# Patient Record
Sex: FEMALE | Race: OTHER | HISPANIC OR LATINO | Employment: FULL TIME | URBAN - METROPOLITAN AREA
[De-identification: names, ages, dates, MRNs, and addresses within clinical notes are randomized per-mention and may not be internally consistent; named-entity substitution may affect disease eponyms.]

---

## 2017-02-22 ENCOUNTER — OFFICE VISIT (OUTPATIENT)
Dept: URGENT CARE | Facility: PHYSICIAN GROUP | Age: 42
End: 2017-02-22
Payer: COMMERCIAL

## 2017-02-22 VITALS
DIASTOLIC BLOOD PRESSURE: 88 MMHG | OXYGEN SATURATION: 97 % | HEART RATE: 100 BPM | BODY MASS INDEX: 28.14 KG/M2 | WEIGHT: 158.8 LBS | HEIGHT: 63 IN | TEMPERATURE: 98.1 F | SYSTOLIC BLOOD PRESSURE: 134 MMHG

## 2017-02-22 DIAGNOSIS — J06.9 VIRAL URI: ICD-10-CM

## 2017-02-22 LAB
INT CON NEG: NEGATIVE
INT CON POS: POSITIVE
S PYO AG THROAT QL: NORMAL

## 2017-02-22 PROCEDURE — 87880 STREP A ASSAY W/OPTIC: CPT | Performed by: FAMILY MEDICINE

## 2017-02-22 PROCEDURE — 99214 OFFICE O/P EST MOD 30 MIN: CPT | Performed by: FAMILY MEDICINE

## 2017-02-22 RX ORDER — BENZONATATE 100 MG/1
100 CAPSULE ORAL 3 TIMES DAILY PRN
Qty: 60 CAP | Refills: 0 | Status: SHIPPED | OUTPATIENT
Start: 2017-02-22 | End: 2019-01-25

## 2017-02-22 NOTE — MR AVS SNAPSHOT
"        Sybil Manuel   2017 6:00 PM   Office Visit   MRN: [id number]    Department:  [facility]   Dept Phone:  [phone]    Description:  Female : 1975   Provider:  Lex Magana M.D.           Reason for Visit     Sore Throat sore throat x4 days, cough      Allergies as of 2017     No Known Allergies      You were diagnosed with     Viral URI   [[id number]]         Vital Signs     Blood Pressure Pulse Temperature Height Weight Body Mass Index    134/88 mmHg 100 36.7 °C (98.1 °F) 1.6 m (5' 2.99\") 72.031 kg (158 lb 12.8 oz) 28.14 kg/m2    Oxygen Saturation Smoking Status                97% Never Smoker           Basic Information     Date Of Birth Sex Race Ethnicity Preferred Language    1975 Female  or  Unknown English      Problem List              ICD-10-CM Priority Class Noted - Resolved    Hyperglycemia R73.9   2010 - Present    Elevated SGOT R74.0   9/3/2010 - Present    Vitamin d deficiency    9/3/2010 - Present    Acne L70.9   2011 - Present    Seasonal allergies J30.2   7/15/2016 - Present      Health Maintenance        Date Due Completion Dates    IMM DTaP/Tdap/Td Vaccine (1 - Tdap) 1993 ---    IMM INFLUENZA (1) 2016 ---    MAMMOGRAM 2017, 7/10/2015, 2015    PAP SMEAR 2019            Current Immunizations     No immunizations on file.      Below and/or attached are the medications your provider expects you to take. Review all of your home medications and newly ordered medications with your provider and/or pharmacist. Follow medication instructions as directed by your provider and/or pharmacist. Please keep your medication list with you and share with your provider. Update the information when medications are discontinued, doses are changed, or new medications (including over-the-counter products) are added; and carry medication information at all times in the event of emergency situations     Allergies:  " No Known Allergies          Medications  Valid as of: February 22, 2017 -  7:13 PM    Generic Name Brand Name Tablet Size Instructions for use    Acyclovir (Tab) ZOVIRAX 800 MG Take 800 mg by mouth 2 times a day. For mouth lesions.   Indications: Herpes Simplex Infection        Benzonatate (Cap) TESSALON 100 MG Take 1 Cap by mouth 3 times a day as needed for Cough.        Lancets (Misc) Lancets  1 Units by Does not apply route 4 times a day.        Loratadine   Take 10 mg by mouth 1 time daily as needed.        Mometasone Furoate (Suspension) NASONEX 50 MCG/ACT Spray 2 Act in nose every day. Each nostril        Multiple Vitamins-Minerals   Take  by mouth.        Mupirocin (Ointment) BACTROBAN 2 % Apply  to affected area(s) 3 times a day as needed. Apply thin film        .                 Medicines prescribed today were sent to:     [facility] [address] [place] [id number]    Phone: [phone] Fax: [phone]    Open 24 Hours?: No      Medication refill instructions:       If your prescription bottle indicates you have medication refills left, it is not necessary to call your provider’s office. Please contact your pharmacy and they will refill your medication.    If your prescription bottle indicates you do not have any refills left, you may request refills at any time through one of the following ways: The online [employer] system (except Urgent Care), by calling your provider’s office, or by asking your pharmacy to contact your provider’s office with a refill request. Medication refills are processed only during regular business hours and may not be available until the next business day. Your provider may request additional information or to have a follow-up visit with you prior to refilling your medication.   *Please Note: Medication refills are assigned a new Rx number when refilled electronically. Your pharmacy may indicate that no refills were  authorized even though a new prescription for the same medication is available at the pharmacy. Please request the medicine by name with the pharmacy before contacting your provider for a refill.           BOOK A TIGER Access Code: [email or web address]  Expires: 3/24/2017  5:48 PM    BOOK A TIGER  A secure, online tool to manage your health information     [employer]’s BOOK A TIGER® is a secure, online tool that connects you to your personalized health information from the privacy of your home -- day or night - making it very easy for you to manage your healthcare. Once the activation process is completed, you can even access your medical information using the BOOK A TIGER windy, which is available for free in the Apple Windy store or Google Play store.     BOOK A TIGER provides the following levels of access (as shown below):   My Chart Features   [facility] Doctor [facility]  Urgent  Care Non-[facility]  Primary Care  Doctor   Email your healthcare team securely and privately 24/7 X X X    Manage appointments: schedule your next appointment; view details of past/upcoming appointments X      Request prescription refills. X      View recent personal medical records, including lab and immunizations X X X X   View health record, including health history, allergies, medications X X X X   Read reports about your outpatient visits, procedures, consult and ER notes X X X X   See your discharge summary, which is a recap of your hospital and/or ER visit that includes your diagnosis, lab results, and care plan. X X       How to register for BOOK A TIGER:  1. Go to  [email or web address].  2. Click on the Sign Up Now box, which takes you to the New Member Sign Up page. You will need to provide the following information:  a. Enter your BOOK A TIGER Access Code exactly as it appears at the top of this page. (You will not need to use this code after you’ve completed the sign-up process. If you do not sign up before the expiration date, you  must request a new code.)   b. Enter your date of birth.   c. Enter your home email address.   d. Click Submit, and follow the next screen’s instructions.  3. Create a [employer] ID. This will be your [employer] login ID and cannot be changed, so think of one that is secure and easy to remember.  4. Create a [employer] password. You can change your password at any time.  5. Enter your Password Reset Question and Answer. This can be used at a later time if you forget your password.   6. Enter your e-mail address. This allows you to receive e-mail notifications when new information is available in [employer].  7. Click Sign Up. You can now view your health information.    For assistance activating your [employer] account, call [phone]

## 2017-02-23 NOTE — PROGRESS NOTES
"Subjective:     CC:  presents with Pharyngitis            Pharyngitis   This is a new problem. The current episode started in the past 2 days. The problem has been unchanged. There has been no fever. The pain is moderate. Associated symptoms include a hoarse voice, cough. Pertinent negatives include no abdominal pain,  , diarrhea, headaches, shortness of breath or vomiting. no exposure to strep or mono.   has tried acetaminophen for the symptoms. The treatment provided mild relief.     Social History   Substance Use Topics   • Smoking status: Never Smoker    • Smokeless tobacco: Never Used   • Alcohol Use: 0.0 oz/week     0 Standard drinks or equivalent per week      Comment: 4 per year       Past Medical History   Diagnosis Date   • Depression 2008     no antidepressant   • Anxiety      Lorazepam rarely   • Allergy    • GERD (gastroesophageal reflux disease)      occasional   • Hyperlipidemia    • Vitamin D deficiency 2009   • Elevated liver enzymes 2009   • Urinary tract infection, site not specified    • Diabetes mellitus type II 9/3/2010   • ASTHMA 9/3/2010       Review of Systems   Constitutional: Positive for malaise/fatigue. Negative for fever and weight loss.   HENT: Positive for hoarse voice and trouble swallowing. Negative for congestion.    Respiratory: Negative for cough, sputum production and shortness of breath.    Cardiovascular: Negative for chest pain.   Gastrointestinal: Negative for nausea, vomiting, abdominal pain and diarrhea.   Genitourinary: Negative.    Neurological: Negative for dizziness and headaches.   All other systems reviewed and are negative.         Objective:   Blood pressure 134/88, pulse 100, temperature 36.7 °C (98.1 °F), height 1.6 m (5' 2.99\"), weight 72.031 kg (158 lb 12.8 oz), SpO2 97 %.        Physical Exam   Constitutional:   oriented to person, place, and time.  appears well-developed and well-nourished. No distress.   HENT:   Head: Normocephalic and atraumatic.   Right " Ear: External ear normal.   Left Ear: External ear normal.   Nose: Mucosal edema present. Right sinus exhibits no maxillary sinus tenderness and no frontal sinus tenderness. Left sinus exhibits no maxillary sinus tenderness and no frontal sinus tenderness.   Mouth/Throat: no posterior oropharyngeal exudate.   There is posterior oropharyngeal erythema present. No posterior oropharyngeal edema.   Tonsils 2+ bilaterally     Eyes: Conjunctivae and EOM are normal. Pupils are equal, round, and reactive to light. Right eye exhibits no discharge. Left eye exhibits no discharge. No scleral icterus.   Neck: Normal range of motion. Neck supple. No JVD present. No tracheal deviation present. No thyromegaly present.   Cardiovascular: Normal rate, regular rhythm, normal heart sounds and intact distal pulses.  Exam reveals no friction rub.    No murmur heard.  Pulmonary/Chest: Effort normal and breath sounds normal. No respiratory distress.   no wheezes.   no rales.    Musculoskeletal:  exhibits no edema.   Lymphadenopathy:     NO Cervical adenopathy.   Neurological:   alert and oriented to person, place, and time.   Skin: Skin is warm and dry. No erythema.   Psychiatric:   normal mood and affect.   Nursing note and vitals reviewed.             Assessment/Plan:     1. Viral URI  Rapid strep negative  - benzonatate (TESSALON) 100 MG Cap; Take 1 Cap by mouth 3 times a day as needed for Cough.  Dispense: 60 Cap; Refill: 0  - POCT Rapid Strep A

## 2017-02-24 ENCOUNTER — TELEPHONE (OUTPATIENT)
Dept: MEDICAL GROUP | Facility: MEDICAL CENTER | Age: 42
End: 2017-02-24

## 2017-02-24 ENCOUNTER — OFFICE VISIT (OUTPATIENT)
Dept: MEDICAL GROUP | Facility: CLINIC | Age: 42
End: 2017-02-24
Payer: COMMERCIAL

## 2017-02-24 VITALS
SYSTOLIC BLOOD PRESSURE: 128 MMHG | DIASTOLIC BLOOD PRESSURE: 76 MMHG | HEIGHT: 63 IN | RESPIRATION RATE: 20 BRPM | WEIGHT: 157.1 LBS | HEART RATE: 96 BPM | TEMPERATURE: 97.9 F | OXYGEN SATURATION: 96 % | BODY MASS INDEX: 27.84 KG/M2

## 2017-02-24 DIAGNOSIS — J40 BRONCHITIS: ICD-10-CM

## 2017-02-24 PROCEDURE — 99213 OFFICE O/P EST LOW 20 MIN: CPT | Performed by: NURSE PRACTITIONER

## 2017-02-24 RX ORDER — AZITHROMYCIN 250 MG/1
TABLET, FILM COATED ORAL
Qty: 6 TAB | Refills: 0 | Status: SHIPPED | OUTPATIENT
Start: 2017-02-24 | End: 2019-01-25

## 2017-02-24 ASSESSMENT — ENCOUNTER SYMPTOMS
SORE THROAT: 1
MYALGIAS: 1
CHILLS: 1
HEADACHES: 0
ABDOMINAL PAIN: 0
SHORTNESS OF BREATH: 1
SPUTUM PRODUCTION: 1
COUGH: 1
NAUSEA: 0
FEVER: 1
VOMITING: 0
WHEEZING: 0

## 2017-02-24 NOTE — TELEPHONE ENCOUNTER
PT called states she went to urgent care wed, states they didn't give her any antibiotics   Pt states she is having chest and sinus congestion, pain in her chest when coughing, productive green mucus cough, pt states she feels like she is going to vomit when she coughs would like to know if there is anything you could give her to help?

## 2017-02-24 NOTE — TELEPHONE ENCOUNTER
Please inform patient:  Urgent care note reviewed, from the documentation it sounds like this is about 4 days of illness. This means that it is most likely viral and an antibiotic will not help. She should take Mucinex, drink lots of water, rest. I can give her prescription cough medicine if needed- let me know if she would like this. Otherwise this should resolve in 7-10 days

## 2017-02-24 NOTE — TELEPHONE ENCOUNTER
Pt states she usually gets a z-pack when she gets like this and gets better would like to know if she could get that rx

## 2017-02-24 NOTE — TELEPHONE ENCOUNTER
Not necessary at this time. I am happy to give her cough syrup if she would like. If she is not improving by next week we can get her in for a visit to determine if she does need an antibiotic

## 2017-02-24 NOTE — TELEPHONE ENCOUNTER
Patient can  Prescription for cough medicine from the . She should not drive or drink alcohol when using this medication

## 2017-02-25 NOTE — PROGRESS NOTES
Chief Complaint   Patient presents with   • URI     coughing/chest congestion/ bodyaches/chills X 6 days        HISTORY OF PRESENT ILLNESS: Patient is a 41 y.o. female established patient who presents today due to 6 days hx of productive coughing, nasal and chest congestion, body aches, right ear plug, sore throat. Pt reported she went to urgent care two days ago, prescribed with tessalon but it did not help at all. She called her doctor and was prescribed with coughing syrup with codeine but she did not get the prescription. She is now taking OTC joellen-seltzer for her symptoms but has not seen the difference yet.      Patient Active Problem List    Diagnosis Date Noted   • Seasonal allergies 07/15/2016   • Acne 07/21/2011   • Elevated SGOT 09/03/2010   • Vitamin d deficiency 09/03/2010   • Hyperglycemia 07/22/2010       Allergies:Review of patient's allergies indicates no known allergies.    Current Outpatient Prescriptions   Medication Sig Dispense Refill   • benzonatate (TESSALON) 100 MG Cap Take 1 Cap by mouth 3 times a day as needed for Cough. 60 Cap 0   • Lancets MISC 1 Units by Does not apply route 4 times a day. 120 Each 6   • Guaifenesin-Codeine 200-20 MG/5ML Liquid Take 5 mL by mouth every 6 hours as needed. 100 mL 0   • acyclovir (ZOVIRAX) 800 MG TABS Take 800 mg by mouth 2 times a day. For mouth lesions.   Indications: Herpes Simplex Infection     • mometasone (NASONEX) 50 MCG/ACT nasal spray Spray 2 Act in nose every day. Each nostril 1 Act 11   • mupirocin (BACTROBAN) 2 % OINT Apply  to affected area(s) 3 times a day as needed. Apply thin film 1 Tube 0   • LORATADINE Take 10 mg by mouth 1 time daily as needed.     • Multiple Vitamins-Minerals (MULTIVITAL PO) Take  by mouth.       No current facility-administered medications for this visit.       Social History   Substance Use Topics   • Smoking status: Never Smoker    • Smokeless tobacco: Never Used   • Alcohol Use: 0.0 oz/week     0 Standard drinks or  "equivalent per week      Comment: 4 per year       Family Status   Relation Status Death Age   • Mother Alive    • Maternal Grandmother     • Father Alive    • Daughter Alive    • Daughter Alive    • Son Alive    • Sister Alive    • Brother Alive      Family History   Problem Relation Age of Onset   • Heart Disease Mother      67 yo stents, pacemaker x2   • Hypertension Mother    • Hyperlipidemia Mother    • Arrythmia Mother    • Diabetes Maternal Grandmother    • Hyperlipidemia Brother          ROS:  Review of Systems   Constitutional: Positive for fever ( she felt chills and then sweating last night. no temp checked but might have fever at that time), chills and malaise/fatigue.   HENT: Positive for congestion, ear pain ( right ear) and sore throat.    Respiratory: Positive for cough, sputum production and shortness of breath. Negative for wheezing.    Gastrointestinal: Negative for nausea, vomiting and abdominal pain.   Musculoskeletal: Positive for myalgias.   Neurological: Negative for headaches.        Objective:     Blood pressure 128/76, pulse 96, temperature 36.6 °C (97.9 °F), resp. rate 20, height 1.598 m (5' 2.9\"), weight 71.26 kg (157 lb 1.6 oz), SpO2 96 %.     Physical Exam:  Physical Exam   Constitutional: She is oriented to person, place, and time and well-developed, well-nourished, and in no distress.   HENT:   Head: Normocephalic and atraumatic.   Right Ear: There is tenderness. Tympanic membrane is injected ( very mild small area).   Left Ear: External ear normal.   Nose: Right sinus exhibits no maxillary sinus tenderness and no frontal sinus tenderness. Left sinus exhibits no maxillary sinus tenderness and no frontal sinus tenderness.   Mouth/Throat: Oropharynx is clear and moist.   Eyes: Conjunctivae are normal.   Cardiovascular: Normal rate.    Pulmonary/Chest: Effort normal. No respiratory distress. She has no wheezes. She has no rales.   Diminished at lower lobes   Abdominal: Soft. "   Musculoskeletal: Normal range of motion. She exhibits no edema.   Neurological: She is alert and oriented to person, place, and time.   Skin: Skin is warm.   Vitals reviewed.        Assessment/Plan:  1. Bronchitis  - Guaifenesin-Codeine 200-20 MG/5ML Liquid; Take 5 mL by mouth at bedtime as needed.  Dispense: 100 mL; Refill: 0  - azithromycin (ZITHROMAX) 250 MG Tab; As directed  Dispense: 6 Tab; Refill: 0 to also cover possible otitis media  - Call if worsening symptoms. Pt verbalized understanding.

## 2017-03-01 ENCOUNTER — HOSPITAL ENCOUNTER (OUTPATIENT)
Facility: MEDICAL CENTER | Age: 42
End: 2017-03-01
Attending: PHYSICIAN ASSISTANT
Payer: COMMERCIAL

## 2017-03-01 PROCEDURE — 86480 TB TEST CELL IMMUN MEASURE: CPT

## 2017-03-03 LAB
M TB TUBERC IFN-G/MITOGEN IGNF BLD: 0.21
M TB TUBERC IGNF/MITOGEN IGNF CONTROL: 83.91 [IU]/ML
MITOGEN IGNF BCKGRD COR BLD-ACNC: 0.12 [IU]/ML
QUANT TB GOLD 86480: NEGATIVE

## 2018-04-26 ENCOUNTER — HOSPITAL ENCOUNTER (OUTPATIENT)
Dept: LAB | Facility: MEDICAL CENTER | Age: 43
End: 2018-04-26
Attending: CLINICAL NURSE SPECIALIST
Payer: COMMERCIAL

## 2018-04-26 PROCEDURE — 87077 CULTURE AEROBIC IDENTIFY: CPT | Mod: 91

## 2018-04-26 PROCEDURE — 87205 SMEAR GRAM STAIN: CPT

## 2018-04-26 PROCEDURE — 87070 CULTURE OTHR SPECIMN AEROBIC: CPT

## 2018-04-26 PROCEDURE — 87086 URINE CULTURE/COLONY COUNT: CPT

## 2018-04-27 ENCOUNTER — HOSPITAL ENCOUNTER (OUTPATIENT)
Dept: LAB | Facility: MEDICAL CENTER | Age: 43
End: 2018-04-27
Attending: CLINICAL NURSE SPECIALIST
Payer: COMMERCIAL

## 2018-04-27 LAB
25(OH)D3 SERPL-MCNC: 21 NG/ML (ref 30–100)
ALBUMIN SERPL BCP-MCNC: 4.2 G/DL (ref 3.2–4.9)
ALBUMIN/GLOB SERPL: 1.1 G/DL
ALP SERPL-CCNC: 85 U/L (ref 30–99)
ALT SERPL-CCNC: 76 U/L (ref 2–50)
AMBIGUOUS DTTM AMBI4: NORMAL
ANION GAP SERPL CALC-SCNC: 9 MMOL/L (ref 0–11.9)
AST SERPL-CCNC: 52 U/L (ref 12–45)
BILIRUB SERPL-MCNC: 0.5 MG/DL (ref 0.1–1.5)
BUN SERPL-MCNC: 12 MG/DL (ref 8–22)
CALCIUM SERPL-MCNC: 9.5 MG/DL (ref 8.5–10.5)
CHLORIDE SERPL-SCNC: 100 MMOL/L (ref 96–112)
CHOLEST SERPL-MCNC: 206 MG/DL (ref 100–199)
CO2 SERPL-SCNC: 26 MMOL/L (ref 20–33)
CREAT SERPL-MCNC: 0.65 MG/DL (ref 0.5–1.4)
ERYTHROCYTE [DISTWIDTH] IN BLOOD BY AUTOMATED COUNT: 42.2 FL (ref 35.9–50)
EST. AVERAGE GLUCOSE BLD GHB EST-MCNC: 266 MG/DL
GLOBULIN SER CALC-MCNC: 3.8 G/DL (ref 1.9–3.5)
GLUCOSE SERPL-MCNC: 259 MG/DL (ref 65–99)
GRAM STN SPEC: NORMAL
HBA1C MFR BLD: 10.9 % (ref 0–5.6)
HCT VFR BLD AUTO: 44.9 % (ref 37–47)
HDLC SERPL-MCNC: 36 MG/DL
HGB BLD-MCNC: 14.3 G/DL (ref 12–16)
LDLC SERPL CALC-MCNC: 135 MG/DL
MCH RBC QN AUTO: 27.9 PG (ref 27–33)
MCHC RBC AUTO-ENTMCNC: 31.8 G/DL (ref 33.6–35)
MCV RBC AUTO: 87.7 FL (ref 81.4–97.8)
PLATELET # BLD AUTO: 329 K/UL (ref 164–446)
PMV BLD AUTO: 10.8 FL (ref 9–12.9)
POTASSIUM SERPL-SCNC: 4.5 MMOL/L (ref 3.6–5.5)
PROT SERPL-MCNC: 8 G/DL (ref 6–8.2)
RBC # BLD AUTO: 5.12 M/UL (ref 4.2–5.4)
SIGNIFICANT IND 70042: NORMAL
SIGNIFICANT IND 70042: NORMAL
SITE SITE: NORMAL
SITE SITE: NORMAL
SODIUM SERPL-SCNC: 135 MMOL/L (ref 135–145)
SOURCE SOURCE: NORMAL
SOURCE SOURCE: NORMAL
T4 FREE SERPL-MCNC: 0.88 NG/DL (ref 0.53–1.43)
TRIGL SERPL-MCNC: 174 MG/DL (ref 0–149)
TSH SERPL DL<=0.005 MIU/L-ACNC: 2.19 UIU/ML (ref 0.38–5.33)
WBC # BLD AUTO: 6.1 K/UL (ref 4.8–10.8)

## 2018-04-27 PROCEDURE — 80061 LIPID PANEL: CPT

## 2018-04-27 PROCEDURE — 83036 HEMOGLOBIN GLYCOSYLATED A1C: CPT

## 2018-04-27 PROCEDURE — 84439 ASSAY OF FREE THYROXINE: CPT

## 2018-04-27 PROCEDURE — 80053 COMPREHEN METABOLIC PANEL: CPT

## 2018-04-27 PROCEDURE — 84443 ASSAY THYROID STIM HORMONE: CPT

## 2018-04-27 PROCEDURE — 85027 COMPLETE CBC AUTOMATED: CPT

## 2018-04-27 PROCEDURE — 36415 COLL VENOUS BLD VENIPUNCTURE: CPT

## 2018-04-27 PROCEDURE — 82306 VITAMIN D 25 HYDROXY: CPT

## 2018-04-29 LAB
BACTERIA UR CULT: ABNORMAL
BACTERIA UR CULT: ABNORMAL
SIGNIFICANT IND 70042: ABNORMAL
SITE SITE: ABNORMAL
SOURCE SOURCE: ABNORMAL

## 2018-04-30 LAB
BACTERIA GENITAL AEROBE CULT: ABNORMAL
BACTERIA GENITAL AEROBE CULT: ABNORMAL
GRAM STN SPEC: ABNORMAL
SIGNIFICANT IND 70042: ABNORMAL
SITE SITE: ABNORMAL
SOURCE SOURCE: ABNORMAL

## 2018-05-11 ENCOUNTER — TELEPHONE (OUTPATIENT)
Dept: MEDICAL GROUP | Facility: MEDICAL CENTER | Age: 43
End: 2018-05-11

## 2018-05-11 NOTE — TELEPHONE ENCOUNTER
Patient called needing to reschedule today's appointment at 2:40 pm. Called patient and left voicemail to call back office

## 2018-07-13 ENCOUNTER — OFFICE VISIT (OUTPATIENT)
Dept: MEDICAL GROUP | Facility: MEDICAL CENTER | Age: 43
End: 2018-07-13
Payer: COMMERCIAL

## 2018-07-13 VITALS
RESPIRATION RATE: 16 BRPM | TEMPERATURE: 97.7 F | WEIGHT: 152 LBS | OXYGEN SATURATION: 97 % | HEART RATE: 104 BPM | DIASTOLIC BLOOD PRESSURE: 70 MMHG | HEIGHT: 63 IN | BODY MASS INDEX: 26.93 KG/M2 | SYSTOLIC BLOOD PRESSURE: 110 MMHG

## 2018-07-13 DIAGNOSIS — E11.9 NEWLY DIAGNOSED DIABETES (HCC): ICD-10-CM

## 2018-07-13 DIAGNOSIS — R74.8 ELEVATED LIVER ENZYMES: ICD-10-CM

## 2018-07-13 DIAGNOSIS — E55.9 VITAMIN D DEFICIENCY: ICD-10-CM

## 2018-07-13 DIAGNOSIS — E11.69 DYSLIPIDEMIA ASSOCIATED WITH TYPE 2 DIABETES MELLITUS (HCC): ICD-10-CM

## 2018-07-13 DIAGNOSIS — E78.5 DYSLIPIDEMIA ASSOCIATED WITH TYPE 2 DIABETES MELLITUS (HCC): ICD-10-CM

## 2018-07-13 PROCEDURE — 99214 OFFICE O/P EST MOD 30 MIN: CPT | Performed by: NURSE PRACTITIONER

## 2018-07-13 RX ORDER — METFORMIN HYDROCHLORIDE 500 MG/1
TABLET, EXTENDED RELEASE ORAL
Qty: 120 TAB | Refills: 1 | Status: SHIPPED | OUTPATIENT
Start: 2018-07-13 | End: 2018-12-21 | Stop reason: SDUPTHER

## 2018-07-13 ASSESSMENT — PATIENT HEALTH QUESTIONNAIRE - PHQ9: CLINICAL INTERPRETATION OF PHQ2 SCORE: 0

## 2018-07-13 NOTE — PROGRESS NOTES
Subjective:     Chief Complaint   Patient presents with   • Follow-Up     RESULTS FROM OB     Sybil Manuel is a 43 y.o. female here today to review labs recently you ordered by her OB/GYN. We discussed:    Newly diagnosed diabetes (HCC)  Labs recently ordered by her OB/GYN showing a1c 10.9  Brothers, grandmother uncles with diabetes  Feels tired but denies polyuria, polyphagia. No numbness or tingling in ext  Eating smaller portions and healthier options in the last few weeks since receiving lab results  Working out, hiking  No prior diabetes education  Recent kidney function normal    Vitamin D deficiency  21 in recent labs, not currently on supplement    Elevated liver enzymes  Component      Latest Ref Rng & Units 4/27/2018           7:37 AM   AST(SGOT)      12 - 45 U/L 52 (H)   ALT(SGPT)      2 - 50 U/L 76 (H)   Rare ETOH use      Dyslipidemia associated with type 2 diabetes mellitus (HCC)  Component      Latest Ref Rng & Units 4/27/2018           7:37 AM   Cholesterol,Tot      100 - 199 mg/dL 206 (H)   Triglycerides      0 - 149 mg/dL 174 (H)   HDL      >=40 mg/dL 36 (A)   LDL      <100 mg/dL 135 (H)        Current medicines (including changes today)  Current Outpatient Prescriptions   Medication Sig Dispense Refill   • metFORMIN ER (GLUCOPHAGE XR) 500 MG TABLET SR 24 HR Start 1 tab PO QAM with food. Gradually increase to goal of 2 tabs PO  Tab 1   • LORATADINE Take 10 mg by mouth 1 time daily as needed.     • Multiple Vitamins-Minerals (MULTIVITAL PO) Take  by mouth.     • Guaifenesin-Codeine 200-20 MG/5ML Liquid Take 5 mL by mouth at bedtime as needed. 100 mL 0   • azithromycin (ZITHROMAX) 250 MG Tab As directed 6 Tab 0   • benzonatate (TESSALON) 100 MG Cap Take 1 Cap by mouth 3 times a day as needed for Cough. 60 Cap 0   • mometasone (NASONEX) 50 MCG/ACT nasal spray Spray 2 Act in nose every day. Each nostril 1 Act 11   • Lancets MISC 1 Units by Does not apply route 4 times a day. 120 Each 6   •  "mupirocin (BACTROBAN) 2 % OINT Apply  to affected area(s) 3 times a day as needed. Apply thin film 1 Tube 0     No current facility-administered medications for this visit.      She  has a past medical history of Allergy; Anxiety; ASTHMA (9/3/2010); Depression (2008); Diabetes mellitus type II (9/3/2010); Elevated liver enzymes (2009); GERD (gastroesophageal reflux disease); Hyperlipidemia; Urinary tract infection, site not specified; and Vitamin D deficiency (2009). She also has no past medical history of Addisons disease (HCC); Adrenal disorder (HCC); Anemia; Arrhythmia; Arthritis; Blood transfusion; CATARACT; CHF (congestive heart failure) (HCC); Clotting disorder (HCC); COPD; Cushings syndrome (HCC); EMPHYSEMA; Glaucoma; Goiter; Headache(784.0); Heart attack (HCC); Heart murmur; Hypertension; IBD (inflammatory bowel disease); Kidney disease; Meningitis; Migraine; Muscle disorder; OSTEOPOROSIS; Parathyroid disorder (HCC); Pituitary disease (HCC); Seizure (HCC); Stroke (HCC); Substance abuse; Thyroid disease; Tuberculosis; or Ulcer.    ROS included above     Objective:     Blood pressure 110/70, pulse (!) 104, temperature 36.5 °C (97.7 °F), resp. rate 16, height 1.598 m (5' 2.91\"), weight 68.9 kg (152 lb), SpO2 97 %. Body mass index is 27 kg/m².     Physical Exam:  General: Alert, oriented in no acute distress.  Eye contact is good, speech is normal, affect calm  HEENT: Oral mucosa pink moist, no lesions. TMs gray with good landmarks bilaterally. No lymphadenopathy.  Lungs: clear to auscultation bilaterally, normal effort, no wheeze/ rhonchi/ rales.  CV: regular rate and rhythm, S1, S2, no murmur  Abdomen: soft, nontender, no hepatosplenomegaly  Ext: no edema, color normal, vascularity normal, temperature normal    Assessment and Plan:   The following treatment plan was discussed  1. Newly diagnosed diabetes (HCC)  New diagnosis of diabetes, A1c 10.9. Diet exercise recommendations reviewed, referred for diabetic " education. Start metformin with gradual increase to goal of 2000 mg daily. Refer for eye exam. Follow up in October with labs prior  REFERRAL TO DIABETIC EDUCATION Diabetes Self Management Education / Training (DSME/T) and Medical Nutrition Therapy (MNT): Initial Group DSME/MNT as authorized by payor, Follow-Up DSME/MNT as authorized by payor; DSME/T Content: Monitoring Diabete...    metFORMIN ER (GLUCOPHAGE XR) 500 MG TABLET SR 24 HR    REFERRAL TO OPHTHALMOLOGY    COMP METABOLIC PANEL    HEMOGLOBIN A1C   2. Vitamin D deficiency  Start 2000 international units vitamin D3 daily  VITAMIN D 25-HYDROXY   3. Dyslipidemia associated with type 2 diabetes mellitus (HCC)  May improve with improvement of glycemic control. Recheck in October   LIPID PROFILE   4. Elevated liver enzymes  Continue to monitor        Followup: 3 months with labs prior         Please note that this dictation was created using voice recognition software. I have worked with consultants from the vendor as well as technical experts from [facility] to optimize the interface. I have made every reasonable attempt to correct obvious errors, but I expect that there are errors of grammar and possibly content that I did not discover before finalizing the note.

## 2018-07-13 NOTE — ASSESSMENT & PLAN NOTE
Component      Latest Ref Rng & Units 4/27/2018           7:37 AM   AST(SGOT)      12 - 45 U/L 52 (H)   ALT(SGPT)      2 - 50 U/L 76 (H)   Rare ETOH use

## 2018-07-13 NOTE — ASSESSMENT & PLAN NOTE
Labs recently ordered by her OB/GYN showing a1c 10.9  Brothers, grandmother uncles with diabetes  Feels tired but denies polyuria, polyphagia. No numbness or tingling in ext  Eating smaller portions and healthier options in the last few weeks since receiving lab results  Working out, hiking  No prior diabetes education  Recent kidney function normal

## 2018-07-13 NOTE — ASSESSMENT & PLAN NOTE
Component      Latest Ref Rng & Units 4/27/2018           7:37 AM   Cholesterol,Tot      100 - 199 mg/dL 206 (H)   Triglycerides      0 - 149 mg/dL 174 (H)   HDL      >=40 mg/dL 36 (A)   LDL      <100 mg/dL 135 (H)

## 2018-12-21 DIAGNOSIS — E11.9 NEWLY DIAGNOSED DIABETES (HCC): ICD-10-CM

## 2018-12-24 RX ORDER — METFORMIN HYDROCHLORIDE 500 MG/1
1000 TABLET, EXTENDED RELEASE ORAL 2 TIMES DAILY
Qty: 360 TAB | Refills: 0 | Status: SHIPPED | OUTPATIENT
Start: 2018-12-24 | End: 2019-02-08 | Stop reason: SDUPTHER

## 2019-01-10 NOTE — TELEPHONE ENCOUNTER
Patient has appointment scheduled to see DR. NAHUN HERNÁNDEZ for patient to call back to get her rescheduled to see Minal thurston.

## 2019-01-11 ENCOUNTER — HOSPITAL ENCOUNTER (OUTPATIENT)
Dept: RADIOLOGY | Facility: MEDICAL CENTER | Age: 44
End: 2019-01-11
Attending: NURSE PRACTITIONER
Payer: COMMERCIAL

## 2019-01-11 DIAGNOSIS — Z12.31 VISIT FOR SCREENING MAMMOGRAM: ICD-10-CM

## 2019-01-11 PROCEDURE — 77063 BREAST TOMOSYNTHESIS BI: CPT

## 2019-01-25 ENCOUNTER — OFFICE VISIT (OUTPATIENT)
Dept: MEDICAL GROUP | Facility: MEDICAL CENTER | Age: 44
End: 2019-01-25
Payer: COMMERCIAL

## 2019-01-25 VITALS
WEIGHT: 153 LBS | SYSTOLIC BLOOD PRESSURE: 120 MMHG | TEMPERATURE: 97.7 F | BODY MASS INDEX: 27.11 KG/M2 | DIASTOLIC BLOOD PRESSURE: 88 MMHG | HEIGHT: 63 IN | OXYGEN SATURATION: 98 % | RESPIRATION RATE: 16 BRPM | HEART RATE: 104 BPM

## 2019-01-25 DIAGNOSIS — Z23 NEED FOR INFLUENZA VACCINATION: ICD-10-CM

## 2019-01-25 DIAGNOSIS — E11.69 DYSLIPIDEMIA ASSOCIATED WITH TYPE 2 DIABETES MELLITUS (HCC): ICD-10-CM

## 2019-01-25 DIAGNOSIS — E78.5 DYSLIPIDEMIA ASSOCIATED WITH TYPE 2 DIABETES MELLITUS (HCC): ICD-10-CM

## 2019-01-25 DIAGNOSIS — Z13.21 ENCOUNTER FOR VITAMIN DEFICIENCY SCREENING: ICD-10-CM

## 2019-01-25 DIAGNOSIS — R74.8 ELEVATED LIVER ENZYMES: ICD-10-CM

## 2019-01-25 PROCEDURE — 99214 OFFICE O/P EST MOD 30 MIN: CPT | Mod: 25 | Performed by: NURSE PRACTITIONER

## 2019-01-25 PROCEDURE — 90715 TDAP VACCINE 7 YRS/> IM: CPT | Performed by: NURSE PRACTITIONER

## 2019-01-25 PROCEDURE — 90471 IMMUNIZATION ADMIN: CPT | Performed by: NURSE PRACTITIONER

## 2019-01-25 ASSESSMENT — PATIENT HEALTH QUESTIONNAIRE - PHQ9: CLINICAL INTERPRETATION OF PHQ2 SCORE: 0

## 2019-01-25 NOTE — PROGRESS NOTES
Subjective:     Chief Complaint   Patient presents with   • Follow-Up     Sybil Manuel is a 43 y.o. female here today to follow up on:    Diabetes mellitus type 2, uncontrolled, with complications (HCC)  Diagnosed last summer with A1c of 10.9, she is failed to follow-up since that time.  She reports that she is consistently taking metformin 1000 mg twice daily, she has been working on eating healthier.  She has not had time for exercise..  She is checking her glucose occasionally at home with readings typically ranging between 120 and 200  She has had an eye exam in the last year  Checking her feet, no sores  Denies polyuria, polydipsia, numbness or tingling in extremities, side effects related to medication including diarrhea, decreased urine output    Elevated liver enzymes  Last AST 52, ALT 76.  Rarely drinks alcohol.  Denies fatigue, bruising, abdominal pain, nausea    Dyslipidemia associated with type 2 diabetes mellitus (HCC)  Last labs reviewed, due for recheck       Current medicines (including changes today)  Current Outpatient Prescriptions   Medication Sig Dispense Refill   • metFORMIN ER (GLUCOPHAGE XR) 500 MG TABLET SR 24 HR Take 2 Tabs by mouth 2 times a day. 360 Tab 0   • mometasone (NASONEX) 50 MCG/ACT nasal spray Spray 2 Act in nose every day. Each nostril 1 Act 11   • LORATADINE Take 10 mg by mouth 1 time daily as needed.     • Multiple Vitamins-Minerals (MULTIVITAL PO) Take  by mouth.     • Lancets MISC 1 Units by Does not apply route 4 times a day. (Patient not taking: Reported on 1/25/2019) 120 Each 6     No current facility-administered medications for this visit.      She  has a past medical history of Allergy; Anxiety; ASTHMA (9/3/2010); Depression (2008); Diabetes mellitus type II (9/3/2010); Elevated liver enzymes (2009); GERD (gastroesophageal reflux disease); Hyperlipidemia; Urinary tract infection, site not specified; and Vitamin D deficiency (2009). She also has no past medical  "history of Addisons disease (HCC); Adrenal disorder (HCC); Anemia; Arrhythmia; Arthritis; Blood transfusion; CATARACT; CHF (congestive heart failure) (HCC); Clotting disorder (HCC); COPD; Cushings syndrome (HCC); EMPHYSEMA; Glaucoma; Goiter; Headache(784.0); Heart attack (HCC); Heart murmur; Hypertension; IBD (inflammatory bowel disease); Kidney disease; Meningitis; Migraine; Muscle disorder; OSTEOPOROSIS; Parathyroid disorder (HCC); Pituitary disease (HCC); Seizure (HCC); Stroke (HCC); Substance abuse (HCC); Thyroid disease; Tuberculosis; or Ulcer.    ROS included above     Objective:     Blood pressure 120/88, pulse (!) 104, temperature 36.5 °C (97.7 °F), temperature source Temporal, resp. rate 16, height 1.598 m (5' 2.91\"), weight 69.4 kg (153 lb), SpO2 98 %, not currently breastfeeding. Body mass index is 27.18 kg/m².     Physical Exam:  General: Alert, oriented in no acute distress.  Eye contact is good, speech is normal, affect calm  Lungs: clear to auscultation bilaterally, normal effort, no wheeze/ rhonchi/ rales.  CV: regular rate and rhythm, S1, S2, no murmur  Abdomen: soft, nontender, no hepatosplenomegaly  Ext: no edema, color normal, vascularity normal, temperature normal    Assessment and Plan:   The following treatment plan was discussed   1. Diabetes mellitus type 2, uncontrolled, with complications ([facility])   A1c was 10.9 last summer, she has not followed up since that time and I do suspect that her diabetes remains uncontrolled based on her home glucose readings.  We will obtain labs as listed below, I will contact her with results.  For now she will continue with metformin  HEMOGLOBIN A1C    COMP METABOLIC PANEL    Lipid Profile    MICROALBUMIN CREAT RATIO URINE   2. Dyslipidemia associated with type 2 diabetes mellitus (HCC)   counseled on diet exercise and weight loss recommendations   3. Elevated liver enzymes   recheck labs as listed above   4. Encounter for vitamin deficiency screening  VITAMIN " D,25 HYDROXY   5. Need for influenza vaccination  I have placed the below orders and discussed them with an approved delegating provider. The MA is performing the below orders under the direction of Dr. Jarrett  Tdap =>6yo IM       Followup: pending labs         Please note that this dictation was created using voice recognition software. I have worked with consultants from the vendor as well as technical experts from [facility] to optimize the interface. I have made every reasonable attempt to correct obvious errors, but I expect that there are errors of grammar and possibly content that I did not discover before finalizing the note.

## 2019-01-25 NOTE — ASSESSMENT & PLAN NOTE
Diagnosed last summer with A1c of 10.9, she is failed to follow-up since that time.  She reports that she is consistently taking metformin 1000 mg twice daily, she has been working on eating healthier.  She has not had time for exercise.  She has had an eye exam in the last year  Checking her feet, no sores  Denies polyuria, polydipsia, numbness or tingling in extremities, side effects related to medication including diarrhea, decreased urine output

## 2019-04-18 ENCOUNTER — HOSPITAL ENCOUNTER (OUTPATIENT)
Dept: LAB | Facility: MEDICAL CENTER | Age: 44
End: 2019-04-18
Attending: NURSE PRACTITIONER
Payer: COMMERCIAL

## 2019-04-18 DIAGNOSIS — Z13.21 ENCOUNTER FOR VITAMIN DEFICIENCY SCREENING: ICD-10-CM

## 2019-04-18 LAB
25(OH)D3 SERPL-MCNC: 13 NG/ML (ref 30–100)
ALBUMIN SERPL BCP-MCNC: 4.4 G/DL (ref 3.2–4.9)
ALBUMIN/GLOB SERPL: 1.2 G/DL
ALP SERPL-CCNC: 72 U/L (ref 30–99)
ALT SERPL-CCNC: 89 U/L (ref 2–50)
ANION GAP SERPL CALC-SCNC: 8 MMOL/L (ref 0–11.9)
AST SERPL-CCNC: 50 U/L (ref 12–45)
BILIRUB SERPL-MCNC: 0.3 MG/DL (ref 0.1–1.5)
BUN SERPL-MCNC: 12 MG/DL (ref 8–22)
CALCIUM SERPL-MCNC: 9.4 MG/DL (ref 8.5–10.5)
CHLORIDE SERPL-SCNC: 103 MMOL/L (ref 96–112)
CHOLEST SERPL-MCNC: 205 MG/DL (ref 100–199)
CO2 SERPL-SCNC: 27 MMOL/L (ref 20–33)
CREAT SERPL-MCNC: 0.58 MG/DL (ref 0.5–1.4)
CREAT UR-MCNC: 105.6 MG/DL
EST. AVERAGE GLUCOSE BLD GHB EST-MCNC: 212 MG/DL
GLOBULIN SER CALC-MCNC: 3.7 G/DL (ref 1.9–3.5)
GLUCOSE SERPL-MCNC: 208 MG/DL (ref 65–99)
HBA1C MFR BLD: 9 % (ref 0–5.6)
HDLC SERPL-MCNC: 38 MG/DL
LDLC SERPL CALC-MCNC: 140 MG/DL
MICROALBUMIN UR-MCNC: <0.7 MG/DL
MICROALBUMIN/CREAT UR: NORMAL MG/G (ref 0–30)
POTASSIUM SERPL-SCNC: 4.9 MMOL/L (ref 3.6–5.5)
PROT SERPL-MCNC: 8.1 G/DL (ref 6–8.2)
SODIUM SERPL-SCNC: 138 MMOL/L (ref 135–145)
TRIGL SERPL-MCNC: 136 MG/DL (ref 0–149)

## 2019-04-18 PROCEDURE — 82306 VITAMIN D 25 HYDROXY: CPT

## 2019-04-18 PROCEDURE — 80053 COMPREHEN METABOLIC PANEL: CPT

## 2019-04-18 PROCEDURE — 82043 UR ALBUMIN QUANTITATIVE: CPT

## 2019-04-18 PROCEDURE — 83036 HEMOGLOBIN GLYCOSYLATED A1C: CPT

## 2019-04-18 PROCEDURE — 82570 ASSAY OF URINE CREATININE: CPT

## 2019-04-18 PROCEDURE — 80061 LIPID PANEL: CPT

## 2019-04-18 PROCEDURE — 36415 COLL VENOUS BLD VENIPUNCTURE: CPT

## 2019-04-24 ENCOUNTER — TELEPHONE (OUTPATIENT)
Dept: MEDICAL GROUP | Facility: MEDICAL CENTER | Age: 44
End: 2019-04-24

## 2019-04-24 NOTE — TELEPHONE ENCOUNTER
----- Message from DAVIDA Bro sent at 4/19/2019  2:05 PM PDT -----  Please inform patient diabetes remains uncontrolled with A1c of 9.0, I would like her to schedule a visit to go for labs in detail and discuss treatment

## 2019-07-31 ENCOUNTER — OFFICE VISIT (OUTPATIENT)
Dept: URGENT CARE | Facility: PHYSICIAN GROUP | Age: 44
End: 2019-07-31
Payer: COMMERCIAL

## 2019-07-31 VITALS
DIASTOLIC BLOOD PRESSURE: 76 MMHG | RESPIRATION RATE: 16 BRPM | HEIGHT: 63 IN | BODY MASS INDEX: 26.58 KG/M2 | OXYGEN SATURATION: 96 % | WEIGHT: 150 LBS | HEART RATE: 74 BPM | TEMPERATURE: 97.6 F | SYSTOLIC BLOOD PRESSURE: 120 MMHG

## 2019-07-31 DIAGNOSIS — R30.0 DYSURIA: ICD-10-CM

## 2019-07-31 DIAGNOSIS — N30.01 ACUTE CYSTITIS WITH HEMATURIA: ICD-10-CM

## 2019-07-31 LAB
APPEARANCE UR: NORMAL
BILIRUB UR STRIP-MCNC: NORMAL MG/DL
COLOR UR AUTO: NORMAL
GLUCOSE UR STRIP.AUTO-MCNC: NORMAL MG/DL
INT CON NEG: NEGATIVE
INT CON POS: POSITIVE
KETONES UR STRIP.AUTO-MCNC: NORMAL MG/DL
LEUKOCYTE ESTERASE UR QL STRIP.AUTO: NORMAL
NITRITE UR QL STRIP.AUTO: NORMAL
PH UR STRIP.AUTO: 6.5 [PH] (ref 5–8)
POC URINE PREGNANCY TEST: NORMAL
PROT UR QL STRIP: NORMAL MG/DL
RBC UR QL AUTO: NORMAL
SP GR UR STRIP.AUTO: 1.01
UROBILINOGEN UR STRIP-MCNC: 0.2 MG/DL

## 2019-07-31 PROCEDURE — 81025 URINE PREGNANCY TEST: CPT | Performed by: PHYSICIAN ASSISTANT

## 2019-07-31 PROCEDURE — 99214 OFFICE O/P EST MOD 30 MIN: CPT | Performed by: PHYSICIAN ASSISTANT

## 2019-07-31 PROCEDURE — 81002 URINALYSIS NONAUTO W/O SCOPE: CPT | Performed by: PHYSICIAN ASSISTANT

## 2019-07-31 RX ORDER — PHENAZOPYRIDINE HYDROCHLORIDE 200 MG/1
200 TABLET, FILM COATED ORAL 3 TIMES DAILY PRN
Qty: 6 TAB | Refills: 0 | Status: SHIPPED | OUTPATIENT
Start: 2019-07-31 | End: 2019-08-23

## 2019-07-31 RX ORDER — NITROFURANTOIN 25; 75 MG/1; MG/1
100 CAPSULE ORAL EVERY 12 HOURS
Qty: 10 CAP | Refills: 0 | Status: SHIPPED | OUTPATIENT
Start: 2019-07-31 | End: 2019-08-05

## 2019-07-31 ASSESSMENT — ENCOUNTER SYMPTOMS
VOMITING: 0
FLANK PAIN: 0
FEVER: 0
NAUSEA: 0
CHILLS: 0
ABDOMINAL PAIN: 0
SHORTNESS OF BREATH: 0

## 2019-07-31 NOTE — LETTER
July 31, 2019       Patient: Sybil Manuel   YOB: 1975   Date of Visit: 7/31/2019         To Whom It May Concern:    It is my medical opinion that Sybil Manuel should be excused from work for today due to illness.      If you have any questions or concerns, please don't hesitate to call [phone]          Sincerely,          Arvin Harkins P.A.-C.  Electronically Signed

## 2019-07-31 NOTE — PROGRESS NOTES
"Subjective:   Sybil Manuel is a 44 y.o. female who presents for Painful Urination (X today )        Dysuria    This is a new problem. The current episode started in the past 7 days. Associated symptoms include frequency and urgency. Pertinent negatives include no chills, flank pain, hematuria, nausea or vomiting.     Patient comes to clinic describing last 1 day of frequency and urgency.  She denies fevers chills.  She denies nausea vomiting.  She denies back pain or abdominal pain.  She denies past medical history of frequent urinary tract infection but notes intermittent over her life.  Denies history of pyelonephritis or complicated UTI.  Denies chance of pregnancy.  Notes menstrual period should be starting soon.  Onset of symptoms today.    Review of Systems   Constitutional: Negative for chills and fever.   Respiratory: Negative for shortness of breath.    Gastrointestinal: Negative for abdominal pain, nausea and vomiting.   Genitourinary: Positive for frequency and urgency. Negative for dysuria, flank pain and hematuria.   Skin: Negative for rash.     No Known Allergies   Objective:   /76   Pulse 74   Temp 36.4 °C (97.6 °F)   Resp 16   Ht 1.598 m (5' 2.91\")   Wt 68 kg (150 lb)   SpO2 96%   BMI 26.65 kg/m²   Physical Exam   Constitutional: She is oriented to person, place, and time. She appears well-developed and well-nourished. No distress.   HENT:   Head: Normocephalic and atraumatic.   Right Ear: External ear normal.   Left Ear: External ear normal.   Nose: Nose normal.   Eyes: Conjunctivae and lids are normal. Right eye exhibits no discharge. Left eye exhibits no discharge. No scleral icterus.   Neck: Neck supple.   Pulmonary/Chest: Effort normal. No respiratory distress.   Abdominal: Soft. Normal appearance. There is no tenderness ( ). There is no rigidity, no guarding and no CVA tenderness.   Musculoskeletal: Normal range of motion.   Neurological: She is alert and oriented to person, " place, and time. She is not disoriented.   Skin: Skin is warm and dry. She is not diaphoretic. No erythema. No pallor.   Psychiatric: Her speech is normal and behavior is normal.   Nursing note and vitals reviewed.    Results for orders placed or performed in visit on 07/31/19   POCT Urinalysis   Result Value Ref Range    POC Color light yellow Negative    POC Appearance cloudy Negative    POC Leukocyte Esterase large Negative    POC Nitrites neg Negative    POC Urobiligen 0.2 Negative (0.2) mg/dL    POC Protein neg Negative mg/dL    POC Urine PH 6.5 5.0 - 8.0    POC Blood trace Negative    POC Specific Gravity 1.010 <1.005 - >1.030    POC Ketones neg Negative mg/dL    POC Bilirubin neg Negative mg/dL    POC Glucose neg Negative mg/dL   POCT PREGNANCY   Result Value Ref Range    POC Urine Pregnancy Test neg Negative    Internal Control Positive Positive     Internal Control Negative Negative            Assessment/Plan:   1. Acute cystitis with hematuria  - POCT Urinalysis  - POCT PREGNANCY  - nitrofurantoin monohyd macro (MACROBID) 100 MG Cap; Take 1 Cap by mouth every 12 hours for 5 days.  Dispense: 10 Cap; Refill: 0  - phenazopyridine (PYRIDIUM) 200 MG Tab; Take 1 Tab by mouth 3 times a day as needed.  Dispense: 6 Tab; Refill: 0    2. Dysuria  Supportive care is reviewed with patient/caregiver - recommend to push PO fluids and electrolytes, nsaids/tylenol, rest, full course of abx, probiotics w/ abx, azo/cran, observe for resolution  Return to clinic with lack of resolution or progression of symptoms.    Patient is very concerned with needing ciprofloxacin antibiotic today-I discussed with her that this is a medicine or avoiding using tubes tendinitis as well as weakened tendons and poor local coverage -based on her persistence I agree to send her a 3-day course of Cipro if symptoms persist at the end of Macrobid.        Differential diagnosis, natural history, supportive care, and indications for immediate  follow-up discussed.

## 2019-08-23 ENCOUNTER — OFFICE VISIT (OUTPATIENT)
Dept: MEDICAL GROUP | Facility: MEDICAL CENTER | Age: 44
End: 2019-08-23
Payer: COMMERCIAL

## 2019-08-23 VITALS
TEMPERATURE: 96.8 F | SYSTOLIC BLOOD PRESSURE: 138 MMHG | WEIGHT: 148 LBS | RESPIRATION RATE: 16 BRPM | DIASTOLIC BLOOD PRESSURE: 90 MMHG | HEART RATE: 83 BPM | BODY MASS INDEX: 26.22 KG/M2 | HEIGHT: 63 IN | OXYGEN SATURATION: 98 %

## 2019-08-23 DIAGNOSIS — Z13.29 THYROID DISORDER SCREEN: ICD-10-CM

## 2019-08-23 DIAGNOSIS — E55.9 VITAMIN D DEFICIENCY: ICD-10-CM

## 2019-08-23 DIAGNOSIS — R74.8 ELEVATED LIVER ENZYMES: ICD-10-CM

## 2019-08-23 DIAGNOSIS — E78.5 DYSLIPIDEMIA ASSOCIATED WITH TYPE 2 DIABETES MELLITUS (HCC): ICD-10-CM

## 2019-08-23 DIAGNOSIS — R09.89 LYMPH NODE SYMPTOM: ICD-10-CM

## 2019-08-23 DIAGNOSIS — Z23 NEED FOR VACCINATION: ICD-10-CM

## 2019-08-23 DIAGNOSIS — E11.69 DYSLIPIDEMIA ASSOCIATED WITH TYPE 2 DIABETES MELLITUS (HCC): ICD-10-CM

## 2019-08-23 PROCEDURE — 90471 IMMUNIZATION ADMIN: CPT | Performed by: NURSE PRACTITIONER

## 2019-08-23 PROCEDURE — 90670 PCV13 VACCINE IM: CPT | Performed by: NURSE PRACTITIONER

## 2019-08-23 PROCEDURE — 99214 OFFICE O/P EST MOD 30 MIN: CPT | Mod: 25 | Performed by: NURSE PRACTITIONER

## 2019-08-23 RX ORDER — ERGOCALCIFEROL 1.25 MG/1
50000 CAPSULE ORAL
Qty: 12 CAP | Refills: 0 | Status: SHIPPED | OUTPATIENT
Start: 2019-08-23 | End: 2020-03-20

## 2019-08-23 NOTE — ASSESSMENT & PLAN NOTE
Remains uncontrolled with a1c 9.0 with labs in April, we have been waiting for her to follow-up since that time.  She has been more consistent with metformin the last few months. She has been taking 2 with breakfast and 2 at dinner  She has cut out soda. Eating more salad, fruid. She has not had time to exercise.  She is interested in referral to dietitian.  This is been completed last year but apparently she was unable to schedule.  She has recently had a UTI but otherwise denies polyuria, polydipsia, numbness or tingling in extremities, vision changes.  Reportedly up-to-date on eye exam.  Kidney function is normal

## 2019-08-23 NOTE — PROGRESS NOTES
Subjective:     Chief Complaint   Patient presents with   • Follow-Up     Sybil Manuel is a 44 y.o. female here today to follow up on:    Diabetes mellitus type 2, uncontrolled, with complications (HCC)  Remains uncontrolled with a1c 9.0 with labs in April, we have been waiting for her to follow-up since that time.  She has been more consistent with metformin the last few months. She has been taking 2 with breakfast and 2 at dinner  She has cut out soda. Eating more salad, fruid. She has not had time to exercise.  She is interested in referral to dietitian.  This is been completed last year but apparently she was unable to schedule.  She has recently had a UTI but otherwise denies polyuria, polydipsia, numbness or tingling in extremities, vision changes.  Reportedly up-to-date on eye exam.  Kidney function is normal    Dyslipidemia associated with type 2 diabetes mellitus (HCC)  Recent labs reviewed, may benefit from addition of statin    Elevated liver enzymes  Slight AST and ALT elevation in the last several labs, no prior liver imaging.  No reported abdominal pain, nausea, stool changes, bruising  Component      Latest Ref Rng & Units 4/18/2019          10:23 AM   Cholesterol,Tot      100 - 199 mg/dL 205 (H)   Triglycerides      0 - 149 mg/dL 136   HDL      >=40 mg/dL 38 (A)   LDL      <100 mg/dL 140 (H)     Vitamin D deficiency  Labs from April reviewed, vit D 13 and not on supplement       Current medicines (including changes today)  Current Outpatient Medications   Medication Sig Dispense Refill   • vitamin D, Ergocalciferol, (DRISDOL) 98825 units Cap capsule Take 1 Cap by mouth every 7 days. 12 Cap 0   • metFORMIN ER (GLUCOPHAGE XR) 500 MG TABLET SR 24 HR TAKE 2 TABLETS BY MOUTH TWICE DAILY 360 Tab 1   • LORATADINE Take 10 mg by mouth 1 time daily as needed.     • Multiple Vitamins-Minerals (MULTIVITAL PO) Take  by mouth.       No current facility-administered medications for this visit.      She  has a  "past medical history of Allergy, Anxiety, ASTHMA (9/3/2010), Depression (2008), Diabetes mellitus type II (9/3/2010), Elevated liver enzymes (2009), GERD (gastroesophageal reflux disease), Hyperlipidemia, Urinary tract infection, site not specified, and Vitamin D deficiency (2009). She also has no past medical history of Addisons disease (HCC), Adrenal disorder (HCC), Anemia, Arrhythmia, Arthritis, Blood transfusion, CATARACT, CHF (congestive heart failure) (HCC), Clotting disorder (HCC), COPD, Cushings syndrome (HCC), EMPHYSEMA, Glaucoma, Goiter, Headache(784.0), Heart attack (HCC), Heart murmur, Hypertension, IBD (inflammatory bowel disease), Kidney disease, Meningitis, Migraine, Muscle disorder, OSTEOPOROSIS, Parathyroid disorder (HCC), Pituitary disease (HCC), Seizure (HCC), Stroke (HCC), Substance abuse (HCC), Thyroid disease, Tuberculosis, or Ulcer.    ROS included above     Objective:     /90 (BP Location: Left arm, Patient Position: Sitting, BP Cuff Size: Adult)   Pulse 83   Temp 36 °C (96.8 °F) (Temporal)   Resp 16   Ht 1.598 m (5' 2.91\")   Wt 67.1 kg (148 lb)   SpO2 98%  Body mass index is 26.29 kg/m².     Physical Exam:  General: Alert, oriented in no acute distress.  Eye contact is good, speech is normal, affect calm  HEENT: Oral mucosa pink moist, no lesions. TMs gray with good landmarks bilaterally. No lymphadenopathy.  Lungs: clear to auscultation bilaterally, normal effort, no wheeze/ rhonchi/ rales.  CV: regular rate and rhythm, S1, S2, no murmur  Abdomen: soft, nontender  Ext: no edema, color normal, vascularity normal, temperature normal    Assessment and Plan:   The following treatment plan was discussed   1. Diabetes mellitus type 2, uncontrolled, with complications (HCC)   uncontrolled.  Discussed importance of consistent follow-up and medication compliance.  She is feeling motivated and ready to take charge of her health.  We will repeat labs, may need to adjust medication pending " results.  Discussed addition of statin and ACE.  I will follow-up with her with lab results  Comp Metabolic Panel    Lipid Profile    HEMOGLOBIN A1C    REFERRAL TO [facility]PROV[facility] PROGRAMS (HIP) Services Requested: Registered Dietitian for Medical Nutrition Therapy; Reason for Visit: Medical Condition Requiring Nutrition Counseling   2. Vitamin D deficiency   recent labs reviewed, start weekly prescription supplement x12 weeks then transition to 4000 international units of vitamin D3 daily  vitamin D, Ergocalciferol, (DRISDOL) 33794 units Cap capsule   3. Elevated liver enzymes   we will obtain ultrasound of the liver   4. Dyslipidemia associated with type 2 diabetes mellitus (HCC)   consider addition of statin.  She would like to recheck labs as listed above, I will follow-up with her pending results   5. Lymph node symptom   no abnormality on exam.  We have discussed that lymph node size can increase and decrease depending on what is going on with the immune system, allergies or congestion no unusual lymph node enlargement at this time.  Continue to monitor   6. Need for vaccination  I have placed the below orders and discussed them with an approved delegating provider. The MA is performing the below orders under the direction of Dr. Jarrett  Pneumococcal Conjugate Vaccine 13-Valent   7. Thyroid disorder screen  TSH WITH REFLEX TO FT4       Followup: Pending labs         Please note that this dictation was created using voice recognition software. I have worked with consultants from the vendor as well as technical experts from [facility] to optimize the interface. I have made every reasonable attempt to correct obvious errors, but I expect that there are errors of grammar and possibly content that I did not discover before finalizing the note.

## 2019-08-23 NOTE — ASSESSMENT & PLAN NOTE
Slight AST and ALT elevation in the last several labs, no prior liver imaging.  No reported abdominal pain, nausea, stool changes, bruising

## 2019-09-13 ENCOUNTER — HOSPITAL ENCOUNTER (OUTPATIENT)
Dept: RADIOLOGY | Facility: MEDICAL CENTER | Age: 44
End: 2019-09-13
Attending: NURSE PRACTITIONER
Payer: COMMERCIAL

## 2019-09-13 ENCOUNTER — HOSPITAL ENCOUNTER (OUTPATIENT)
Dept: LAB | Facility: MEDICAL CENTER | Age: 44
End: 2019-09-13
Attending: NURSE PRACTITIONER
Payer: COMMERCIAL

## 2019-09-13 DIAGNOSIS — R74.8 ELEVATED LIVER ENZYMES: ICD-10-CM

## 2019-09-13 DIAGNOSIS — Z13.29 THYROID DISORDER SCREEN: ICD-10-CM

## 2019-09-13 LAB
ALBUMIN SERPL BCP-MCNC: 4.5 G/DL (ref 3.2–4.9)
ALBUMIN/GLOB SERPL: 1.3 G/DL
ALP SERPL-CCNC: 69 U/L (ref 30–99)
ALT SERPL-CCNC: 38 U/L (ref 2–50)
ANION GAP SERPL CALC-SCNC: 9 MMOL/L (ref 0–11.9)
AST SERPL-CCNC: 29 U/L (ref 12–45)
BILIRUB SERPL-MCNC: 0.4 MG/DL (ref 0.1–1.5)
BUN SERPL-MCNC: 11 MG/DL (ref 8–22)
CALCIUM SERPL-MCNC: 9.4 MG/DL (ref 8.5–10.5)
CHLORIDE SERPL-SCNC: 104 MMOL/L (ref 96–112)
CHOLEST SERPL-MCNC: 195 MG/DL (ref 100–199)
CO2 SERPL-SCNC: 25 MMOL/L (ref 20–33)
CREAT SERPL-MCNC: 0.71 MG/DL (ref 0.5–1.4)
EST. AVERAGE GLUCOSE BLD GHB EST-MCNC: 157 MG/DL
FASTING STATUS PATIENT QL REPORTED: NORMAL
GLOBULIN SER CALC-MCNC: 3.4 G/DL (ref 1.9–3.5)
GLUCOSE SERPL-MCNC: 143 MG/DL (ref 65–99)
HBA1C MFR BLD: 7.1 % (ref 0–5.6)
HDLC SERPL-MCNC: 36 MG/DL
LDLC SERPL CALC-MCNC: 123 MG/DL
POTASSIUM SERPL-SCNC: 4.6 MMOL/L (ref 3.6–5.5)
PROT SERPL-MCNC: 7.9 G/DL (ref 6–8.2)
SODIUM SERPL-SCNC: 138 MMOL/L (ref 135–145)
TRIGL SERPL-MCNC: 179 MG/DL (ref 0–149)
TSH SERPL DL<=0.005 MIU/L-ACNC: 1.61 UIU/ML (ref 0.38–5.33)

## 2019-09-13 PROCEDURE — 36415 COLL VENOUS BLD VENIPUNCTURE: CPT

## 2019-09-13 PROCEDURE — 80061 LIPID PANEL: CPT

## 2019-09-13 PROCEDURE — 83036 HEMOGLOBIN GLYCOSYLATED A1C: CPT

## 2019-09-13 PROCEDURE — 80053 COMPREHEN METABOLIC PANEL: CPT

## 2019-09-13 PROCEDURE — 84443 ASSAY THYROID STIM HORMONE: CPT

## 2019-09-13 PROCEDURE — 76705 ECHO EXAM OF ABDOMEN: CPT

## 2020-01-29 DIAGNOSIS — E11.9 NEWLY DIAGNOSED DIABETES (HCC): ICD-10-CM

## 2020-01-30 RX ORDER — METFORMIN HYDROCHLORIDE 500 MG/1
TABLET, EXTENDED RELEASE ORAL
Qty: 360 TAB | Refills: 0 | Status: SHIPPED | OUTPATIENT
Start: 2020-01-30 | End: 2020-03-20

## 2020-02-03 ENCOUNTER — OFFICE VISIT (OUTPATIENT)
Dept: MEDICAL GROUP | Facility: MEDICAL CENTER | Age: 45
End: 2020-02-03
Payer: COMMERCIAL

## 2020-02-03 VITALS
RESPIRATION RATE: 16 BRPM | OXYGEN SATURATION: 99 % | SYSTOLIC BLOOD PRESSURE: 138 MMHG | HEART RATE: 88 BPM | DIASTOLIC BLOOD PRESSURE: 76 MMHG | TEMPERATURE: 98.2 F | WEIGHT: 149.47 LBS | BODY MASS INDEX: 26.48 KG/M2 | HEIGHT: 63 IN

## 2020-02-03 DIAGNOSIS — R05.9 COUGH: ICD-10-CM

## 2020-02-03 PROCEDURE — 99213 OFFICE O/P EST LOW 20 MIN: CPT | Performed by: INTERNAL MEDICINE

## 2020-02-03 RX ORDER — BENZONATATE 100 MG/1
100 CAPSULE ORAL 3 TIMES DAILY PRN
Qty: 60 CAP | Refills: 0 | Status: SHIPPED
Start: 2020-02-03 | End: 2020-04-17

## 2020-02-03 ASSESSMENT — PATIENT HEALTH QUESTIONNAIRE - PHQ9: CLINICAL INTERPRETATION OF PHQ2 SCORE: 0

## 2020-02-03 NOTE — LETTER
February 3, 2020       Patient: Sybil Manuel   YOB: 1975   Date of Visit: 2/3/2020         To Whom It May Concern:    It is my medical opinion that Sybil Manuel excused from work from 2/3/20 through 2/4/20 for an acute illness.    If you have any questions or concerns, please don't hesitate to call [phone]          Sincerely,          Angelia Ramirez M.D.  Electronically Signed

## 2020-02-04 NOTE — PROGRESS NOTES
CC:  The encounter diagnosis was Cough.    HISTORY OF THE PRESENT ILLNESS: Patient is a 44 y.o. female. This pleasant patient is here today for same-day visit for acute illness.    She says she started feeling sick last Wednesday, generalized fatigue, generalized body/joint aches, sweaty particularly in the evening, chilly feeling hot, cough, related sore throat, poor sleep due to cough.  Initially she had a wheezes totally resolved.  She says that she had been coughing up some green phlegm but is now clear over the past 24 hours.  Body aches are responding well to ibuprofen.  Initially had some diarrhea and this also resolved.  No dyspnea, ear/eye symptoms, sinus pain, purulent sinus drainage or other associated symptoms.  She says overall she does feel like she is getting better.  She is mainly looking for a medication that can suppress her cough at night so she can get some sleep and a work note to be excused for today and tomorrow.  For her symptoms she is taking Bre-[place] cold and flu.      Allergies: Patient has no known allergies.    Current Outpatient Medications Ordered in Epic   Medication Sig Dispense Refill   • benzonatate (TESSALON) 100 MG Cap Take 1 Cap by mouth 3 times a day as needed for Cough. 60 Cap 0   • metFORMIN ER (GLUCOPHAGE XR) 500 MG TABLET SR 24 HR TAKE 2 TABLETS BY MOUTH TWICE DAILY 360 Tab 0   • vitamin D, Ergocalciferol, (DRISDOL) 39907 units Cap capsule Take 1 Cap by mouth every 7 days. 12 Cap 0   • LORATADINE Take 10 mg by mouth 1 time daily as needed.     • Multiple Vitamins-Minerals (MULTIVITAL PO) Take  by mouth.       No current Epic-ordered facility-administered medications on file.        Past Medical History:   Diagnosis Date   • Allergy    • Anxiety     Lorazepam rarely   • ASTHMA 9/3/2010   • Depression 2008    no antidepressant   • Diabetes mellitus type II 9/3/2010   • Elevated liver enzymes 2009   • GERD (gastroesophageal reflux disease)     occasional   • Hyperlipidemia     • Urinary tract infection, site not specified    • Vitamin D deficiency 2009       Past Surgical History:   Procedure Laterality Date   • PRIMARY C SECTION     • TUBAL COAGULATION LAPAROSCOPIC BILATERAL         Social History     Tobacco Use   • Smoking status: Never Smoker   • Smokeless tobacco: Never Used   Substance Use Topics   • Alcohol use: Yes     Alcohol/week: 0.0 oz     Comment: 4 per year   • Drug use: No       Social History     Patient does not qualify to have social determinant information on file (likely too young).   Social History Narrative   • Not on file       Family History   Problem Relation Age of Onset   • Heart Disease Mother         65 yo stents, pacemaker x2   • Hypertension Mother    • Hyperlipidemia Mother    • Arrythmia Mother    • Diabetes Maternal Grandmother    • Hyperlipidemia Brother        ROS:     - Constitutional: See HPI  - Eyes:   Negative for blurry vision, eye pain, discharge    - ENT:  Negative for hearing changes, ear pain, ear discharge    - Respiratory: Negative for dyspnea, wheezing, and crackles.      - Cardiovascular: Negative for chest pain, palpitations, orthopnea, and bilateral lower extremity edema.     - Gastrointestinal: Negative for heartburn, nausea, vomiting, abdominal pain, hematochezia, melena, diarrhea, constipation, and greasy/foul-smelling stools.     - Genitourinary: Negative for dysuria    - Musculoskeletal: See HPI    - Skin: Negative for rash, itching, cyanotic skin color change.     - Neurological: Negative for migraines, numbness, ataxia, tremors, vertigo    - Endo:Negative for polyuria, heat/cold intolerance, excessive thirst    - Hem/lymphatic: Negative for easy bruising, blood clots, lymphedema, swollen glands    -Allergic/immun: Negative for allergic rhinitis    - Psychiatric/Behavioral: Negative for depression, suicidal/homicidal ideation and memory loss.      Exam: /76 (BP Location: Right arm, Patient Position: Sitting, BP Cuff Size:  "Adult)   Pulse 88   Temp 36.8 °C (98.2 °F) (Temporal)   Resp 16   Ht 1.598 m (5' 2.91\")   Wt 67.8 kg (149 lb 7.6 oz)   SpO2 99%  Body mass index is 26.55 kg/m².    General: Normal appearing. No distress.  EYES: Conjunctiva clear lids without ptosis, pupils equal  EARS: Normal shape and contour, ear canals without erythema bilaterally and no significant acute changes  NOSE, THROAT: nasal mucosa benign. oropharynx is without erythema, edema or exudates.   Neck: Supple without LAD. Thyroid is not enlarged.  Pulmonary: Clear to ausculation.  Normal effort. No rales or wheezing.  Cardiovascular: Regular rate and rhythm without significant murmur.   Abdomen: Soft, nontender, nondistended. Normal bowel sounds.  Neurologic: Cranial nerves grossly nonfocal  Lymph: No cervical, supraclavicular nodes palpable  Skin: Warm and dry.  No obvious lesions.  Musculoskeletal: Normal gait. No extremity cyanosis, clubbing, or edema.  Psych: Normal mood and affect. Alert and oriented x3. Judgment and insight is normal.      Assessment/Plan  1. Cough  Suspect this was upper respiratory viral illness. Already patient is having resolution of many of her symptoms. Still residual bothersome cough, now clear phlegm, but nonetheless cough is keeping her up at night.  She may continue Bre-[place] cold and flu and use Tessalon as needed as a cough suppressant.  Patient was advised if she does not continue to feel better every day with resolution of symptoms in 7 to 10 days, she may send me a chart email and at that time we could consider antibiotic but together we felt it was not warranted at this time as likely her symptoms are viral.  - benzonatate (TESSALON) 100 MG Cap; Take 1 Cap by mouth 3 times a day as needed for Cough.  Dispense: 60 Cap; Refill: 0      Follow-up with PCP as needed        Please note that this dictation was created using voice recognition software. I have made every reasonable attempt to correct obvious errors, but " I expect that there are errors of grammar and possibly content that I did not discover before finalizing the note.

## 2020-02-06 ENCOUNTER — PATIENT MESSAGE (OUTPATIENT)
Dept: MEDICAL GROUP | Facility: MEDICAL CENTER | Age: 45
End: 2020-02-06

## 2020-02-06 DIAGNOSIS — J98.8 RESPIRATORY INFECTION: ICD-10-CM

## 2020-02-06 RX ORDER — AZITHROMYCIN 250 MG/1
TABLET, FILM COATED ORAL
Qty: 6 TAB | Refills: 0 | Status: SHIPPED | OUTPATIENT
Start: 2020-02-06 | End: 2020-02-11

## 2020-02-11 ENCOUNTER — PATIENT MESSAGE (OUTPATIENT)
Dept: MEDICAL GROUP | Facility: MEDICAL CENTER | Age: 45
End: 2020-02-11

## 2020-02-11 ENCOUNTER — HOSPITAL ENCOUNTER (OUTPATIENT)
Dept: RADIOLOGY | Facility: MEDICAL CENTER | Age: 45
End: 2020-02-11
Attending: PHYSICIAN ASSISTANT
Payer: COMMERCIAL

## 2020-02-11 ENCOUNTER — OFFICE VISIT (OUTPATIENT)
Dept: URGENT CARE | Facility: PHYSICIAN GROUP | Age: 45
End: 2020-02-11
Payer: COMMERCIAL

## 2020-02-11 VITALS
HEART RATE: 116 BPM | SYSTOLIC BLOOD PRESSURE: 134 MMHG | DIASTOLIC BLOOD PRESSURE: 84 MMHG | HEIGHT: 62 IN | OXYGEN SATURATION: 95 % | WEIGHT: 149 LBS | TEMPERATURE: 98.9 F | BODY MASS INDEX: 27.42 KG/M2 | RESPIRATION RATE: 20 BRPM

## 2020-02-11 DIAGNOSIS — R06.2 WHEEZING: ICD-10-CM

## 2020-02-11 DIAGNOSIS — J18.9 COMMUNITY ACQUIRED PNEUMONIA OF RIGHT LOWER LOBE OF LUNG: ICD-10-CM

## 2020-02-11 DIAGNOSIS — R50.9 FEVER, UNSPECIFIED FEVER CAUSE: ICD-10-CM

## 2020-02-11 DIAGNOSIS — R05.9 COUGH: ICD-10-CM

## 2020-02-11 LAB
FLUAV+FLUBV AG SPEC QL IA: NEGATIVE
INT CON NEG: NEGATIVE
INT CON POS: POSITIVE

## 2020-02-11 PROCEDURE — 87804 INFLUENZA ASSAY W/OPTIC: CPT | Performed by: PHYSICIAN ASSISTANT

## 2020-02-11 PROCEDURE — 71046 X-RAY EXAM CHEST 2 VIEWS: CPT

## 2020-02-11 PROCEDURE — 94760 N-INVAS EAR/PLS OXIMETRY 1: CPT | Performed by: PHYSICIAN ASSISTANT

## 2020-02-11 PROCEDURE — 94640 AIRWAY INHALATION TREATMENT: CPT | Performed by: PHYSICIAN ASSISTANT

## 2020-02-11 PROCEDURE — 99214 OFFICE O/P EST MOD 30 MIN: CPT | Mod: 25 | Performed by: PHYSICIAN ASSISTANT

## 2020-02-11 RX ORDER — ALBUTEROL SULFATE 2.5 MG/3ML
2.5 SOLUTION RESPIRATORY (INHALATION) ONCE
Status: COMPLETED | OUTPATIENT
Start: 2020-02-11 | End: 2020-02-11

## 2020-02-11 RX ORDER — DOXYCYCLINE 100 MG/1
100 CAPSULE ORAL 2 TIMES DAILY
Qty: 10 CAP | Refills: 0 | Status: SHIPPED | OUTPATIENT
Start: 2020-02-11 | End: 2020-02-16

## 2020-02-11 RX ORDER — PREDNISONE 20 MG/1
20 TABLET ORAL DAILY
Qty: 5 TAB | Refills: 0 | Status: SHIPPED | OUTPATIENT
Start: 2020-02-11 | End: 2020-02-16

## 2020-02-11 RX ORDER — ALBUTEROL SULFATE 90 UG/1
2 AEROSOL, METERED RESPIRATORY (INHALATION) EVERY 6 HOURS PRN
Qty: 8.5 G | Refills: 0 | Status: SHIPPED | OUTPATIENT
Start: 2020-02-11 | End: 2021-01-08 | Stop reason: SDUPTHER

## 2020-02-11 RX ORDER — AMOXICILLIN AND CLAVULANATE POTASSIUM 875; 125 MG/1; MG/1
1 TABLET, FILM COATED ORAL 2 TIMES DAILY
Qty: 10 TAB | Refills: 0 | Status: SHIPPED | OUTPATIENT
Start: 2020-02-11 | End: 2020-02-16

## 2020-02-11 RX ADMIN — ALBUTEROL SULFATE 2.5 MG: 2.5 SOLUTION RESPIRATORY (INHALATION) at 10:25

## 2020-02-11 ASSESSMENT — ENCOUNTER SYMPTOMS
EYE DISCHARGE: 0
RHINORRHEA: 1
HEADACHES: 1
MYALGIAS: 1
VOMITING: 0
SORE THROAT: 0
NAUSEA: 0
EYE REDNESS: 0
SHORTNESS OF BREATH: 1
FEVER: 1
COUGH: 1
WHEEZING: 1

## 2020-02-11 NOTE — PROGRESS NOTES
Subjective:      Sybil Manuel is a 44 y.o. female who presents with Cough (Cough, fever, bodyaches x2.5weeks )        Cough   This is a new problem. Episode onset: x 2.5 weeks ago. The problem has been gradually worsening. The problem occurs constantly. The cough is non-productive. Associated symptoms include a fever (The patient reports an intermittent fever onset yesterday. The patient reports a max temp of 101.), headaches (intermittent), myalgias, nasal congestion, rhinorrhea, shortness of breath (intermittent - worse at night) and wheezing (intermittent). Pertinent negatives include no chest pain, ear pain, eye redness, rash or sore throat. She has tried OTC cough suppressant (Bre [place] Cold and Flu) for the symptoms.     The patient presents to clinic secondary to a cough x2.5 weeks.  The patient states she initially had a cough with associated congestion.  The patient states her symptoms gradually improved with exception of her dry cough.  The patient was seen by her PCP last Monday, who prescribed the patient Azithromycin and Tessalon Perles.  The patient reports no improvement of her symptoms.  The patient states her symptoms acutely worsened x1 day ago.  The patient states that she developed recurrent congestion and fever.  The patient reports a max temp of 101.  The patient reports associated intermittent shortness of breath and wheezing.  No chest pain.  The patient taken Bre-Lay[place] cold and flu for her current symptoms.    PMH:  has a past medical history of Allergy, Anxiety, ASTHMA (9/3/2010), Depression (2008), Diabetes mellitus type II (9/3/2010), Elevated liver enzymes (2009), GERD (gastroesophageal reflux disease), Hyperlipidemia, Urinary tract infection, site not specified, and Vitamin D deficiency (2009). She also has no past medical history of Addisons disease (HCC), Adrenal disorder (HCC), Anemia, Arrhythmia, Arthritis, Blood transfusion, CATARACT, CHF (congestive heart failure) ([facility]),  Clotting disorder (HCC), COPD, Cushings syndrome (HCC), EMPHYSEMA, Glaucoma, Goiter, Headache(784.0), Heart attack (HCC), Heart murmur, Hypertension, IBD (inflammatory bowel disease), Kidney disease, Meningitis, Migraine, Muscle disorder, OSTEOPOROSIS, Parathyroid disorder (HCC), Pituitary disease (HCC), Seizure (HCC), Stroke (HCC), Substance abuse (HCC), Thyroid disease, Tuberculosis, or Ulcer.  MEDS:   Current Outpatient Medications:   •  azithromycin (ZITHROMAX) 250 MG Tab, Take 2 tabs on day 1, then take 1 tab on days 2-5 (Patient not taking: Reported on 2/11/2020), Disp: 6 Tab, Rfl: 0  •  benzonatate (TESSALON) 100 MG Cap, Take 1 Cap by mouth 3 times a day as needed for Cough. (Patient not taking: Reported on 2/11/2020), Disp: 60 Cap, Rfl: 0  •  metFORMIN ER (GLUCOPHAGE XR) 500 MG TABLET SR 24 HR, TAKE 2 TABLETS BY MOUTH TWICE DAILY, Disp: 360 Tab, Rfl: 0  •  vitamin D, Ergocalciferol, (DRISDOL) 03510 units Cap capsule, Take 1 Cap by mouth every 7 days., Disp: 12 Cap, Rfl: 0  •  LORATADINE, Take 10 mg by mouth 1 time daily as needed., Disp: , Rfl:   •  Multiple Vitamins-Minerals (MULTIVITAL PO), Take  by mouth., Disp: , Rfl:   ALLERGIES: No Known Allergies  SURGHX:   Past Surgical History:   Procedure Laterality Date   • PRIMARY C SECTION     • TUBAL COAGULATION LAPAROSCOPIC BILATERAL       SOCHX:  reports that she has never smoked. She has never used smokeless tobacco. She reports current alcohol use. She reports that she does not use drugs.  FH: Family history was reviewed, no pertinent findings to report      Review of Systems   Constitutional: Positive for fever (The patient reports an intermittent fever onset yesterday. The patient reports a max temp of 101.).   HENT: Positive for congestion and rhinorrhea. Negative for ear pain and sore throat.    Eyes: Negative for discharge and redness.   Respiratory: Positive for cough, shortness of breath (intermittent - worse at night) and wheezing (intermittent).   "  Cardiovascular: Negative for chest pain and leg swelling.   Gastrointestinal: Negative for nausea and vomiting.   Musculoskeletal: Positive for myalgias.   Skin: Negative for rash.   Neurological: Positive for headaches (intermittent).          Objective:     /84   Pulse (!) 116   Temp 37.2 °C (98.9 °F) (Temporal)   Resp 20   Ht 1.575 m (5' 2\")   Wt 67.6 kg (149 lb)   SpO2 95%   BMI 27.25 kg/m²      Physical Exam  Constitutional:       General: She is not in acute distress.     Appearance: Normal appearance.   HENT:      Head: Normocephalic and atraumatic.      Right Ear: Tympanic membrane, ear canal and external ear normal.      Left Ear: Tympanic membrane, ear canal and external ear normal.      Nose: Nose normal.      Mouth/Throat:      Mouth: Mucous membranes are moist.      Pharynx: Oropharynx is clear. No posterior oropharyngeal erythema.   Eyes:      Extraocular Movements: Extraocular movements intact.      Conjunctiva/sclera: Conjunctivae normal.   Neck:      Musculoskeletal: Normal range of motion and neck supple.   Cardiovascular:      Rate and Rhythm: Regular rhythm. Tachycardia present.      Heart sounds: Normal heart sounds.   Pulmonary:      Effort: Pulmonary effort is normal. No respiratory distress.      Breath sounds: Wheezing present.   Musculoskeletal: Normal range of motion.   Skin:     General: Skin is warm and dry.   Neurological:      Mental Status: She is alert and oriented to person, place, and time.            Progress:  CXR:   FINDINGS:  LUNGS: Ill-defined opacities in the right lower lobe. No effusions.  PNEUMOTHORAX: None.  LINES AND TUBES: None.  MEDIASTINUM: No cardiomegaly.  MUSCULOSKELETAL STRUCTURES: No acute displaced fracture.     IMPRESSION:  Ill-defined opacities in the right lower lobe, suggestive of developing pneumonia    POCT Rapid Flu: Negative     Albuterol Nebulizer given in clinic.   The patient reports improvement of her symptoms after the albuterol " breathing treatment.  On re-auscultation, the patient's lung sounds were improved without wheezing.  POX: 99% on room air    Assessment/Plan:     1. Fever, unspecified fever cause  - POCT Influenza A/B    2. Community acquired pneumonia of right lower lobe of lung (HCC)  - DX-CHEST-2 VIEWS; Future  - amoxicillin-clavulanate (AUGMENTIN) 875-125 MG Tab; Take 1 Tab by mouth 2 times a day for 5 days.  Dispense: 10 Tab; Refill: 0  - doxycycline (MONODOX) 100 MG capsule; Take 1 Cap by mouth 2 times a day for 5 days.  Dispense: 10 Cap; Refill: 0  - predniSONE (DELTASONE) 20 MG Tab; Take 1 Tab by mouth every day for 5 days.  Dispense: 5 Tab; Refill: 0    3. Wheezing  - albuterol (PROVENTIL) 2.5mg/3ml nebulizer solution 2.5 mg  - predniSONE (DELTASONE) 20 MG Tab; Take 1 Tab by mouth every day for 5 days.  Dispense: 5 Tab; Refill: 0  - albuterol 108 (90 Base) MCG/ACT Aero Soln inhalation aerosol; Inhale 2 Puffs by mouth every 6 hours as needed for Shortness of Breath.  Dispense: 8.5 g; Refill: 0    The patient's presenting symptoms and physical exam are consistent with community-acquired pneumonia of the right lower lung with associated fever and wheezing.  On physical exam, the patient had diffuse wheezing with auscultation.  The patient's pulse ox was within normal limits.  The patient's rapid flu test today in clinic was negative, indicating the patient symptoms are unlikely due to the influenza virus.  The patient's chest x-ray today in clinic showed ill-defined opacities in the right lower lobe suggestive of developing pneumonia.  The patient was given albuterol nebulizer treatment today in clinic with improvement of her wheezing.  Will prescribe the patient Augmentin and Doxycycline for her acute community-acquired pneumonia.  Will also prescribe the patient Prednisone and Albuterol inhaler for her concurrent wheezing.  The patient appears in no acute distress.  The patient's vital signs are stable and within normal  limits with the exception of tachycardia -likely secondary to her acute infection.  Recommend OTC medications and supportive care for symptomatic management.  Recommend the patient follow-up with her PCP.  Discussed STRICT ED precautions with the patient, and she verbalized understanding.    Differential diagnoses, supportive care, and indications for immediate follow-up discussed with patient.   Instructed to return to clinic or nearest emergency department for any change in condition, further concerns, or worsening of symptoms.    OTC Tylenol or Motrin for fever/discomfort.  OTC cough/cold medication for symptomatic relief  OTC Supportive Care for Congestion - saline nasal spray or neti pot  Continue Tessalon Perles as prescribed  Drink plenty of fluids  Follow-up with PCP  Return to clinic or go to the ED if symptoms worsen or fail to improve, or if the patient should develop worsening/increasing cough, congestion, ear pain, sore throat, shortness of breath, wheezing, chest pain, fever/chills, and/or any concerning symptoms.    Discussed plan with the patient, and she agrees to the above.     Please note that this dictation was created using voice recognition software. I have made every reasonable attempt to correct obvious errors, but I expect that there may be errors of grammar and possibly content that I did not discover before finalizing the note.

## 2020-02-11 NOTE — LETTER
[facility] [address] [address]     February 11, 2020    Patient: Sybil Manuel   YOB: 1975   Date of Visit: 2/11/2020       To Whom It May Concern:    Sybil Manuel was seen and treated in our department on 2/11/2020. Please excuse her from work today and tomorrow, 2/11-2/12.    Sincerely,     Latricia Teresa P.A.-C.

## 2020-02-12 RX ORDER — ALBUTEROL SULFATE 2.5 MG/3ML
2.5 SOLUTION RESPIRATORY (INHALATION) EVERY 4 HOURS PRN
Qty: 30 BULLET | Refills: 0 | Status: SHIPPED | OUTPATIENT
Start: 2020-02-12 | End: 2022-01-31

## 2020-02-18 ENCOUNTER — TELEPHONE (OUTPATIENT)
Dept: MEDICAL GROUP | Facility: MEDICAL CENTER | Age: 45
End: 2020-02-18

## 2020-02-19 NOTE — TELEPHONE ENCOUNTER
VOICEMAIL  1. Caller Name: Sybil Manuel                        Call Back Number: [phone]      2. Message: Left Vm for pt to cb to sched appt and get update on health    3. Patient approves office to leave a detailed voicemail/MyChart message: yes

## 2020-03-19 DIAGNOSIS — E55.9 VITAMIN D DEFICIENCY: ICD-10-CM

## 2020-03-19 DIAGNOSIS — E11.9 NEWLY DIAGNOSED DIABETES (HCC): ICD-10-CM

## 2020-03-20 RX ORDER — METFORMIN HYDROCHLORIDE 500 MG/1
TABLET, EXTENDED RELEASE ORAL
Qty: 360 TAB | Refills: 0 | Status: SHIPPED | OUTPATIENT
Start: 2020-03-20 | End: 2020-06-19

## 2020-03-20 RX ORDER — ERGOCALCIFEROL 1.25 MG/1
CAPSULE ORAL
Qty: 12 CAP | Refills: 0 | Status: SHIPPED | OUTPATIENT
Start: 2020-03-20 | End: 2020-06-19

## 2020-03-21 ENCOUNTER — APPOINTMENT (OUTPATIENT)
Dept: RADIOLOGY | Facility: MEDICAL CENTER | Age: 45
End: 2020-03-21
Attending: NURSE PRACTITIONER
Payer: COMMERCIAL

## 2020-03-21 DIAGNOSIS — Z12.31 VISIT FOR SCREENING MAMMOGRAM: ICD-10-CM

## 2020-03-24 ENCOUNTER — TELEPHONE (OUTPATIENT)
Dept: HEALTH INFORMATION MANAGEMENT | Facility: OTHER | Age: 45
End: 2020-03-24

## 2020-03-24 DIAGNOSIS — E78.5 DYSLIPIDEMIA ASSOCIATED WITH TYPE 2 DIABETES MELLITUS (HCC): ICD-10-CM

## 2020-03-24 DIAGNOSIS — E11.69 DYSLIPIDEMIA ASSOCIATED WITH TYPE 2 DIABETES MELLITUS (HCC): ICD-10-CM

## 2020-03-24 NOTE — TELEPHONE ENCOUNTER
Good afternoon,     Patients ia requesting orders for A1C and fasting Lipid Profile. Please review     Thank you

## 2020-04-13 ENCOUNTER — HOSPITAL ENCOUNTER (OUTPATIENT)
Dept: LAB | Facility: MEDICAL CENTER | Age: 45
End: 2020-04-13
Attending: NURSE PRACTITIONER
Payer: COMMERCIAL

## 2020-04-13 DIAGNOSIS — E11.69 DYSLIPIDEMIA ASSOCIATED WITH TYPE 2 DIABETES MELLITUS (HCC): ICD-10-CM

## 2020-04-13 DIAGNOSIS — E78.5 DYSLIPIDEMIA ASSOCIATED WITH TYPE 2 DIABETES MELLITUS (HCC): ICD-10-CM

## 2020-04-13 LAB
ALBUMIN SERPL BCP-MCNC: 4.5 G/DL (ref 3.2–4.9)
ALBUMIN/GLOB SERPL: 1.3 G/DL
ALP SERPL-CCNC: 78 U/L (ref 30–99)
ALT SERPL-CCNC: 48 U/L (ref 2–50)
ANION GAP SERPL CALC-SCNC: 12 MMOL/L (ref 7–16)
AST SERPL-CCNC: 38 U/L (ref 12–45)
BILIRUB SERPL-MCNC: 0.2 MG/DL (ref 0.1–1.5)
BUN SERPL-MCNC: 12 MG/DL (ref 8–22)
CALCIUM SERPL-MCNC: 9.1 MG/DL (ref 8.5–10.5)
CHLORIDE SERPL-SCNC: 101 MMOL/L (ref 96–112)
CHOLEST SERPL-MCNC: 199 MG/DL (ref 100–199)
CO2 SERPL-SCNC: 24 MMOL/L (ref 20–33)
CREAT SERPL-MCNC: 0.54 MG/DL (ref 0.5–1.4)
FASTING STATUS PATIENT QL REPORTED: NORMAL
GLOBULIN SER CALC-MCNC: 3.5 G/DL (ref 1.9–3.5)
GLUCOSE SERPL-MCNC: 119 MG/DL (ref 65–99)
HDLC SERPL-MCNC: 32 MG/DL
LDLC SERPL CALC-MCNC: 124 MG/DL
POTASSIUM SERPL-SCNC: 4.7 MMOL/L (ref 3.6–5.5)
PROT SERPL-MCNC: 8 G/DL (ref 6–8.2)
SODIUM SERPL-SCNC: 137 MMOL/L (ref 135–145)
TRIGL SERPL-MCNC: 216 MG/DL (ref 0–149)

## 2020-04-13 PROCEDURE — 83036 HEMOGLOBIN GLYCOSYLATED A1C: CPT

## 2020-04-13 PROCEDURE — 36415 COLL VENOUS BLD VENIPUNCTURE: CPT

## 2020-04-13 PROCEDURE — 80061 LIPID PANEL: CPT

## 2020-04-13 PROCEDURE — 80053 COMPREHEN METABOLIC PANEL: CPT

## 2020-04-14 LAB
EST. AVERAGE GLUCOSE BLD GHB EST-MCNC: 157 MG/DL
HBA1C MFR BLD: 7.1 % (ref 0–5.6)

## 2020-04-17 ENCOUNTER — OFFICE VISIT (OUTPATIENT)
Dept: MEDICAL GROUP | Facility: MEDICAL CENTER | Age: 45
End: 2020-04-17
Payer: COMMERCIAL

## 2020-04-17 VITALS
DIASTOLIC BLOOD PRESSURE: 74 MMHG | WEIGHT: 151.9 LBS | HEART RATE: 81 BPM | BODY MASS INDEX: 27.95 KG/M2 | OXYGEN SATURATION: 100 % | TEMPERATURE: 99 F | HEIGHT: 62 IN | RESPIRATION RATE: 16 BRPM | SYSTOLIC BLOOD PRESSURE: 132 MMHG

## 2020-04-17 DIAGNOSIS — E11.69 DM TYPE 2 WITH DIABETIC DYSLIPIDEMIA (HCC): ICD-10-CM

## 2020-04-17 DIAGNOSIS — Z12.39 BREAST CANCER SCREENING: ICD-10-CM

## 2020-04-17 DIAGNOSIS — E11.69 DYSLIPIDEMIA ASSOCIATED WITH TYPE 2 DIABETES MELLITUS (HCC): ICD-10-CM

## 2020-04-17 DIAGNOSIS — E78.5 DYSLIPIDEMIA ASSOCIATED WITH TYPE 2 DIABETES MELLITUS (HCC): ICD-10-CM

## 2020-04-17 DIAGNOSIS — E78.5 DM TYPE 2 WITH DIABETIC DYSLIPIDEMIA (HCC): ICD-10-CM

## 2020-04-17 PROCEDURE — 99214 OFFICE O/P EST MOD 30 MIN: CPT | Performed by: NURSE PRACTITIONER

## 2020-04-17 RX ORDER — LISINOPRIL 5 MG/1
5 TABLET ORAL DAILY
Qty: 90 TAB | Refills: 1 | Status: SHIPPED | OUTPATIENT
Start: 2020-04-17 | End: 2021-02-19

## 2020-04-17 RX ORDER — ATORVASTATIN CALCIUM 10 MG/1
10 TABLET, FILM COATED ORAL DAILY
Qty: 90 TAB | Refills: 1 | Status: SHIPPED | OUTPATIENT
Start: 2020-04-17 | End: 2021-04-16

## 2020-04-17 ASSESSMENT — FIBROSIS 4 INDEX: FIB4 SCORE: 0.75

## 2020-04-17 NOTE — PROGRESS NOTES
Subjective:     Chief Complaint   Patient presents with   • Lab Results     lab results (A1c)      Sybil Manuel is a 44 y.o. female here today to follow up on:    Diabetes mellitus type 2, uncontrolled, with complications (HCC)  A1C remained stable at 7.1.  She admits that she has not been very good with her exercise regimen lately.  She is consistently taking metformin 1000 mg twice daily  No polyuria, polydipsia, numbness or tingling in extremities, blurred vision.  No chronic kidney disease    Dyslipidemia associated with type 2 diabetes mellitus (HCC)  Recent lipid panel reviewed, she is agreeable to trial of statin and understands this will reduce her risk for long-term complications.  She does have history of elevated liver enzymes but most recent liver function tests in normal range.     Due for mammogram    Current medicines (including changes today)  Current Outpatient Medications   Medication Sig Dispense Refill   • atorvastatin (LIPITOR) 10 MG Tab Take 1 Tab by mouth every day. 90 Tab 1   • lisinopril (PRINIVIL) 5 MG Tab Take 1 Tab by mouth every day. 90 Tab 1   • metFORMIN ER (GLUCOPHAGE XR) 500 MG TABLET SR 24 HR Take 2 tablets by mouth twice daily 360 Tab 0   • vitamin D, Ergocalciferol, (DRISDOL) 1.25 MG (02962 UT) Cap capsule Take 1 capsule by mouth once a week 12 Cap 0   • albuterol (PROVENTIL) 2.5mg/3ml Nebu Soln solution for nebulization 3 mL by Nebulization route every four hours as needed for Shortness of Breath. 30 Bullet 0   • albuterol 108 (90 Base) MCG/ACT Aero Soln inhalation aerosol Inhale 2 Puffs by mouth every 6 hours as needed for Shortness of Breath. 8.5 g 0   • LORATADINE Take 10 mg by mouth 1 time daily as needed.     • Multiple Vitamins-Minerals (MULTIVITAL PO) Take  by mouth.       No current facility-administered medications for this visit.      She  has a past medical history of Allergy, Anxiety, ASTHMA (9/3/2010), Depression (2008), Diabetes mellitus type II (9/3/2010),  "Elevated liver enzymes (2009), GERD (gastroesophageal reflux disease), Hyperlipidemia, Urinary tract infection, site not specified, and Vitamin D deficiency (2009). She also has no past medical history of Addisons disease (HCC), Adrenal disorder (HCC), Anemia, Arrhythmia, Arthritis, Blood transfusion, CATARACT, CHF (congestive heart failure) (HCC), Clotting disorder (HCC), COPD, Cushings syndrome (HCC), EMPHYSEMA, Glaucoma, Goiter, Headache(784.0), Heart attack (HCC), Heart murmur, Hypertension, IBD (inflammatory bowel disease), Kidney disease, Meningitis, Migraine, Muscle disorder, OSTEOPOROSIS, Parathyroid disorder (HCC), Pituitary disease (HCC), Seizure (HCC), Stroke (HCC), Substance abuse (HCC), Thyroid disease, Tuberculosis, or Ulcer.    ROS included above     Objective:     /74 (BP Location: Left arm, Patient Position: Sitting, BP Cuff Size: Adult)   Pulse 81   Temp 37.2 °C (99 °F)   Resp 16   Ht 1.575 m (5' 2.01\")   Wt 68.9 kg (151 lb 14.4 oz)   SpO2 100%  Body mass index is 27.78 kg/m².     Physical Exam:  General: Alert, oriented in no acute distress.  Eye contact is good, speech is normal, affect calm  Lungs: clear to auscultation bilaterally, normal effort, no wheeze/ rhonchi/ rales.  CV: regular rate and rhythm, S1, S2, no murmur  Abdomen: soft, nontender  Ext: no edema, color normal, vascularity normal, temperature normal    Assessment and Plan:   The following treatment plan was discussed   1. Diabetes mellitus type 2, uncontrolled, with complications ([facility])   A1c is still very slightly above goal at 7.1.  Diet and exercise recommendations reviewed.  She is motivated and would like to be proactive about her diabetes management.  We will have her add a low-dose of an ACE inhibitor, repeat labs in 6 months  lisinopril (PRINIVIL) 5 MG Tab    Comp Metabolic Panel    HEMOGLOBIN A1C    MICROALBUMIN CREAT RATIO URINE   2. Breast cancer screening  MA-SCREENING MAMMO BILAT W/TOMOSYNTHESIS W/CAD   3. " DM type 2 with diabetic dyslipidemia (HCC)   lipid panel reviewed, will start atorvastatin, continue to monitor liver function  atorvastatin (LIPITOR) 10 MG Tab    Lipid Profile    Comp Metabolic Panel   4. Dyslipidemia associated with type 2 diabetes mellitus (HCC)   as above       Followup: 6 months with labs prior         Please note that this dictation was created using voice recognition software. I have worked with consultants from the vendor as well as technical experts from [facility] to optimize the interface. I have made every reasonable attempt to correct obvious errors, but I expect that there are errors of grammar and possibly content that I did not discover before finalizing the note.

## 2020-04-17 NOTE — ASSESSMENT & PLAN NOTE
Recent lipid panel reviewed, she is agreeable to trial of statin and understands this will reduce her risk for long-term complications.  She does have history of elevated liver enzymes but most recent liver function tests in normal range.

## 2020-04-17 NOTE — ASSESSMENT & PLAN NOTE
A1C remained stable at 7.1.  She admits that she has not been very good with her exercise regimen lately.  She is consistently taking metformin 1000 mg twice daily  No polyuria, polydipsia, numbness or tingling in extremities, blurred vision.  No chronic kidney disease

## 2020-06-05 ENCOUNTER — HOSPITAL ENCOUNTER (OUTPATIENT)
Dept: RADIOLOGY | Facility: MEDICAL CENTER | Age: 45
End: 2020-06-05
Attending: NURSE PRACTITIONER
Payer: COMMERCIAL

## 2020-06-05 DIAGNOSIS — Z12.39 BREAST CANCER SCREENING: ICD-10-CM

## 2020-06-05 PROCEDURE — 77067 SCR MAMMO BI INCL CAD: CPT

## 2020-06-19 DIAGNOSIS — E11.9 NEWLY DIAGNOSED DIABETES (HCC): ICD-10-CM

## 2020-06-19 DIAGNOSIS — E55.9 VITAMIN D DEFICIENCY: ICD-10-CM

## 2020-06-19 RX ORDER — ERGOCALCIFEROL 1.25 MG/1
CAPSULE ORAL
Qty: 12 CAP | Refills: 1 | Status: SHIPPED | OUTPATIENT
Start: 2020-06-19 | End: 2021-06-04

## 2020-06-19 RX ORDER — METFORMIN HYDROCHLORIDE 500 MG/1
TABLET, EXTENDED RELEASE ORAL
Qty: 360 TAB | Refills: 1 | Status: SHIPPED | OUTPATIENT
Start: 2020-06-19 | End: 2021-02-19

## 2020-06-19 NOTE — TELEPHONE ENCOUNTER
Received request via: Pharmacy    Was the patient seen in the last year in this department? Yes 4/17/2020    Does the patient have an active prescription (recently filled or refills available) for medication(s) requested? No

## 2020-10-05 ENCOUNTER — TELEPHONE (OUTPATIENT)
Dept: MEDICAL GROUP | Facility: MEDICAL CENTER | Age: 45
End: 2020-10-05

## 2020-10-05 NOTE — TELEPHONE ENCOUNTER
Pt has lab orders in epic, please notify. Insurance will not cover test for blood type. She should donate blood and they can tell her. Minal SALAZAR

## 2020-10-05 NOTE — TELEPHONE ENCOUNTER
----- Message from Sybil Manuel sent at 10/3/2020 10:31 AM PDT -----  Regarding: Non-Urgent Medical Question  Contact: [phone]  Good morning Esmer,   Can you order my lab work to check my A1C, cholesterol, and my vitamin d. I will also like to know what blood type do i have, can you order that too please.   Thank you!

## 2020-10-16 ENCOUNTER — IMMUNIZATION (OUTPATIENT)
Dept: SOCIAL WORK | Facility: CLINIC | Age: 45
End: 2020-10-16
Payer: COMMERCIAL

## 2020-10-16 DIAGNOSIS — Z23 NEED FOR VACCINATION: Primary | ICD-10-CM

## 2020-10-16 PROCEDURE — 90686 IIV4 VACC NO PRSV 0.5 ML IM: CPT | Performed by: REGISTERED NURSE

## 2020-10-16 PROCEDURE — 90471 IMMUNIZATION ADMIN: CPT | Performed by: REGISTERED NURSE

## 2020-10-27 ENCOUNTER — PATIENT MESSAGE (OUTPATIENT)
Dept: MEDICAL GROUP | Facility: MEDICAL CENTER | Age: 45
End: 2020-10-27

## 2020-10-27 DIAGNOSIS — Z11.59 SCREENING FOR VIRAL DISEASE: ICD-10-CM

## 2020-10-28 ENCOUNTER — HOSPITAL ENCOUNTER (OUTPATIENT)
Dept: LAB | Facility: MEDICAL CENTER | Age: 45
End: 2020-10-28
Attending: NURSE PRACTITIONER
Payer: COMMERCIAL

## 2020-10-28 DIAGNOSIS — Z11.59 SCREENING FOR VIRAL DISEASE: ICD-10-CM

## 2020-10-28 LAB — COVID ORDER STATUS COVID19: NORMAL

## 2020-10-28 PROCEDURE — C9803 HOPD COVID-19 SPEC COLLECT: HCPCS

## 2020-10-28 PROCEDURE — U0003 INFECTIOUS AGENT DETECTION BY NUCLEIC ACID (DNA OR RNA); SEVERE ACUTE RESPIRATORY SYNDROME CORONAVIRUS 2 (SARS-COV-2) (CORONAVIRUS DISEASE [COVID-19]), AMPLIFIED PROBE TECHNIQUE, MAKING USE OF HIGH THROUGHPUT TECHNOLOGIES AS DESCRIBED BY CMS-2020-01-R: HCPCS

## 2020-10-29 LAB
SARS-COV-2 RNA RESP QL NAA+PROBE: NOTDETECTED
SPECIMEN SOURCE: NORMAL

## 2020-11-02 ENCOUNTER — HOSPITAL ENCOUNTER (OUTPATIENT)
Dept: LAB | Facility: MEDICAL CENTER | Age: 45
End: 2020-11-02
Attending: NURSE PRACTITIONER
Payer: COMMERCIAL

## 2020-11-02 DIAGNOSIS — E78.5 DM TYPE 2 WITH DIABETIC DYSLIPIDEMIA (HCC): ICD-10-CM

## 2020-11-02 DIAGNOSIS — E11.69 DM TYPE 2 WITH DIABETIC DYSLIPIDEMIA (HCC): ICD-10-CM

## 2020-11-02 LAB
ALBUMIN SERPL BCP-MCNC: 4.4 G/DL (ref 3.2–4.9)
ALBUMIN/GLOB SERPL: 1.2 G/DL
ALP SERPL-CCNC: 88 U/L (ref 30–99)
ALT SERPL-CCNC: 62 U/L (ref 2–50)
ANION GAP SERPL CALC-SCNC: 12 MMOL/L (ref 7–16)
AST SERPL-CCNC: 44 U/L (ref 12–45)
BILIRUB SERPL-MCNC: 0.2 MG/DL (ref 0.1–1.5)
BUN SERPL-MCNC: 9 MG/DL (ref 8–22)
CALCIUM SERPL-MCNC: 9.6 MG/DL (ref 8.5–10.5)
CHLORIDE SERPL-SCNC: 99 MMOL/L (ref 96–112)
CHOLEST SERPL-MCNC: 209 MG/DL (ref 100–199)
CO2 SERPL-SCNC: 25 MMOL/L (ref 20–33)
CREAT SERPL-MCNC: 0.62 MG/DL (ref 0.5–1.4)
CREAT UR-MCNC: 93.77 MG/DL
EST. AVERAGE GLUCOSE BLD GHB EST-MCNC: 171 MG/DL
FASTING STATUS PATIENT QL REPORTED: NORMAL
GLOBULIN SER CALC-MCNC: 3.6 G/DL (ref 1.9–3.5)
GLUCOSE SERPL-MCNC: 147 MG/DL (ref 65–99)
HBA1C MFR BLD: 7.6 % (ref 0–5.6)
HDLC SERPL-MCNC: 34 MG/DL
LDLC SERPL CALC-MCNC: 127 MG/DL
MICROALBUMIN UR-MCNC: <1.2 MG/DL
MICROALBUMIN/CREAT UR: NORMAL MG/G (ref 0–30)
POTASSIUM SERPL-SCNC: 5.1 MMOL/L (ref 3.6–5.5)
PROT SERPL-MCNC: 8 G/DL (ref 6–8.2)
SODIUM SERPL-SCNC: 136 MMOL/L (ref 135–145)
TRIGL SERPL-MCNC: 239 MG/DL (ref 0–149)

## 2020-11-02 PROCEDURE — 82570 ASSAY OF URINE CREATININE: CPT

## 2020-11-02 PROCEDURE — 80053 COMPREHEN METABOLIC PANEL: CPT

## 2020-11-02 PROCEDURE — 36415 COLL VENOUS BLD VENIPUNCTURE: CPT

## 2020-11-02 PROCEDURE — 83036 HEMOGLOBIN GLYCOSYLATED A1C: CPT

## 2020-11-02 PROCEDURE — 82043 UR ALBUMIN QUANTITATIVE: CPT

## 2020-11-02 PROCEDURE — 80061 LIPID PANEL: CPT

## 2020-11-15 ENCOUNTER — PATIENT MESSAGE (OUTPATIENT)
Dept: MEDICAL GROUP | Facility: MEDICAL CENTER | Age: 45
End: 2020-11-15

## 2020-11-16 RX ORDER — ATORVASTATIN CALCIUM 20 MG/1
20 TABLET, FILM COATED ORAL DAILY
Qty: 90 TAB | Refills: 1 | Status: SHIPPED | OUTPATIENT
Start: 2020-11-16 | End: 2022-03-25 | Stop reason: SDUPTHER

## 2020-11-16 NOTE — TELEPHONE ENCOUNTER
From: Sybil Manuel  To: DAVIDA Bro  Sent: 11/15/2020 1:58 PM PST  Subject: Test Result Question    Jeff Lyman, thank you for letting me know about my test results. You can please send my new prescriptions to [facility] on [address] in [place]. Can you please send a 90 day supply. Thank you!

## 2021-01-01 ENCOUNTER — PATIENT MESSAGE (OUTPATIENT)
Dept: MEDICAL GROUP | Facility: MEDICAL CENTER | Age: 46
End: 2021-01-01

## 2021-01-01 DIAGNOSIS — B34.9 VIRAL ILLNESS: ICD-10-CM

## 2021-01-02 NOTE — TELEPHONE ENCOUNTER
From: Sybil Manuel  To: DAVIDA Bro  Sent: 1/1/2021 4:12 PM PST  Subject: Non-Urgent Medical Question    Jeff Lyman,  I haven't been feeling well for the last 3 days. I feel fatigue,have body ache,sore throat, headache and cough. Do you recommend a COVID test?

## 2021-01-05 ENCOUNTER — HOSPITAL ENCOUNTER (OUTPATIENT)
Dept: LAB | Facility: MEDICAL CENTER | Age: 46
End: 2021-01-05
Attending: NURSE PRACTITIONER
Payer: COMMERCIAL

## 2021-01-05 DIAGNOSIS — B34.9 VIRAL ILLNESS: ICD-10-CM

## 2021-01-05 PROCEDURE — U0003 INFECTIOUS AGENT DETECTION BY NUCLEIC ACID (DNA OR RNA); SEVERE ACUTE RESPIRATORY SYNDROME CORONAVIRUS 2 (SARS-COV-2) (CORONAVIRUS DISEASE [COVID-19]), AMPLIFIED PROBE TECHNIQUE, MAKING USE OF HIGH THROUGHPUT TECHNOLOGIES AS DESCRIBED BY CMS-2020-01-R: HCPCS

## 2021-01-05 PROCEDURE — C9803 HOPD COVID-19 SPEC COLLECT: HCPCS

## 2021-01-05 PROCEDURE — U0005 INFEC AGEN DETEC AMPLI PROBE: HCPCS

## 2021-01-06 LAB
COVID ORDER STATUS COVID19: NORMAL
SARS-COV-2 RNA RESP QL NAA+PROBE: NOTDETECTED
SPECIMEN SOURCE: NORMAL

## 2021-01-07 ENCOUNTER — PATIENT MESSAGE (OUTPATIENT)
Dept: MEDICAL GROUP | Facility: MEDICAL CENTER | Age: 46
End: 2021-01-07

## 2021-01-08 ENCOUNTER — PATIENT MESSAGE (OUTPATIENT)
Dept: MEDICAL GROUP | Facility: MEDICAL CENTER | Age: 46
End: 2021-01-08

## 2021-01-08 DIAGNOSIS — R06.2 WHEEZING: ICD-10-CM

## 2021-01-08 RX ORDER — ALBUTEROL SULFATE 90 UG/1
2 AEROSOL, METERED RESPIRATORY (INHALATION) EVERY 6 HOURS PRN
Qty: 8.5 G | Refills: 0 | Status: SHIPPED | OUTPATIENT
Start: 2021-01-08 | End: 2021-07-30

## 2021-01-08 NOTE — TELEPHONE ENCOUNTER
From: Sybil Manuel  To: DAVIDA Bro  Sent: 1/8/2021 1:32 PM PST  Subject: Non-Urgent Medical Question    Jeff Lyman, my symptoms started last week friday night and I don't have any fever      ----- Message -----   From:DAVIDA Bro   Sent:1/8/2021 8:50 AM PST   To:Sybil Manuel   Subject:RE: Non-Urgent Medical Question    How many days have you had symptoms, Sybil?  Have you had any fevers recently?      ----- Message -----   From:Sybil Manuel   Sent:1/7/2021 5:29 PM PST   To:DAVIDA Bro   Subject:Non-Urgent Medical Question    Jeff Lyman, I'm glad I don't have COVID. I have been taking over the counter cold and flu Theraflu and used up my old albuterol inhaler. I steel feel fatigue,joint pain,headache, cough & wheezing. What would you recommend?

## 2021-02-19 DIAGNOSIS — E11.9 NEWLY DIAGNOSED DIABETES (HCC): ICD-10-CM

## 2021-02-20 RX ORDER — METFORMIN HYDROCHLORIDE 500 MG/1
TABLET, EXTENDED RELEASE ORAL
Qty: 360 TABLET | Refills: 0 | Status: SHIPPED | OUTPATIENT
Start: 2021-02-20 | End: 2021-03-16

## 2021-02-20 RX ORDER — LISINOPRIL 5 MG/1
TABLET ORAL
Qty: 90 TABLET | Refills: 0 | Status: SHIPPED | OUTPATIENT
Start: 2021-02-20 | End: 2021-04-16

## 2021-03-16 ENCOUNTER — PATIENT MESSAGE (OUTPATIENT)
Dept: MEDICAL GROUP | Facility: MEDICAL CENTER | Age: 46
End: 2021-03-16

## 2021-03-16 DIAGNOSIS — E78.5 DYSLIPIDEMIA ASSOCIATED WITH TYPE 2 DIABETES MELLITUS (HCC): ICD-10-CM

## 2021-03-16 DIAGNOSIS — E11.69 DYSLIPIDEMIA ASSOCIATED WITH TYPE 2 DIABETES MELLITUS (HCC): ICD-10-CM

## 2021-03-16 DIAGNOSIS — R74.8 ELEVATED LIVER ENZYMES: ICD-10-CM

## 2021-03-19 NOTE — TELEPHONE ENCOUNTER
From: Sybil Manuel  To: Nurse Practicioner Esmer Merritt  Sent: 3/18/2021 3:59 PM PDT  Subject: RE:appointment and labs    Afternoon please      ----- Message -----   From:Nurse Practicioner Esmer Merritt   Sent:3/18/2021 3:38 PM PDT   To:Sybil Manuel   Subject:RE:appointment and labs    Sure. Do you prefer morning or afternoon?      ----- Message -----   From:Sybil Manuel   Sent:3/18/2021 3:22 PM PDT   To:Nurse Practicioner Esmer Merritt   Subject:RE:appointment and labs    Jeff Lyman, I'm sorry I didn't heard the phone. Thank you for reaching out, do you have any appointments available on a Friday?  Thank you      ----- Message -----   From:Nurse Practicioner Esmer Merritt   Sent:3/16/2021 1:48 PM PDT   To:Sybil Manuel   Subject:appointment and labs    Jeff Devine,  I tried to call you this afternoon, sorry I was not able to connect with you. I would like you to schedule an appointment to follow-up on your diabetes. I have also ordered lab work that you may complete a few days prior.  Talk to you soon,  Minal SALAZAR

## 2021-04-08 ENCOUNTER — HOSPITAL ENCOUNTER (OUTPATIENT)
Dept: LAB | Facility: MEDICAL CENTER | Age: 46
End: 2021-04-08
Attending: NURSE PRACTITIONER
Payer: COMMERCIAL

## 2021-04-08 DIAGNOSIS — R74.8 ELEVATED LIVER ENZYMES: ICD-10-CM

## 2021-04-08 DIAGNOSIS — E11.69 DYSLIPIDEMIA ASSOCIATED WITH TYPE 2 DIABETES MELLITUS (HCC): ICD-10-CM

## 2021-04-08 DIAGNOSIS — E78.5 DYSLIPIDEMIA ASSOCIATED WITH TYPE 2 DIABETES MELLITUS (HCC): ICD-10-CM

## 2021-04-08 LAB
ALBUMIN SERPL BCP-MCNC: 4.2 G/DL (ref 3.2–4.9)
ALBUMIN/GLOB SERPL: 1.3 G/DL
ALP SERPL-CCNC: 69 U/L (ref 30–99)
ALT SERPL-CCNC: 52 U/L (ref 2–50)
ANION GAP SERPL CALC-SCNC: 9 MMOL/L (ref 7–16)
AST SERPL-CCNC: 36 U/L (ref 12–45)
BILIRUB SERPL-MCNC: 0.3 MG/DL (ref 0.1–1.5)
BUN SERPL-MCNC: 15 MG/DL (ref 8–22)
CALCIUM SERPL-MCNC: 9.3 MG/DL (ref 8.5–10.5)
CHLORIDE SERPL-SCNC: 100 MMOL/L (ref 96–112)
CHOLEST SERPL-MCNC: 210 MG/DL (ref 100–199)
CO2 SERPL-SCNC: 25 MMOL/L (ref 20–33)
CREAT SERPL-MCNC: 0.56 MG/DL (ref 0.5–1.4)
CREAT UR-MCNC: 99.7 MG/DL
FASTING STATUS PATIENT QL REPORTED: NORMAL
GLOBULIN SER CALC-MCNC: 3.2 G/DL (ref 1.9–3.5)
GLUCOSE SERPL-MCNC: 118 MG/DL (ref 65–99)
HDLC SERPL-MCNC: 45 MG/DL
LDLC SERPL CALC-MCNC: 138 MG/DL
MICROALBUMIN UR-MCNC: <1.2 MG/DL
MICROALBUMIN/CREAT UR: NORMAL MG/G (ref 0–30)
POTASSIUM SERPL-SCNC: 4.2 MMOL/L (ref 3.6–5.5)
PROT SERPL-MCNC: 7.4 G/DL (ref 6–8.2)
SODIUM SERPL-SCNC: 134 MMOL/L (ref 135–145)
TRIGL SERPL-MCNC: 135 MG/DL (ref 0–149)

## 2021-04-08 PROCEDURE — 80061 LIPID PANEL: CPT

## 2021-04-08 PROCEDURE — 80053 COMPREHEN METABOLIC PANEL: CPT

## 2021-04-08 PROCEDURE — 83036 HEMOGLOBIN GLYCOSYLATED A1C: CPT

## 2021-04-08 PROCEDURE — 82570 ASSAY OF URINE CREATININE: CPT

## 2021-04-08 PROCEDURE — 36415 COLL VENOUS BLD VENIPUNCTURE: CPT

## 2021-04-08 PROCEDURE — 82043 UR ALBUMIN QUANTITATIVE: CPT

## 2021-04-09 ENCOUNTER — APPOINTMENT (OUTPATIENT)
Dept: MEDICAL GROUP | Facility: MEDICAL CENTER | Age: 46
End: 2021-04-09
Payer: COMMERCIAL

## 2021-04-09 LAB
EST. AVERAGE GLUCOSE BLD GHB EST-MCNC: 189 MG/DL
HBA1C MFR BLD: 8.2 % (ref 4–5.6)

## 2021-04-16 ENCOUNTER — OFFICE VISIT (OUTPATIENT)
Dept: MEDICAL GROUP | Facility: MEDICAL CENTER | Age: 46
End: 2021-04-16
Payer: COMMERCIAL

## 2021-04-16 VITALS
RESPIRATION RATE: 20 BRPM | OXYGEN SATURATION: 99 % | DIASTOLIC BLOOD PRESSURE: 80 MMHG | SYSTOLIC BLOOD PRESSURE: 124 MMHG | BODY MASS INDEX: 27.59 KG/M2 | HEIGHT: 62 IN | HEART RATE: 78 BPM | WEIGHT: 149.91 LBS | TEMPERATURE: 97.6 F

## 2021-04-16 DIAGNOSIS — Z12.31 ENCOUNTER FOR SCREENING MAMMOGRAM FOR MALIGNANT NEOPLASM OF BREAST: ICD-10-CM

## 2021-04-16 DIAGNOSIS — E78.5 DYSLIPIDEMIA ASSOCIATED WITH TYPE 2 DIABETES MELLITUS (HCC): ICD-10-CM

## 2021-04-16 DIAGNOSIS — R00.2 PALPITATIONS: ICD-10-CM

## 2021-04-16 DIAGNOSIS — E11.69 DYSLIPIDEMIA ASSOCIATED WITH TYPE 2 DIABETES MELLITUS (HCC): ICD-10-CM

## 2021-04-16 PROCEDURE — 99213 OFFICE O/P EST LOW 20 MIN: CPT | Performed by: NURSE PRACTITIONER

## 2021-04-16 ASSESSMENT — PATIENT HEALTH QUESTIONNAIRE - PHQ9: CLINICAL INTERPRETATION OF PHQ2 SCORE: 0

## 2021-04-16 NOTE — ASSESSMENT & PLAN NOTE
This is a new problem over the last few months.  She reports intermittent episodes of palpitation with no identifiable trigger, sometimes prolonged and associated with shortness of breath or dizziness.  She had an episode recently where she had to lay down for a while before feeling better.  She is not noted any association with her blood sugar, she did check her blood sugar last time this occurred and it was at 110.  She reports minimal caffeine intake, has been good about her hydration.  She has not had any associated pain or presyncopal sensation, no activity intolerance.  No history of arrhythmia or electrolyte derangement

## 2021-04-16 NOTE — ASSESSMENT & PLAN NOTE
Chronic issue uncontrolled with a1c 8.2.  She is uncertain why her A1c would have gone up and states that she has been doing much better with her diet as well as exercise, she is kept her weight and check.  She has been consistent with Janumet twice daily and is tolerating this without difficulty  No polyuria, polydipsia, numbness or tingling in extremities.  No recent vision changes

## 2021-04-16 NOTE — ASSESSMENT & PLAN NOTE
Chronic issue, uncontrolled.  Prescribed atorvastatin 20 mg daily but admits she has not been taking this consistently

## 2021-04-16 NOTE — PROGRESS NOTES
Subjective:     Chief Complaint   Patient presents with   • Follow-Up     labs, DM     Sybil Manuel is a 45 y.o. female here today to follow up on:    Diabetes mellitus type 2, uncontrolled, with complications (HCC)  Chronic issue uncontrolled with a1c 8.2.  She is uncertain why her A1c would have gone up and states that she has been doing much better with her diet as well as exercise, she is kept her weight and check.  She has been consistent with Janumet twice daily and is tolerating this without difficulty  No polyuria, polydipsia, numbness or tingling in extremities.  No recent vision changes    Dyslipidemia associated with type 2 diabetes mellitus (HCC)  Chronic issue, uncontrolled.  Prescribed atorvastatin 20 mg daily but admits she has not been taking this consistently    Palpitations  This is a new problem over the last few months.  She reports intermittent episodes of palpitation with no identifiable trigger, sometimes prolonged and associated with shortness of breath or dizziness.  She had an episode recently where she had to lay down for a while before feeling better.  She is not noted any association with her blood sugar, she did check her blood sugar last time this occurred and it was at 110.  She reports minimal caffeine intake, has been good about her hydration.  She has not had any associated pain or presyncopal sensation, no activity intolerance.  No history of arrhythmia or electrolyte derangement       Current medicines (including changes today)  Current Outpatient Medications   Medication Sig Dispense Refill   • albuterol 108 (90 Base) MCG/ACT Aero Soln inhalation aerosol Inhale 2 Puffs every 6 hours as needed for Shortness of Breath. 8.5 g 0   • SitaGLIPtin-MetFORMIN HCl  MG TABLET SR 24 HR Take 1 Tab by mouth 2 Times a Day. 180 Tab 1   • atorvastatin (LIPITOR) 20 MG Tab Take 1 Tab by mouth every day. 90 Tab 1   • vitamin D, Ergocalciferol, (DRISDOL) 1.25 MG (09719 UT) Cap capsule Take  "1 capsule by mouth once a week 12 Cap 1   • albuterol (PROVENTIL) 2.5mg/3ml Nebu Soln solution for nebulization 3 mL by Nebulization route every four hours as needed for Shortness of Breath. 30 Bullet 0   • LORATADINE Take 10 mg by mouth 1 time daily as needed.     • Multiple Vitamins-Minerals (MULTIVITAL PO) Take  by mouth.       No current facility-administered medications for this visit.     She  has a past medical history of Allergy, Anxiety, ASTHMA (9/3/2010), Depression (2008), Diabetes mellitus type II (9/3/2010), Elevated liver enzymes (2009), GERD (gastroesophageal reflux disease), Hyperlipidemia, Urinary tract infection, site not specified, and Vitamin D deficiency (2009). She also has no past medical history of Addisons disease ([facility]), Adrenal disorder ([facility]), Anemia, Arrhythmia, Arthritis, Blood transfusion, CATARACT, CHF (congestive heart failure) ([facility]), Clotting disorder ([facility]), COPD, Cushings syndrome ([facility]), EMPHYSEMA, Glaucoma, Goiter, Headache(784.0), Heart attack ([facility]), Heart murmur, Hypertension, IBD (inflammatory bowel disease), Kidney disease, Meningitis, Migraine, Muscle disorder, OSTEOPOROSIS, Parathyroid disorder (HCC), Pituitary disease (HCC), Seizure ([facility]), Stroke ([facility]), Substance abuse (HCC), Thyroid disease, Tuberculosis, or Ulcer.    ROS included above     Objective:     /80 (BP Location: Left arm, Patient Position: Sitting, BP Cuff Size: Adult)   Pulse 78   Temp 36.4 °C (97.6 °F) (Temporal)   Resp 20   Ht 1.575 m (5' 2\")   Wt 68 kg (149 lb 14.6 oz)   SpO2 99%  Body mass index is 27.42 kg/m².     Physical Exam:  General: Alert, oriented in no acute distress.  Eye contact is good, speech is normal, affect calm  Lungs: clear to auscultation bilaterally, normal effort, no wheeze/ rhonchi/ rales.  CV: regular rate and rhythm, S1, S2, no murmur  Abdomen: soft, nontender  Ext: no edema, color normal, vascularity normal, temperature normal    Assessment and Plan:   The following " treatment plan was discussed   1. Diabetes mellitus type 2, uncontrolled, with complications (HCC)   A1c is increased to 8.2 despite medication compliance and diet and exercise efforts.  We will evaluate further labs as listed obtain consult with endocrinology.  May need further medication adjustment pending lab results.  REFERRAL TO [facility] IMPROVEMENT PROGRAMS (HIP)    REFERRAL TO ENDOCRINOLOGY    GAD65 AND INSULIN AB W/RFLX TO IA-2 AB    C Peptide, Ultrasensitive, ES    21-HYDROXYLASE ANTIBODIES   2. Dyslipidemia associated with type 2 diabetes mellitus (HCC)   uncontrolled.  Advised to take atorvastatin consistently    3. Palpitations   intermittent episodes of palpitation over the last few months with the most recent episode prolonged and associated with lightheadedness and fatigue.  She has not had any chest pain or activity intolerance, has not noted any particular triggers.  No symptoms at this time.  We will evaluate Holter study.  Discussed hydration, caffeine avoidance.  ER precautions reviewed  [facility] Patch Monitor    TSH WITH REFLEX TO FT4    CBC WITH DIFFERENTIAL   4. Encounter for screening mammogram for malignant neoplasm of breast  MA-SCREENING MAMMO BILAT W/TOMOSYNTHESIS W/CAD       Followup: Pending tests         Please note that this dictation was created using voice recognition software. I have worked with consultants from the vendor as well as technical experts from [employer] [facility] to optimize the interface. I have made every reasonable attempt to correct obvious errors, but I expect that there are errors of grammar and possibly content that I did not discover before finalizing the note.

## 2021-05-26 ENCOUNTER — OFFICE VISIT (OUTPATIENT)
Dept: HEALTH INFORMATION MANAGEMENT | Facility: MEDICAL CENTER | Age: 46
End: 2021-05-26
Payer: COMMERCIAL

## 2021-05-26 VITALS — HEIGHT: 62 IN | BODY MASS INDEX: 26.87 KG/M2 | WEIGHT: 146 LBS

## 2021-05-26 DIAGNOSIS — E11.69 DYSLIPIDEMIA ASSOCIATED WITH TYPE 2 DIABETES MELLITUS (HCC): ICD-10-CM

## 2021-05-26 DIAGNOSIS — E78.5 DYSLIPIDEMIA ASSOCIATED WITH TYPE 2 DIABETES MELLITUS (HCC): ICD-10-CM

## 2021-05-26 PROCEDURE — 97802 MEDICAL NUTRITION INDIV IN: CPT | Performed by: DIETITIAN, REGISTERED

## 2021-05-27 NOTE — PROGRESS NOTES
"5/26/2021    DAVIDA Bro  46 y.o.   Time in/out: 10:40-11:58 am     Anthropometrics/Objective  Vitals:    05/26/21 1702   Weight: 66.2 kg (146 lb)   Height: 1.575 m (5' 2\")       Body mass index is 26.7 kg/m².    Stated Goal Weight: 140 lb  Estimated Caloric needs 1353 kcals/day ([place] equation, no activity factor)    See comprehensive patient history form for further information     Subjective:  -Pt referred for Type 2 Diabetes with desire to learn what to eat to lower her HgbA1c and cholesterol as well as what type of exercise to do. She is currently taking Janumet 50-1000mg twice daily and admits to infrequent blood glucose monitoring with time during work as a barrier to checking. Her recent A1c if up from 7.6 to 8.2% despite reported dietary changes including a reduction/cessation in her sugar intake. She reports currently walking the dogs 2x per week for 45 minutes.     Diet Recall:  B- Premier protein shake or oatmeal with flax and berries  S-veggie chips or 1 fruit (apple, banana, strawberries)  L- [place] (meatball soup), rice, veggies + fruit, jello, or chips  S- 0   D- salmon, rice, stir posey veggie (mostly chicken and fish for protein), quesadilla when busy  Beverages: sparkling water, diet coke, unswt iced tea     Nutrition Diagnosis (PES Statement)  · Altered nutrition related lab values related to endocrine dysfunction as evidenced by HgbA1c of 8.2%    Client history:  Condition(s) associated with a diagnosis or treatment (specify) Type 2 Diabetes, dyslipidemia, elevated liver enzymes     Biochemical data, medical test and procedures  Lab Results   Component Value Date/Time    HBA1C 8.2 (H) 04/08/2021 06:31 AM   @  No results found for: POCGLUCOSE  Lab Results   Component Value Date/Time    CHOLSTRLTOT 210 (H) 04/08/2021 06:31 AM     (H) 04/08/2021 06:31 AM    HDL 45 04/08/2021 06:31 AM    TRIGLYCERIDE 135 04/08/2021 06:31 AM         Nutrition Intervention  Nutrition " Prescription  Carb choices/grams: 30-45g per meal; 15g per snack     Meal and Snack  Recommend a general/healthful diet, carb controlled     Comprehensive Nutrition education Instruction or training leading to in-depth nutrition related knowledge about:  Combine carb, protein and fat at each meal, Eating out, Meal timing and spacing, Metabolism of carb, protein, fat, Physical activity/exercise, Portion control, Sweets and alcohol in moderation, Heart-healthy guidelines, Label Reading.  Handouts provided regarding topics discussed:   -Plate Planner   -Nutrition Basics   -Carb Serving List     Monitoring & Evaluation Plan    Behavioral-Environmental:  Food/Nutrition knowledge: Read food labels to identify total carb content of foods   Physical activity: Increase as tolerated, increase amount and intensity gradually over time. Aim for least 150 minutes of moderate aerobic activity and muscle-strength activities (resistance or weights) on at least 2 days per week.     Food / Nutrient Intake:  Food intake: Use the plate method recommendations for portions/balance at meals  Fluid/Beverage intake: Avoid all sweetened beverages unless utilizing to treat for low blood sugar   Macronutrient intake: Up to 30-45 grams CHO with meals for women and up to 15 grams CHO with snacks (other options are non=starchy veggies or protein between meals for foods that will not raise blood glucose)     Physical Signs / Symptoms:  HbA1c to goal per MD   Weight change 1-2 lb per week to goal     Assessment Notes:  Provided pt with diet education regarding glycemic management. Discussed nutrition basics, how different foods affect blood glucose, benefits of checking blood sugar regularly and consideration for a CGM if available to pt, portion control, counting carbohydrates, label reading, and exercise for managing blood sugar.    The pt is doing well with her diet and has made improvements in reducing her sugar intake. Suggested addition of  protein with all meals and snacks and further monitoring total carb intake now that she is aware of carb counting. Emphasis was placed on regular and consistent physical activity for optimal blood sugar control. The pt plans to walk at the park before work to meet this goal. Discussed working on lifestyle habits within her control and if A1C remains above target, discussing additional medications or medication changes with her physician.     Follow up: carlos Narayan, MS, RD, LD  [facility]  [phone]

## 2021-06-01 RX ORDER — SITAGLIPTIN AND METFORMIN HYDROCHLORIDE 1000; 50 MG/1; MG/1
TABLET, FILM COATED, EXTENDED RELEASE ORAL
Qty: 180 TABLET | Refills: 1 | Status: SHIPPED | OUTPATIENT
Start: 2021-06-01 | End: 2021-09-24

## 2021-06-01 NOTE — TELEPHONE ENCOUNTER
Received request via: Pharmacy    Was the patient seen in the last year in this department? Yes    Does the patient have an active prescription (recently filled or refills available) for medication(s) requested? No     Requested Prescriptions     Pending Prescriptions Disp Refills   • JANUMET XR  MG TABLET SR 24 HR [Pharmacy Med Name: Janumet XR  MG Oral Tablet Extended Release 24 Hour] 180 tablet 0     Sig: Take 1 tablet by mouth twice daily

## 2021-06-04 ENCOUNTER — NON-PROVIDER VISIT (OUTPATIENT)
Dept: CARDIOLOGY | Facility: MEDICAL CENTER | Age: 46
End: 2021-06-04
Attending: NURSE PRACTITIONER
Payer: COMMERCIAL

## 2021-06-04 DIAGNOSIS — I49.3 PVC (PREMATURE VENTRICULAR CONTRACTION): ICD-10-CM

## 2021-06-04 DIAGNOSIS — R00.2 PALPITATIONS: ICD-10-CM

## 2021-06-04 DIAGNOSIS — E55.9 VITAMIN D DEFICIENCY: ICD-10-CM

## 2021-06-04 RX ORDER — ERGOCALCIFEROL 1.25 MG/1
CAPSULE ORAL
Qty: 12 CAPSULE | Refills: 0 | Status: SHIPPED | OUTPATIENT
Start: 2021-06-04 | End: 2022-03-25 | Stop reason: SDUPTHER

## 2021-06-08 DIAGNOSIS — R00.2 PALPITATIONS: ICD-10-CM

## 2021-06-08 LAB — EKG IMPRESSION: NORMAL

## 2021-06-08 PROCEDURE — 93224 XTRNL ECG REC UP TO 48 HRS: CPT | Performed by: INTERNAL MEDICINE

## 2021-06-08 NOTE — PROGRESS NOTES
VENKATESH Frost R.N. Hello!   When you have a free moment can you throw in some holter orders?   48 hours for palpitations, going to [facility] as ADD, ordered by Esmer SALAZAR.

## 2021-06-23 ENCOUNTER — PATIENT MESSAGE (OUTPATIENT)
Dept: MEDICAL GROUP | Facility: MEDICAL CENTER | Age: 46
End: 2021-06-23

## 2021-07-09 ENCOUNTER — HOSPITAL ENCOUNTER (OUTPATIENT)
Dept: RADIOLOGY | Facility: MEDICAL CENTER | Age: 46
End: 2021-07-09
Attending: NURSE PRACTITIONER
Payer: COMMERCIAL

## 2021-07-09 DIAGNOSIS — Z12.31 ENCOUNTER FOR SCREENING MAMMOGRAM FOR MALIGNANT NEOPLASM OF BREAST: ICD-10-CM

## 2021-07-09 PROCEDURE — 77063 BREAST TOMOSYNTHESIS BI: CPT

## 2021-07-30 DIAGNOSIS — R06.2 WHEEZING: ICD-10-CM

## 2021-07-30 RX ORDER — ALBUTEROL SULFATE 90 UG/1
AEROSOL, METERED RESPIRATORY (INHALATION)
Qty: 9 G | Refills: 0 | Status: SHIPPED | OUTPATIENT
Start: 2021-07-30 | End: 2021-10-25

## 2021-08-27 ENCOUNTER — PATIENT MESSAGE (OUTPATIENT)
Dept: MEDICAL GROUP | Facility: MEDICAL CENTER | Age: 46
End: 2021-08-27

## 2021-09-24 ENCOUNTER — OFFICE VISIT (OUTPATIENT)
Dept: ENDOCRINOLOGY | Facility: MEDICAL CENTER | Age: 46
End: 2021-09-24
Attending: INTERNAL MEDICINE
Payer: COMMERCIAL

## 2021-09-24 VITALS
HEART RATE: 92 BPM | DIASTOLIC BLOOD PRESSURE: 70 MMHG | WEIGHT: 150 LBS | HEIGHT: 62 IN | OXYGEN SATURATION: 100 % | SYSTOLIC BLOOD PRESSURE: 116 MMHG | BODY MASS INDEX: 27.6 KG/M2

## 2021-09-24 DIAGNOSIS — E78.5 DYSLIPIDEMIA: ICD-10-CM

## 2021-09-24 DIAGNOSIS — Z79.84 LONG TERM (CURRENT) USE OF ORAL HYPOGLYCEMIC DRUGS: ICD-10-CM

## 2021-09-24 DIAGNOSIS — E11.65 TYPE 2 DIABETES MELLITUS WITH HYPERGLYCEMIA, WITHOUT LONG-TERM CURRENT USE OF INSULIN (HCC): ICD-10-CM

## 2021-09-24 DIAGNOSIS — R74.01 ELEVATED SGOT: ICD-10-CM

## 2021-09-24 DIAGNOSIS — E55.9 VITAMIN D DEFICIENCY: ICD-10-CM

## 2021-09-24 LAB
HBA1C MFR BLD: 6.4 % (ref 0–5.6)
INT CON NEG: ABNORMAL
INT CON POS: ABNORMAL

## 2021-09-24 PROCEDURE — 83036 HEMOGLOBIN GLYCOSYLATED A1C: CPT | Performed by: INTERNAL MEDICINE

## 2021-09-24 PROCEDURE — 99204 OFFICE O/P NEW MOD 45 MIN: CPT | Performed by: INTERNAL MEDICINE

## 2021-09-24 PROCEDURE — 99213 OFFICE O/P EST LOW 20 MIN: CPT | Performed by: INTERNAL MEDICINE

## 2021-09-24 RX ORDER — METFORMIN HYDROCHLORIDE 500 MG/1
1000 TABLET, EXTENDED RELEASE ORAL 2 TIMES DAILY
Qty: 360 TABLET | Refills: 3 | Status: SHIPPED | OUTPATIENT
Start: 2021-09-24 | End: 2022-03-25 | Stop reason: SDUPTHER

## 2021-09-24 RX ORDER — SEMAGLUTIDE 1.34 MG/ML
0.5 INJECTION, SOLUTION SUBCUTANEOUS
Qty: 1.5 ML | Refills: 11 | Status: SHIPPED | OUTPATIENT
Start: 2021-09-24 | End: 2022-03-25 | Stop reason: SDUPTHER

## 2021-09-24 NOTE — PROGRESS NOTES
RN-CDE Note    Subjective:   Endocrinology Clinic Progress Note  PCP: DAVIDA Bro    HPI:  Sybil Manuel is a 46 y.o. old patient who is seen today for review of type 2 diabetes.  Recent changes in health: she would like to be checked for anemia.  She feel tired and has very heavy menstruation.    DM:   Last A1c:   Lab Results   Component Value Date/Time    HBA1C 6.4 (A) 09/24/2021 03:05 PM      Previous A1c was 8.2 on 4/8/21  A1C GOAL: < 7    Diabetes Medications:   Janumet XR  mg BID      Exercise: Walking  Diet: Eggs and toast or protein shake for breakfast.  Lunch is her biggest meal and dinner is light.    Patient's body mass index is 27.44 kg/m². Exercise and nutrition counseling were performed at this visit.    Glucose monitoring frequency: Not testing lately    Hypoglycemic episodes: no  Last Retinal Exam: on file and up-to-date  Daily Foot Exam: Yes   Foot Exam:  Monofilament: done  Monofilament testing with a 10 gram force: sensation intact: intact bilaterally  Visual Inspection: Feet without maceration, ulcers, fissures.  Pedal pulses: intact bilaterally   Lab Results   Component Value Date/Time    MALBCRT see below 04/08/2021 06:31 AM    MICROALBUR <1.2 04/08/2021 06:31 AM        She  reports that she has never smoked. She has never used smokeless tobacco.      Plan:     Discussed and educated on:   - All medications, side effects and compliance (discussed carefully)  - Annual eye examinations at Ophthalmology  - Home glucose monitoring emphasized  - Weight control and daily exercise    Recommended medication changes: She would do well with a GLP1 and SGLT 2 Inhibitor.

## 2021-09-24 NOTE — PROGRESS NOTES
"Chief Complaint:  Consult requested by DAVIDA Bro for initial evaluation of Type 2 Diabetes Mellitus    HPI:   Sybil Manuel is a 46 y.o. female with Type 2 Diabetes Mellitus diagnosed in 2019.  She denies hospitalizations for DKA in the past.    Comorbid issues include hyperlipidemia and probable fatty liver disease her liver enzymes have been elevated.  She also has vitamin D deficiency.  She reports a history of urinary tract infections      Her a1c improved from 8.2% to 6.4% on 9/24/2021  She is on Janumet 50/1000mg bid    She doesn't check her blood sugars regularly  She uses a relion meter but she did not bring her glucose meter for appointment today    She denies hypoglycemic episodes.   She  denies hypoglycemic unawareness. She denies episodes of severe hypoglycemia requiring third party assistance.  She  is not wearing a medical alert bracelet or necklace.  She does not a glucagon emergency kit.    She denies attending diabetes education classes.  Diet: \"healthy\" diet  in general.    Diabetes Complications   She  denies history of retinopathy.  She denies laser eye surgery. Last eye exam: She saw Dr. Osorio on 9/2020  She denies history of peripheral sensory neuropathy.  She denies numbness, tingling in both feet.  She denies history of foot sores.   She denies history of kidney damage.  She is not on ACE inhibitor or ARB.   She denies history of coronary artery disease.  She  denies history of stroke and denies TIA.  She denies history of PAD.  She reports history of hyperlipidemia.      ROS:     CONS:     No fever, no chills, no weight loss, no fatigue   EYES:      No diplopia, no blurry vision, no redness of eyes, no swelling of eyelids   ENT:    No hearing loss, No ear pain, No sore throat, no dysphagia, no neck swelling   CV:     No chest pain, no palpitations, no claudication, no orthopnea, no PND   PULM:    No SOB, no cough, no hemoptysis, no wheezing    GI:   No nausea, no vomiting, " no diarrhea, no constipation, no bloody stools   :  Passing urine well, no dysuria, no hematuria   ENDO:   No polyuria, no polydipsia, no heat intolerance, no cold intolerance   NEURO: No headaches, no dizziness, no convulsions, no tremors   MUSC:  No joint swellings, no arthralgias, no myalgias, no weakness   SKIN:   No rash, no ulcers, no dry skin   PSYCH:   No depression, no anxiety, no difficulty sleeping       Past Medical History:  Patient Active Problem List    Diagnosis Date Noted   • Palpitations 04/16/2021   • Lymph node symptom 08/23/2019   • Diabetes mellitus type 2, uncontrolled, with complications (HCC) 07/13/2018   • Elevated liver enzymes 07/13/2018   • Dyslipidemia associated with type 2 diabetes mellitus (HCC) 07/13/2018   • Seasonal allergies 07/15/2016   • Acne 07/21/2011   • Elevated SGOT 09/03/2010   • Vitamin d deficiency 09/03/2010       Past Surgical History:  Past Surgical History:   Procedure Laterality Date   • PRIMARY C SECTION     • TUBAL COAGULATION LAPAROSCOPIC BILATERAL          Allergies:  Patient has no known allergies.     Current Medications:    Current Outpatient Medications:   •  albuterol 108 (90 Base) MCG/ACT Aero Soln inhalation aerosol, INHALE 2 PUFFS BY MOUTH EVERY 6 HOURS AS NEEDED FOR SHORTNESS OF BREATH, Disp: 9 g, Rfl: 0  •  JANUMET XR  MG TABLET SR 24 HR, Take 1 tablet by mouth twice daily, Disp: 180 tablet, Rfl: 1  •  atorvastatin (LIPITOR) 20 MG Tab, Take 1 Tab by mouth every day., Disp: 90 Tab, Rfl: 1  •  albuterol (PROVENTIL) 2.5mg/3ml Nebu Soln solution for nebulization, 3 mL by Nebulization route every four hours as needed for Shortness of Breath., Disp: 30 Bullet, Rfl: 0  •  LORATADINE, Take 10 mg by mouth 1 time daily as needed., Disp: , Rfl:   •  Multiple Vitamins-Minerals (MULTIVITAL PO), Take  by mouth., Disp: , Rfl:   •  vitamin D, Ergocalciferol, (DRISDOL) 1.25 MG (00857 UT) Cap capsule, Take 1 capsule by mouth once a week, Disp: 12 capsule, Rfl:  "0    Social History:  Social History     Socioeconomic History   • Marital status:      Spouse name: Not on file   • Number of children: Not on file   • Years of education: Not on file   • Highest education level: Not on file   Occupational History   • Not on file   Tobacco Use   • Smoking status: Never Smoker   • Smokeless tobacco: Never Used   Vaping Use   • Vaping Use: Never used   Substance and Sexual Activity   • Alcohol use: Yes     Alcohol/week: 0.0 oz     Comment: 4 per year   • Drug use: No   • Sexual activity: Yes     Partners: Male     Birth control/protection: Surgical   Other Topics Concern   • Not on file   Social History Narrative   • Not on file     Social Determinants of Health     Financial Resource Strain:    • Difficulty of Paying Living Expenses:    Food Insecurity:    • Worried About Running Out of Food in the Last Year:    • Ran Out of Food in the Last Year:    Transportation Needs:    • Lack of Transportation (Medical):    • Lack of Transportation (Non-Medical):    Physical Activity:    • Days of Exercise per Week:    • Minutes of Exercise per Session:    Stress:    • Feeling of Stress :    Social Connections:    • Frequency of Communication with Friends and Family:    • Frequency of Social Gatherings with Friends and Family:    • Attends Confucianism Services:    • Active Member of Clubs or Organizations:    • Attends Club or Organization Meetings:    • Marital Status:    Intimate Partner Violence:    • Fear of Current or Ex-Partner:    • Emotionally Abused:    • Physically Abused:    • Sexually Abused:         Family History:   Family History   Problem Relation Age of Onset   • Heart Disease Mother         67 yo stents, pacemaker x2   • Hypertension Mother    • Hyperlipidemia Mother    • Arrythmia Mother    • Diabetes Maternal Grandmother    • Hyperlipidemia Brother        PHYSICAL EXAM:   Vital signs: /70   Pulse 92   Ht 1.575 m (5' 2\")   Wt 68 kg (150 lb)   SpO2 100%   BMI " 27.44 kg/m²   GENERAL: Well-developed, well-nourished  in no apparent distress.   EYE: No ocular and eyelid asymmetry, Anicteric sclerae,  PERRL, No exophthalmos or lidlag  HENT: Hearing grossly intact, Normocephalic, atraumatic. Pink, moist mucous membranes, No exudate  NECK: Supple. Trachea midline. thyroid is normal in size without nodules or tenderness  CARDIOVASCULAR: Regular rate and rhythm. No murmurs, rubs, or gallops.   LUNGS: Clear to auscultation bilaterally   ABDOMEN: Soft, nontender with positive bowel sounds.   EXTREMITIES: No clubbing, cyanosis, or edema.   NEUROLOGICAL: Cranial nerves II-XII are grossly intact   Symmetric reflexes at the patella no proximal muscle weakness, No visible tremor with both outstretched hands  LYMPH: No cervical, supraclavicular,  adenopathy palpated.   SKIN: No rashes, lesions. Turgor is normal.  FOOT: Normal sensation to monofilament testing, normal pulses, no ulcers.  Normal Vibration quantitative sensation test.    Labs:  Lab Results   Component Value Date/Time    HBA1C 6.4 (A) 09/24/2021 1505    AVGLUC 189 04/08/2021 0631       Lab Results   Component Value Date/Time    WBC 6.1 04/27/2018 07:37 AM    WBC 6.1 07/20/2010 09:00 AM    RBC 5.12 04/27/2018 07:37 AM    RBC 4.99 07/20/2010 09:00 AM    HEMOGLOBIN 14.3 04/27/2018 07:37 AM    MCV 87.7 04/27/2018 07:37 AM    MCV 90 07/20/2010 09:00 AM    MCH 27.9 04/27/2018 07:37 AM    MCH 28.5 07/20/2010 09:00 AM    MCHC 31.8 (L) 04/27/2018 07:37 AM    RDW 42.2 04/27/2018 07:37 AM    RDW 15.1 (H) 07/20/2010 09:00 AM    MPV 10.8 04/27/2018 07:37 AM       Lab Results   Component Value Date/Time    SODIUM 134 (L) 04/08/2021 06:31 AM    POTASSIUM 4.2 04/08/2021 06:31 AM    CHLORIDE 100 04/08/2021 06:31 AM    CO2 25 04/08/2021 06:31 AM    ANION 9.0 04/08/2021 06:31 AM    GLUCOSE 118 (H) 04/08/2021 06:31 AM    BUN 15 04/08/2021 06:31 AM    CREATININE 0.56 04/08/2021 06:31 AM    CREATININE 0.67 07/20/2010 09:00 AM    CALCIUM 9.3  04/08/2021 06:31 AM    ASTSGOT 36 04/08/2021 06:31 AM    ALTSGPT 52 (H) 04/08/2021 06:31 AM    TBILIRUBIN 0.3 04/08/2021 06:31 AM    ALBUMIN 4.2 04/08/2021 06:31 AM    TOTPROTEIN 7.4 04/08/2021 06:31 AM    GLOBULIN 3.2 04/08/2021 06:31 AM    AGRATIO 1.3 04/08/2021 06:31 AM       Lab Results   Component Value Date/Time    CHOLSTRLTOT 210 (H) 04/08/2021 0631    TRIGLYCERIDE 135 04/08/2021 0631    HDL 45 04/08/2021 0631     (H) 04/08/2021 0631       Lab Results   Component Value Date/Time    MALBCRT see below 04/08/2021 06:31 AM    MICROALBUR <1.2 04/08/2021 06:31 AM        Lab Results   Component Value Date/Time    TSHULTRASEN 1.610 09/13/2019 0843     Lab Results   Component Value Date/Time    FREEDIR 1.02 07/20/2010 0900     No results found for: FREET3  No results found for: THYSTIMIG      ASSESSMENT/PLAN:     1. Type 2 diabetes mellitus with hyperglycemia, without long-term current use of insulin (HCC)  Previously uncontrolled now with improved control  Reviewed pathogenesis of type 2 diabetes and the importance of good glucose control and preventing long-term vascular complications  Although she is a candidate for SGLT2 inhibitor therapy I decided against this because of her history of urinary tract infections  I am replacing Januvia with Ozempic because of better data with respect to weight loss and glucose control  I want her to continue Metformin extended release 500 mg 2 pills twice a day  And start Ozempic 0.25 mg weekly to be increased after 1 month to 0.5 mg weekly  Recommend diet and exercise and lifestyle changes  Recommend follow-up in 3 months with repeat of A1c, lipids, TSH and other labs    2. Dyslipidemia  Unstable  Continue statin for primary prevention  Repeat fasting lipids in 3 months    3. Elevated SGOT  Unstable I expect her liver enzymes to improve with weight loss  This is a very mild elevation and it does not require discontinuation of her statin therapy    4. Vitamin D  deficiency  Unstable vitamin D is low at baseline continue ergocalciferol weekly  Repeat calcium vitamin D levels in 3 months    5. Long term (current) use of oral hypoglycemic drugs  Patient is on multiple oral agents for type 2 diabetes management      Return in about 3 months (around 12/24/2021).      This patient during there office visit was started on new medication.  Side effects of new medications were discussed with the patient today in the office. The patient was supplied paperwork on this new medication.    Thank you kindly for allowing me to participate in the diabetes care plan for this patient.    José Luis Ragsdale MD, [facility], [facility]  09/24/21    CC:   DAVIDA Bro

## 2021-10-26 ENCOUNTER — HOSPITAL ENCOUNTER (OUTPATIENT)
Facility: MEDICAL CENTER | Age: 46
End: 2021-10-26
Attending: INTERNAL MEDICINE
Payer: COMMERCIAL

## 2021-10-26 DIAGNOSIS — E11.65 TYPE 2 DIABETES MELLITUS WITH HYPERGLYCEMIA, WITHOUT LONG-TERM CURRENT USE OF INSULIN (HCC): ICD-10-CM

## 2021-10-26 DIAGNOSIS — E55.9 VITAMIN D DEFICIENCY: ICD-10-CM

## 2021-10-26 DIAGNOSIS — Z79.84 LONG TERM (CURRENT) USE OF ORAL HYPOGLYCEMIC DRUGS: ICD-10-CM

## 2021-10-26 DIAGNOSIS — E78.5 DYSLIPIDEMIA: ICD-10-CM

## 2021-10-26 DIAGNOSIS — R74.01 ELEVATED SGOT: ICD-10-CM

## 2021-10-26 PROCEDURE — 84439 ASSAY OF FREE THYROXINE: CPT

## 2021-10-26 PROCEDURE — 80053 COMPREHEN METABOLIC PANEL: CPT

## 2021-10-26 PROCEDURE — 80061 LIPID PANEL: CPT

## 2021-10-26 PROCEDURE — 82306 VITAMIN D 25 HYDROXY: CPT

## 2021-10-26 PROCEDURE — 84443 ASSAY THYROID STIM HORMONE: CPT

## 2021-10-27 LAB
25(OH)D3 SERPL-MCNC: 22 NG/ML (ref 30–100)
ALBUMIN SERPL BCP-MCNC: 5 G/DL (ref 3.2–4.9)
ALBUMIN/GLOB SERPL: 1.6 G/DL
ALP SERPL-CCNC: 78 U/L (ref 30–99)
ALT SERPL-CCNC: 26 U/L (ref 2–50)
ANION GAP SERPL CALC-SCNC: 15 MMOL/L (ref 7–16)
AST SERPL-CCNC: 23 U/L (ref 12–45)
BILIRUB SERPL-MCNC: 0.2 MG/DL (ref 0.1–1.5)
BUN SERPL-MCNC: 13 MG/DL (ref 8–22)
CALCIUM SERPL-MCNC: 10.2 MG/DL (ref 8.5–10.5)
CHLORIDE SERPL-SCNC: 100 MMOL/L (ref 96–112)
CHOLEST SERPL-MCNC: 228 MG/DL (ref 100–199)
CO2 SERPL-SCNC: 21 MMOL/L (ref 20–33)
CREAT SERPL-MCNC: 0.52 MG/DL (ref 0.5–1.4)
GLOBULIN SER CALC-MCNC: 3.2 G/DL (ref 1.9–3.5)
GLUCOSE SERPL-MCNC: 133 MG/DL (ref 65–99)
HDLC SERPL-MCNC: 36 MG/DL
LDLC SERPL CALC-MCNC: 152 MG/DL
POTASSIUM SERPL-SCNC: 5.2 MMOL/L (ref 3.6–5.5)
PROT SERPL-MCNC: 8.2 G/DL (ref 6–8.2)
SODIUM SERPL-SCNC: 136 MMOL/L (ref 135–145)
T4 FREE SERPL-MCNC: 1.06 NG/DL (ref 0.93–1.7)
TRIGL SERPL-MCNC: 202 MG/DL (ref 0–149)
TSH SERPL DL<=0.005 MIU/L-ACNC: 1.23 UIU/ML (ref 0.38–5.33)

## 2021-11-11 ENCOUNTER — PATIENT MESSAGE (OUTPATIENT)
Dept: MEDICAL GROUP | Facility: MEDICAL CENTER | Age: 46
End: 2021-11-11

## 2021-11-16 ENCOUNTER — PATIENT MESSAGE (OUTPATIENT)
Dept: MEDICAL GROUP | Facility: MEDICAL CENTER | Age: 46
End: 2021-11-16

## 2021-11-19 ENCOUNTER — PATIENT MESSAGE (OUTPATIENT)
Dept: MEDICAL GROUP | Facility: MEDICAL CENTER | Age: 46
End: 2021-11-19

## 2021-11-29 ENCOUNTER — OFFICE VISIT (OUTPATIENT)
Dept: MEDICAL GROUP | Facility: MEDICAL CENTER | Age: 46
End: 2021-11-29
Payer: COMMERCIAL

## 2021-11-29 VITALS
RESPIRATION RATE: 18 BRPM | DIASTOLIC BLOOD PRESSURE: 76 MMHG | WEIGHT: 140.21 LBS | HEIGHT: 62 IN | HEART RATE: 80 BPM | SYSTOLIC BLOOD PRESSURE: 118 MMHG | TEMPERATURE: 99 F | BODY MASS INDEX: 25.8 KG/M2 | OXYGEN SATURATION: 98 %

## 2021-11-29 DIAGNOSIS — R51.9 MORNING HEADACHE: ICD-10-CM

## 2021-11-29 DIAGNOSIS — M79.662 BILATERAL CALF PAIN: ICD-10-CM

## 2021-11-29 DIAGNOSIS — M79.661 BILATERAL CALF PAIN: ICD-10-CM

## 2021-11-29 PROCEDURE — 99214 OFFICE O/P EST MOD 30 MIN: CPT | Performed by: NURSE PRACTITIONER

## 2021-11-29 NOTE — PATIENT INSTRUCTIONS
Natural treatment options for frequent headache or migraine:    1. Start magnesium oxide 400mg by mouth every night, may take extra dose if needed for headache (over the counter), hold for diarrhea  2. Start Riboflavin (Vitamin B2) 400mg by mouth every night (over the counter),may turn urine bright yellow  3. Start COQ 10, take 300mg every night. (over the counter)  4. Attempt to go to bed and get up at the same time every night  5. Eat meals on regular basis  6. Stay hydrated  7. Exercise 30 minutes daily  8. Avoid aged or smoked foods, avoid processed foods, red wine, aged cheese  9. Keep headache diary, include foods that you may have eaten.  10. Avoid overusing over the counter medications:  Do not take more than 500mg acetaminophen (tylenol), more than 4 times weekly, more frequent or larger doses are associated with medication overuse headache.                               To Do:  1. Complete labs  2. Schedule ultrasounds [phone]

## 2021-11-29 NOTE — LETTER
November 29, 2021        RE: Sybil Manuel    To Whom It May Concern;    Sybil Manuel was seen today in my office for a medical appointment. Please excuse her work absence today due to illness.    Sincerely       Minal SALAZAR

## 2021-12-01 PROBLEM — M79.662 BILATERAL CALF PAIN: Status: ACTIVE | Noted: 2021-12-01

## 2021-12-01 PROBLEM — M79.661 BILATERAL CALF PAIN: Status: ACTIVE | Noted: 2021-12-01

## 2021-12-01 NOTE — ASSESSMENT & PLAN NOTE
This is been a new problem with progressive worsening over the last several months.  She notes discomfort and aching in bilateral calfs which tends to be worse at night, causes her difficulty getting to sleep at times.   states that she kicks around somewhat restlessly after falling asleep.  Massage and topical analgesics do seem to help.  She is concerned about vascular disease and states that this runs in her family.  She has not had any lower extremity swelling, discoloration, ulcerations.  No cramping, numbness, tingling, or burning type pain.  No pain in the feet.  She does feel that her legs get very cold  She has been on atorvastatin for dyslipidemia related to diabetes

## 2021-12-01 NOTE — PROGRESS NOTES
Subjective:     Chief Complaint   Patient presents with   • Loss of Appetite   • Leg Pain   • Headache     Sybil Manuel is a 46 y.o. female here today to follow up on:    Morning headache  Notes that she is waking up with a headache several days weekly, tends to improve through the day as she gets moving or takes an over-the-counter pain reliever.  She has no history of sleep apnea, no snoring or frequent waking during the night.  No history of migraine or hypertension.  She has not noted any other specific triggers to this, no changes in her schedule her daily routine.  No blurred vision, dizziness    Diabetes mellitus type 2, uncontrolled, with complications (HCC)  Last A1c had increased to 8.2 earlier this year but did improve over subsequent months with last reading of 6.4 in September.  She was seen by Dr. Allen with endocrinology,  Ozempic was added at that time and she has titrated up to 0.5 mg weekly, is generally tolerating this well.  She does report that she had over eaten on occasion and had nausea but this seems to have been an isolated event.  She does note that her appetite is decreased, we discussed that this is an expected side effect  No polyuria, polydipsia, acute abdominal pain, numbness or tingling in extremities    Bilateral calf pain  This is been a new problem with progressive worsening over the last several months.  She notes discomfort and aching in bilateral calfs which tends to be worse at night, causes her difficulty getting to sleep at times.   states that she kicks around somewhat restlessly after falling asleep.  Massage and topical analgesics do seem to help.  She is concerned about vascular disease and states that this runs in her family.  She has not had any lower extremity swelling, discoloration, ulcerations.  No cramping, numbness, tingling, or burning type pain.  No pain in the feet.  She does feel that her legs get very cold  She has been on atorvastatin for  dyslipidemia related to diabetes       Current medicines (including changes today)  Current Outpatient Medications   Medication Sig Dispense Refill   • albuterol 108 (90 Base) MCG/ACT Aero Soln inhalation aerosol INHALE 2 PUFFS BY MOUTH EVERY 6 HOURS AS NEEDED FOR SHORTNESS OF BREATH 9 g 5   • Semaglutide,0.25 or 0.5MG/DOS, (OZEMPIC, 0.25 OR 0.5 MG/DOSE,) 2 MG/1.5ML Solution Pen-injector Inject 0.5 mg under the skin every 7 days. 1 pen per month 1.5 mL 11   • metFORMIN ER (GLUCOPHAGE XR) 500 MG TABLET SR 24 HR Take 2 Tablets by mouth 2 times a day. 360 Tablet 3   • vitamin D, Ergocalciferol, (DRISDOL) 1.25 MG (15905 UT) Cap capsule Take 1 capsule by mouth once a week 12 capsule 0   • atorvastatin (LIPITOR) 20 MG Tab Take 1 Tab by mouth every day. 90 Tab 1   • albuterol (PROVENTIL) 2.5mg/3ml Nebu Soln solution for nebulization 3 mL by Nebulization route every four hours as needed for Shortness of Breath. 30 Bullet 0   • LORATADINE Take 10 mg by mouth 1 time daily as needed.     • Multiple Vitamins-Minerals (MULTIVITAL PO) Take  by mouth.       No current facility-administered medications for this visit.     She  has a past medical history of Allergy, Anxiety, ASTHMA (9/3/2010), Depression (2008), Diabetes mellitus type II (9/3/2010), Elevated liver enzymes (2009), GERD (gastroesophageal reflux disease), Hyperlipidemia, Urinary tract infection, site not specified, and Vitamin D deficiency (2009). She also has no past medical history of Addisons disease ([facility]), Adrenal disorder ([facility]), Anemia, Arrhythmia, Arthritis, Blood transfusion, CATARACT, CHF (congestive heart failure) ([facility]), Clotting disorder ([facility]), COPD, Cushings syndrome ([facility]), EMPHYSEMA, Glaucoma, Goiter, Headache(784.0), Heart attack ([facility]), Heart murmur, Hypertension, IBD (inflammatory bowel disease), Kidney disease, Meningitis, Migraine, Muscle disorder, OSTEOPOROSIS, Parathyroid disorder ([facility]), Pituitary disease ([facility]), Seizure ([facility]), Stroke ([facility]), Substance  "abuse (HCC), Thyroid disease, Tuberculosis, or Ulcer.    ROS included above     Objective:     /76 (BP Location: Left arm, Patient Position: Sitting, BP Cuff Size: Adult)   Pulse 80   Temp 37.2 °C (99 °F) (Temporal)   Resp 18   Ht 1.575 m (5' 2\")   Wt 63.6 kg (140 lb 3.4 oz)   SpO2 98%  Body mass index is 25.65 kg/m².     Physical Exam:  General: Alert, oriented in no acute distress.  Eye contact is good, speech is normal, affect calm.  Lungs: clear to auscultation bilaterally, normal effort, no wheeze/ rhonchi/ rales.  CV: regular rate and rhythm, S1, S2, no murmur  Abdomen: soft, nontender  MS: Bilateral lower extremities without swelling or erythema, negative Homans' sign.  Temperature color normal.  Pedal pulses 2+ bilaterally  Ext: no edema, color normal, vascularity normal, temperature normal    Assessment and Plan:   The following treatment plan was discussed   1. Bilateral calf pain   concern with pain and achiness in bilateral calfs primarily in the evening, having trouble getting comfortable for sleep.  Exam is normal without swelling, discoloration, or concern for DVT.  I do not suspect this to be a circulatory issue but she is particularly concerned given her family history.  We will evaluate ultrasound as listed.  This also does not seem consistent with neuropathy, she has no foot involvement.  Additional labs as well.  She is interested in trying acupuncture which I think is reasonable, referral placed.  Possible this could be related to her statin.  I will follow up with her with lab results.  Encouraged regular exercise and stretching.  US-EXTREMITY ARTERY LOWER BILAT W/SONIA (COMBO)    US-EXTREMITY VENOUS LOWER BILAT    CBC WITH DIFFERENTIAL    IRON/TOTAL IRON BIND    Basic Metabolic Panel    CREATINE KINASE    Referral for Acupuncture   2. Morning headache   frequent headaches first thing in the morning over the last several weeks.  No history of sleep apnea or symptoms that would raise " concern for sleep disordered breathing.  Information provided for natural treatment options as detailed below, she will let me know if this seems to help.  May need further evaluation if not improving  Natural treatment options for frequent headache or migraine:    1. Start magnesium oxide 400mg by mouth every night, may take extra dose if needed for headache (over the counter), hold for diarrhea  2. Start Riboflavin (Vitamin B2) 400mg by mouth every night (over the counter),may turn urine bright yellow  3. Start COQ 10, take 300mg every night. (over the counter)  4. Attempt to go to bed and get up at the same time every night  5. Eat meals on regular basis  6. Stay hydrated  7. Exercise 30 minutes daily  8. Avoid aged or smoked foods, avoid processed foods, red wine, aged cheese  9. Keep headache diary, include foods that you may have eaten.  10. Avoid overusing over the counter medications:  Do not take more than 500mg acetaminophen (tylenol), more than 4 times weekly, more frequent or larger doses are associated with medication overuse headache.     3. Diabetes mellitus type 2, uncontrolled, with complications (HCC)   improved with last A1c at 6.4.  She raises concerns today with decreased appetite, we have discussed that this is expected with Ozempic.  Reassurance provided.  She will continue follow-up with endocrinology       Followup: pending labs         Please note that this dictation was created using voice recognition software. I have worked with consultants from the vendor as well as technical experts from [facility] to optimize the interface. I have made every reasonable attempt to correct obvious errors, but I expect that there are errors of grammar and possibly content that I did not discover before finalizing the note.

## 2021-12-01 NOTE — ASSESSMENT & PLAN NOTE
Notes that she is waking up with a headache several days weekly, tends to improve through the day as she gets moving or takes an over-the-counter pain reliever.  She has no history of sleep apnea, no snoring or frequent waking during the night.  No history of migraine or hypertension.  She has not noted any other specific triggers to this, no changes in her schedule her daily routine.  No blurred vision, dizziness

## 2021-12-01 NOTE — ASSESSMENT & PLAN NOTE
Last A1c had increased to 8.2 earlier this year but did improve over subsequent months with last reading of 6.4 in September.  She was seen by Dr. Allen with endocrinology,  Ozempic was added at that time and she has titrated up to 0.5 mg weekly, is generally tolerating this well.  She does report that she had over eaten on occasion and had nausea but this seems to have been an isolated event.  She does note that her appetite is decreased, we discussed that this is an expected side effect  No polyuria, polydipsia, acute abdominal pain, numbness or tingling in extremities

## 2021-12-10 ENCOUNTER — HOSPITAL ENCOUNTER (OUTPATIENT)
Dept: LAB | Facility: MEDICAL CENTER | Age: 46
End: 2021-12-10
Attending: NURSE PRACTITIONER
Payer: COMMERCIAL

## 2021-12-10 DIAGNOSIS — M79.662 BILATERAL CALF PAIN: ICD-10-CM

## 2021-12-10 DIAGNOSIS — M79.661 BILATERAL CALF PAIN: ICD-10-CM

## 2021-12-10 LAB
ANION GAP SERPL CALC-SCNC: 12 MMOL/L (ref 7–16)
ANISOCYTOSIS BLD QL SMEAR: ABNORMAL
BASOPHILS # BLD AUTO: 0 % (ref 0–1.8)
BASOPHILS # BLD: 0 K/UL (ref 0–0.12)
BUN SERPL-MCNC: 13 MG/DL (ref 8–22)
CALCIUM SERPL-MCNC: 9.6 MG/DL (ref 8.5–10.5)
CHLORIDE SERPL-SCNC: 102 MMOL/L (ref 96–112)
CK SERPL-CCNC: 48 U/L (ref 0–154)
CO2 SERPL-SCNC: 22 MMOL/L (ref 20–33)
CREAT SERPL-MCNC: 0.57 MG/DL (ref 0.5–1.4)
EOSINOPHIL # BLD AUTO: 0 K/UL (ref 0–0.51)
EOSINOPHIL NFR BLD: 0 % (ref 0–6.9)
ERYTHROCYTE [DISTWIDTH] IN BLOOD BY AUTOMATED COUNT: 42.6 FL (ref 35.9–50)
GLUCOSE SERPL-MCNC: 104 MG/DL (ref 65–99)
HCT VFR BLD AUTO: 29.7 % (ref 37–47)
HGB BLD-MCNC: 8.4 G/DL (ref 12–16)
HYPOCHROMIA BLD QL SMEAR: ABNORMAL
IRON SATN MFR SERPL: 3 % (ref 15–55)
IRON SERPL-MCNC: 14 UG/DL (ref 40–170)
LYMPHOCYTES # BLD AUTO: 1.84 K/UL (ref 1–4.8)
LYMPHOCYTES NFR BLD: 32.2 % (ref 22–41)
MANUAL DIFF BLD: NORMAL
MCH RBC QN AUTO: 19.3 PG (ref 27–33)
MCHC RBC AUTO-ENTMCNC: 28.3 G/DL (ref 33.6–35)
MCV RBC AUTO: 68.3 FL (ref 81.4–97.8)
MICROCYTES BLD QL SMEAR: ABNORMAL
MONOCYTES # BLD AUTO: 0.74 K/UL (ref 0–0.85)
MONOCYTES NFR BLD AUTO: 13 % (ref 0–13.4)
MORPHOLOGY BLD-IMP: NORMAL
NEUTROPHILS # BLD AUTO: 3.12 K/UL (ref 2–7.15)
NEUTROPHILS NFR BLD: 54.8 % (ref 44–72)
NRBC # BLD AUTO: 0 K/UL
NRBC BLD-RTO: 0 /100 WBC
OVALOCYTES BLD QL SMEAR: NORMAL
PLATELET # BLD AUTO: 523 K/UL (ref 164–446)
PLATELET BLD QL SMEAR: NORMAL
PMV BLD AUTO: 9.9 FL (ref 9–12.9)
POIKILOCYTOSIS BLD QL SMEAR: NORMAL
POLYCHROMASIA BLD QL SMEAR: NORMAL
POTASSIUM SERPL-SCNC: 5.2 MMOL/L (ref 3.6–5.5)
RBC # BLD AUTO: 4.35 M/UL (ref 4.2–5.4)
RBC BLD AUTO: PRESENT
SODIUM SERPL-SCNC: 136 MMOL/L (ref 135–145)
TIBC SERPL-MCNC: 427 UG/DL (ref 250–450)
UIBC SERPL-MCNC: 413 UG/DL (ref 110–370)
WBC # BLD AUTO: 5.7 K/UL (ref 4.8–10.8)

## 2021-12-10 PROCEDURE — 82550 ASSAY OF CK (CPK): CPT

## 2021-12-10 PROCEDURE — 85027 COMPLETE CBC AUTOMATED: CPT

## 2021-12-10 PROCEDURE — 36415 COLL VENOUS BLD VENIPUNCTURE: CPT

## 2021-12-10 PROCEDURE — 80048 BASIC METABOLIC PNL TOTAL CA: CPT

## 2021-12-10 PROCEDURE — 85007 BL SMEAR W/DIFF WBC COUNT: CPT

## 2021-12-10 PROCEDURE — 83540 ASSAY OF IRON: CPT

## 2021-12-10 PROCEDURE — 83550 IRON BINDING TEST: CPT

## 2021-12-16 ENCOUNTER — TELEPHONE (OUTPATIENT)
Dept: MEDICAL GROUP | Facility: MEDICAL CENTER | Age: 46
End: 2021-12-16

## 2021-12-16 NOTE — TELEPHONE ENCOUNTER
Labs reviewed by phone- indicating anemia. She is having very heavy menses, up to 10 days at a time, passing clots. No h/o GI bleed, no NSAID or ETOH use. No melena.    She has been reluctant to take oral contraceptive. Discussed alternatives including possible IUD placement, ablation. Pt is followed by Dr. Coffman, will sent labs.  She will call to schedule with OB/GYN.    Advised to start oral iron supplement, stool softener if needed    ER precautions reviewed    Minal SALAZAR

## 2021-12-28 ENCOUNTER — APPOINTMENT (OUTPATIENT)
Dept: ENDOCRINOLOGY | Facility: MEDICAL CENTER | Age: 46
End: 2021-12-28
Attending: INTERNAL MEDICINE
Payer: COMMERCIAL

## 2021-12-31 ENCOUNTER — HOSPITAL ENCOUNTER (OUTPATIENT)
Dept: RADIOLOGY | Facility: MEDICAL CENTER | Age: 46
End: 2021-12-31
Attending: NURSE PRACTITIONER
Payer: COMMERCIAL

## 2021-12-31 DIAGNOSIS — M79.662 BILATERAL CALF PAIN: ICD-10-CM

## 2021-12-31 DIAGNOSIS — M79.661 BILATERAL CALF PAIN: ICD-10-CM

## 2021-12-31 PROCEDURE — 93922 UPR/L XTREMITY ART 2 LEVELS: CPT | Mod: 26 | Performed by: INTERNAL MEDICINE

## 2021-12-31 PROCEDURE — 93970 EXTREMITY STUDY: CPT

## 2021-12-31 PROCEDURE — 93922 UPR/L XTREMITY ART 2 LEVELS: CPT

## 2021-12-31 PROCEDURE — 93970 EXTREMITY STUDY: CPT | Mod: 26 | Performed by: INTERNAL MEDICINE

## 2022-01-28 ENCOUNTER — HOSPITAL ENCOUNTER (OUTPATIENT)
Facility: MEDICAL CENTER | Age: 47
End: 2022-01-28
Attending: STUDENT IN AN ORGANIZED HEALTH CARE EDUCATION/TRAINING PROGRAM
Payer: COMMERCIAL

## 2022-01-28 ENCOUNTER — HOSPITAL ENCOUNTER (OUTPATIENT)
Dept: RADIOLOGY | Facility: MEDICAL CENTER | Age: 47
End: 2022-01-28
Attending: STUDENT IN AN ORGANIZED HEALTH CARE EDUCATION/TRAINING PROGRAM
Payer: COMMERCIAL

## 2022-01-28 ENCOUNTER — OFFICE VISIT (OUTPATIENT)
Dept: URGENT CARE | Facility: PHYSICIAN GROUP | Age: 47
End: 2022-01-28
Payer: COMMERCIAL

## 2022-01-28 VITALS
SYSTOLIC BLOOD PRESSURE: 112 MMHG | HEART RATE: 83 BPM | TEMPERATURE: 97.7 F | RESPIRATION RATE: 16 BRPM | OXYGEN SATURATION: 99 % | BODY MASS INDEX: 24.1 KG/M2 | HEIGHT: 63 IN | DIASTOLIC BLOOD PRESSURE: 70 MMHG | WEIGHT: 136 LBS

## 2022-01-28 DIAGNOSIS — B34.9 ACUTE VIRAL SYNDROME: ICD-10-CM

## 2022-01-28 DIAGNOSIS — R22.1 NECK SWELLING: ICD-10-CM

## 2022-01-28 DIAGNOSIS — Z20.822 COVID-19 RULED OUT: ICD-10-CM

## 2022-01-28 PROCEDURE — 76536 US EXAM OF HEAD AND NECK: CPT

## 2022-01-28 PROCEDURE — U0005 INFEC AGEN DETEC AMPLI PROBE: HCPCS

## 2022-01-28 PROCEDURE — U0003 INFECTIOUS AGENT DETECTION BY NUCLEIC ACID (DNA OR RNA); SEVERE ACUTE RESPIRATORY SYNDROME CORONAVIRUS 2 (SARS-COV-2) (CORONAVIRUS DISEASE [COVID-19]), AMPLIFIED PROBE TECHNIQUE, MAKING USE OF HIGH THROUGHPUT TECHNOLOGIES AS DESCRIBED BY CMS-2020-01-R: HCPCS

## 2022-01-28 PROCEDURE — 99215 OFFICE O/P EST HI 40 MIN: CPT | Mod: CS | Performed by: STUDENT IN AN ORGANIZED HEALTH CARE EDUCATION/TRAINING PROGRAM

## 2022-01-28 ASSESSMENT — FIBROSIS 4 INDEX: FIB4 SCORE: 0.41

## 2022-01-28 NOTE — PROGRESS NOTES
Subjective:   CHIEF COMPLAINT  Chief Complaint   Patient presents with   • Pharyngitis     Body aches, fatigue, headaches. Onset 3 days.        HPI  Sybil Manuel is a 46 y.o. female who presents with a chief complaint of sore throat, body aches, headache and fatigue for 2 days.  She tried taking ibuprofen which provided nominal relief of symptoms.  Positive ROS for shortness of breath.  No wheezing.  Patient is vaccine against COVID x3.  A few colleagues at work have tested positive for Covid.    Patient was reports some swelling over the last couple days along the right side of her neck, below her lymph nodes.     REVIEW OF SYSTEMS  General: no fever or chills  GI: no nausea or vomiting  See HPI for further details.     PAST MEDICAL HISTORY  Patient Active Problem List    Diagnosis Date Noted   • Bilateral calf pain 12/01/2021   • Morning headache 11/29/2021   • Dyslipidemia 09/24/2021   • Long term (current) use of oral hypoglycemic drugs 09/24/2021   • Palpitations 04/16/2021   • Lymph node symptom 08/23/2019   • Diabetes mellitus type 2, uncontrolled, with complications (HCC) 07/13/2018   • Elevated liver enzymes 07/13/2018   • Dyslipidemia associated with type 2 diabetes mellitus (HCC) 07/13/2018   • Seasonal allergies 07/15/2016   • Acne 07/21/2011   • Elevated SGOT 09/03/2010   • Vitamin D deficiency 09/03/2010       SURGICAL HISTORY   has a past surgical history that includes primary c section and tubal coagulation laparoscopic bilateral.    ALLERGIES  No Known Allergies    CURRENT MEDICATIONS  Home Medications     Reviewed by Maxim Briggs D.O. (Physician) on 01/28/22 at 1138  Med List Status: <None>   Medication Last Dose Status   albuterol (PROVENTIL) 2.5mg/3ml Nebu Soln solution for nebulization Not Taking Flagged for Removal   albuterol 108 (90 Base) MCG/ACT Aero Soln inhalation aerosol PRN Active   atorvastatin (LIPITOR) 20 MG Tab Taking Active   LORATADINE PRN Active   metFORMIN ER (GLUCOPHAGE  "XR) 500 MG TABLET SR 24 HR Taking Active   Multiple Vitamins-Minerals (MULTIVITAL PO) Taking Active   Semaglutide,0.25 or 0.5MG/DOS, (OZEMPIC, 0.25 OR 0.5 MG/DOSE,) 2 MG/1.5ML Solution Pen-injector Taking Active   vitamin D, Ergocalciferol, (DRISDOL) 1.25 MG (51355 UT) Cap capsule Taking Active                SOCIAL HISTORY  Social History     Tobacco Use   • Smoking status: Never Smoker   • Smokeless tobacco: Never Used   Vaping Use   • Vaping Use: Never used   Substance and Sexual Activity   • Alcohol use: Yes     Alcohol/week: 0.0 oz     Comment: 4 per year   • Drug use: No   • Sexual activity: Yes     Partners: Male     Birth control/protection: Surgical       FAMILY HISTORY  Family History   Problem Relation Age of Onset   • Heart Disease Mother         65 yo stents, pacemaker x2   • Hypertension Mother    • Hyperlipidemia Mother    • Arrythmia Mother    • Diabetes Maternal Grandmother    • Hyperlipidemia Brother           Objective:   PHYSICAL EXAM  VITAL SIGNS: /70 (BP Location: Left arm, Patient Position: Sitting, BP Cuff Size: Adult)   Pulse 83   Temp 36.5 °C (97.7 °F) (Temporal)   Resp 16   Ht 1.588 m (5' 2.5\")   Wt 61.7 kg (136 lb)   SpO2 99%   BMI 24.48 kg/m²     Gen: no acute distress, normal voice  Skin: dry, intact, moist mucosal membranes  ENT: No pharyngeal erythema or exudates.  Uvula midline.  Swelling along anterior right neck; difficult to discern if this is coming from thyroid.  No lymphadenopathy.  Lungs: CTAB w/ symmetric expansion  CV: RRR w/o murmurs or clicks  Psych: normal affect, normal judgement, alert, awake      RADIOLOGY RESULTS   US-THYROID    Result Date: 1/31/2022 1/28/2022 5:17 PM HISTORY/REASON FOR EXAM:  Neck swelling TECHNIQUE/EXAM DESCRIPTION: Ultrasound of the soft tissues of the head and neck. COMPARISON:  None FINDINGS: The thyroid gland is heterogeneous. Vascularity is normal. The right lobe of the thyroid gland measures 1.54 cm x 5.10 cm x 1.17 cm. The left " lobe of the thyroid gland measures 1.23 cm x 4.28 cm x 1.88 cm. The isthmus measures 0.29 cm. Nodules >= 1cm: 1 Nodule #1 Location:  Left  mid Size:  1.12 x 0.91 x 0.72 cm Composition:  Solid-2 Echogenicity:  Hypoechoic-2 Shape:  Wider than tall-0 Margins:  Ill defined-0 Echogenic Foci:  None-0 ACR TIRADS points/category:  4 - TR4 - Moderately Suspicious There is a 1.8 x 0.9 x 0.5 cm lymph node within the lower left soft tissues of the neck. No definite echogenic hilus identified.     1.  1.1 cm nodule left lobe of the thyroid ACR TI-RADS Recommendations TR4 (1-1.4cm) - follow up ultrasound in 1,2,3 and 5 years Recommendations based on the American College of Radiology Thyroid imaging, reporting and Data System (TI-RADS) 2017. 2. Prominent lymph node lower left soft tissues of the neck. INTERPRETING LOCATION: [address] [place] [address], [place], [id number]             Assessment/Plan:     1. Acute viral syndrome  COVID/SARS CoV-2 PCR   2. COVID-19 ruled out     3. Neck swelling  US-THYROID    1-2) Signs and symptoms are consistent with a viral upper respiratory tract infection.  She is vaccinated against Covid x3.  -Ordered Covid.  Results will be sent through [facility].  -Instructed to quarantine until Covid results have been returned  -Encouraged hydration and symptomatic treatment with Tylenol/ibuprofen prn  -Discussed red flags and return precautions.  Patient verbalized their understanding.  All questions were answered.    3) I contacted the patient and left her voicemail on 1/31/2022 informing her of the findings of the ultrasound.  There was evidence of a 1.1 cm nodule in left lobe.  She was instructed to have follow-up ultrasounds in 1, 2, 3 and 5 years.  She was instructed to follow-up with her PCP for continued management.  I informed the patient I am working at the [place] urgent care all day today and if she has any further questions or concerns she can feel free to reach out to me directly.    Differential diagnosis,  natural history, supportive care, and indications for immediate follow-up discussed. Patient agrees with the plan of care.    Follow-up as needed if symptoms worsen or fail to improve to PCP, Urgent care or Emergency Room.       40 minutes was spent on this encounter including face-to-face time, discussing the diagnosis, medical management, follow-up, emergency room precautions and charting. This does not include time spent on separately billable procedures/tests.      Please note that this dictation was created using voice recognition software. I have made a reasonable attempt to correct obvious errors, but I expect that there are errors of grammar and possibly content that I did not discover before finalizing the note.

## 2022-01-29 DIAGNOSIS — B34.9 ACUTE VIRAL SYNDROME: ICD-10-CM

## 2022-03-18 DIAGNOSIS — E55.9 VITAMIN D DEFICIENCY: ICD-10-CM

## 2022-03-18 DIAGNOSIS — E78.5 DYSLIPIDEMIA: ICD-10-CM

## 2022-03-18 DIAGNOSIS — R74.01 ELEVATED SGOT: ICD-10-CM

## 2022-03-18 DIAGNOSIS — E11.65 TYPE 2 DIABETES MELLITUS WITH HYPERGLYCEMIA, WITHOUT LONG-TERM CURRENT USE OF INSULIN (HCC): ICD-10-CM

## 2022-03-18 DIAGNOSIS — R53.83 FATIGUE, UNSPECIFIED TYPE: ICD-10-CM

## 2022-03-19 ENCOUNTER — HOSPITAL ENCOUNTER (OUTPATIENT)
Dept: LAB | Facility: MEDICAL CENTER | Age: 47
End: 2022-03-19
Attending: INTERNAL MEDICINE
Payer: COMMERCIAL

## 2022-03-19 DIAGNOSIS — E78.5 DYSLIPIDEMIA: ICD-10-CM

## 2022-03-19 DIAGNOSIS — E11.65 TYPE 2 DIABETES MELLITUS WITH HYPERGLYCEMIA, WITHOUT LONG-TERM CURRENT USE OF INSULIN (HCC): ICD-10-CM

## 2022-03-19 DIAGNOSIS — E55.9 VITAMIN D DEFICIENCY: ICD-10-CM

## 2022-03-19 DIAGNOSIS — R74.01 ELEVATED SGOT: ICD-10-CM

## 2022-03-19 DIAGNOSIS — R53.83 FATIGUE, UNSPECIFIED TYPE: ICD-10-CM

## 2022-03-19 LAB
25(OH)D3 SERPL-MCNC: 22 NG/ML (ref 30–100)
ALBUMIN SERPL BCP-MCNC: 5 G/DL (ref 3.2–4.9)
ALBUMIN/GLOB SERPL: 1.6 G/DL
ALP SERPL-CCNC: 78 U/L (ref 30–99)
ALT SERPL-CCNC: 14 U/L (ref 2–50)
ANION GAP SERPL CALC-SCNC: 10 MMOL/L (ref 7–16)
ANISOCYTOSIS BLD QL SMEAR: ABNORMAL
AST SERPL-CCNC: 20 U/L (ref 12–45)
BASOPHILS # BLD AUTO: 0.9 % (ref 0–1.8)
BASOPHILS # BLD: 0.05 K/UL (ref 0–0.12)
BILIRUB SERPL-MCNC: 0.2 MG/DL (ref 0.1–1.5)
BUN SERPL-MCNC: 10 MG/DL (ref 8–22)
CALCIUM SERPL-MCNC: 9.9 MG/DL (ref 8.5–10.5)
CHLORIDE SERPL-SCNC: 103 MMOL/L (ref 96–112)
CHOLEST SERPL-MCNC: 188 MG/DL (ref 100–199)
CO2 SERPL-SCNC: 24 MMOL/L (ref 20–33)
CREAT SERPL-MCNC: 0.62 MG/DL (ref 0.5–1.4)
CREAT UR-MCNC: 102.49 MG/DL
EOSINOPHIL # BLD AUTO: 0.15 K/UL (ref 0–0.51)
EOSINOPHIL NFR BLD: 2.6 % (ref 0–6.9)
ERYTHROCYTE [DISTWIDTH] IN BLOOD BY AUTOMATED COUNT: 69 FL (ref 35.9–50)
EST. AVERAGE GLUCOSE BLD GHB EST-MCNC: 94 MG/DL
GFR SERPLBLD CREATININE-BSD FMLA CKD-EPI: 110 ML/MIN/1.73 M 2
GLOBULIN SER CALC-MCNC: 3.1 G/DL (ref 1.9–3.5)
GLUCOSE SERPL-MCNC: 90 MG/DL (ref 65–99)
HBA1C MFR BLD: 4.9 % (ref 4–5.6)
HCT VFR BLD AUTO: 42.1 % (ref 37–47)
HDLC SERPL-MCNC: 37 MG/DL
HGB BLD-MCNC: 13.3 G/DL (ref 12–16)
HYPOCHROMIA BLD QL SMEAR: ABNORMAL
IRON SATN MFR SERPL: 13 % (ref 15–55)
IRON SERPL-MCNC: 48 UG/DL (ref 40–170)
LDLC SERPL CALC-MCNC: 122 MG/DL
LYMPHOCYTES # BLD AUTO: 1.72 K/UL (ref 1–4.8)
LYMPHOCYTES NFR BLD: 30.7 % (ref 22–41)
MANUAL DIFF BLD: NORMAL
MCH RBC QN AUTO: 26 PG (ref 27–33)
MCHC RBC AUTO-ENTMCNC: 31.6 G/DL (ref 33.6–35)
MCV RBC AUTO: 82.4 FL (ref 81.4–97.8)
MICROALBUMIN UR-MCNC: 25.5 MG/DL
MICROALBUMIN/CREAT UR: 249 MG/G (ref 0–30)
MICROCYTES BLD QL SMEAR: ABNORMAL
MONOCYTES # BLD AUTO: 0.44 K/UL (ref 0–0.85)
MONOCYTES NFR BLD AUTO: 7.9 % (ref 0–13.4)
MORPHOLOGY BLD-IMP: NORMAL
NEUTROPHILS # BLD AUTO: 3.24 K/UL (ref 2–7.15)
NEUTROPHILS NFR BLD: 57.9 % (ref 44–72)
NRBC # BLD AUTO: 0 K/UL
NRBC BLD-RTO: 0 /100 WBC
OVALOCYTES BLD QL SMEAR: NORMAL
PLATELET # BLD AUTO: 394 K/UL (ref 164–446)
PLATELET BLD QL SMEAR: NORMAL
PMV BLD AUTO: 10.3 FL (ref 9–12.9)
POIKILOCYTOSIS BLD QL SMEAR: NORMAL
POTASSIUM SERPL-SCNC: 5.7 MMOL/L (ref 3.6–5.5)
PROT SERPL-MCNC: 8.1 G/DL (ref 6–8.2)
RBC # BLD AUTO: 5.11 M/UL (ref 4.2–5.4)
RBC BLD AUTO: PRESENT
SODIUM SERPL-SCNC: 137 MMOL/L (ref 135–145)
T4 FREE SERPL-MCNC: 1.14 NG/DL (ref 0.93–1.7)
TIBC SERPL-MCNC: 375 UG/DL (ref 250–450)
TRIGL SERPL-MCNC: 144 MG/DL (ref 0–149)
TSH SERPL DL<=0.005 MIU/L-ACNC: 1.15 UIU/ML (ref 0.38–5.33)
UIBC SERPL-MCNC: 327 UG/DL (ref 110–370)
WBC # BLD AUTO: 5.6 K/UL (ref 4.8–10.8)

## 2022-03-19 PROCEDURE — 82306 VITAMIN D 25 HYDROXY: CPT

## 2022-03-19 PROCEDURE — 82570 ASSAY OF URINE CREATININE: CPT

## 2022-03-19 PROCEDURE — 83540 ASSAY OF IRON: CPT

## 2022-03-19 PROCEDURE — 83036 HEMOGLOBIN GLYCOSYLATED A1C: CPT

## 2022-03-19 PROCEDURE — 83550 IRON BINDING TEST: CPT

## 2022-03-19 PROCEDURE — 82043 UR ALBUMIN QUANTITATIVE: CPT

## 2022-03-19 PROCEDURE — 84439 ASSAY OF FREE THYROXINE: CPT

## 2022-03-19 PROCEDURE — 80053 COMPREHEN METABOLIC PANEL: CPT

## 2022-03-19 PROCEDURE — 85027 COMPLETE CBC AUTOMATED: CPT

## 2022-03-19 PROCEDURE — 84443 ASSAY THYROID STIM HORMONE: CPT

## 2022-03-19 PROCEDURE — 36415 COLL VENOUS BLD VENIPUNCTURE: CPT

## 2022-03-19 PROCEDURE — 85007 BL SMEAR W/DIFF WBC COUNT: CPT

## 2022-03-19 PROCEDURE — 80061 LIPID PANEL: CPT

## 2022-03-25 ENCOUNTER — OFFICE VISIT (OUTPATIENT)
Dept: ENDOCRINOLOGY | Facility: MEDICAL CENTER | Age: 47
End: 2022-03-25
Attending: INTERNAL MEDICINE
Payer: COMMERCIAL

## 2022-03-25 VITALS
WEIGHT: 133 LBS | OXYGEN SATURATION: 100 % | HEART RATE: 68 BPM | BODY MASS INDEX: 24.48 KG/M2 | HEIGHT: 62 IN | DIASTOLIC BLOOD PRESSURE: 80 MMHG | SYSTOLIC BLOOD PRESSURE: 122 MMHG

## 2022-03-25 DIAGNOSIS — E87.5 HYPERKALEMIA: ICD-10-CM

## 2022-03-25 DIAGNOSIS — E78.5 DYSLIPIDEMIA: ICD-10-CM

## 2022-03-25 DIAGNOSIS — Z79.84 LONG TERM (CURRENT) USE OF ORAL HYPOGLYCEMIC DRUGS: ICD-10-CM

## 2022-03-25 DIAGNOSIS — E11.9 CONTROLLED TYPE 2 DIABETES MELLITUS WITHOUT COMPLICATION, WITHOUT LONG-TERM CURRENT USE OF INSULIN (HCC): ICD-10-CM

## 2022-03-25 DIAGNOSIS — R80.9 MICROALBUMINURIA: ICD-10-CM

## 2022-03-25 DIAGNOSIS — E55.9 VITAMIN D DEFICIENCY: ICD-10-CM

## 2022-03-25 PROCEDURE — 99212 OFFICE O/P EST SF 10 MIN: CPT | Performed by: INTERNAL MEDICINE

## 2022-03-25 PROCEDURE — 99214 OFFICE O/P EST MOD 30 MIN: CPT | Performed by: INTERNAL MEDICINE

## 2022-03-25 RX ORDER — ATORVASTATIN CALCIUM 40 MG/1
40 TABLET, FILM COATED ORAL DAILY
Qty: 90 TABLET | Refills: 3 | Status: SHIPPED | OUTPATIENT
Start: 2022-03-25 | End: 2023-01-20

## 2022-03-25 RX ORDER — SEMAGLUTIDE 1.34 MG/ML
0.5 INJECTION, SOLUTION SUBCUTANEOUS
Qty: 1.5 ML | Refills: 11 | Status: SHIPPED | OUTPATIENT
Start: 2022-03-25 | End: 2022-03-25 | Stop reason: SDUPTHER

## 2022-03-25 RX ORDER — SEMAGLUTIDE 1.34 MG/ML
0.5 INJECTION, SOLUTION SUBCUTANEOUS
Qty: 1.5 ML | Refills: 11 | Status: SHIPPED | OUTPATIENT
Start: 2022-03-25 | End: 2022-11-14

## 2022-03-25 RX ORDER — METFORMIN HYDROCHLORIDE 500 MG/1
500 TABLET, EXTENDED RELEASE ORAL 2 TIMES DAILY
Qty: 180 TABLET | Refills: 3 | Status: SHIPPED | OUTPATIENT
Start: 2022-03-25 | End: 2022-11-03

## 2022-03-25 RX ORDER — ERGOCALCIFEROL 1.25 MG/1
50000 CAPSULE ORAL
Qty: 12 CAPSULE | Refills: 3 | Status: SHIPPED | OUTPATIENT
Start: 2022-03-25 | End: 2023-01-20

## 2022-03-25 ASSESSMENT — FIBROSIS 4 INDEX: FIB4 SCORE: 0.64

## 2022-03-25 ASSESSMENT — PATIENT HEALTH QUESTIONNAIRE - PHQ9: CLINICAL INTERPRETATION OF PHQ2 SCORE: 0

## 2022-03-25 NOTE — PROGRESS NOTES
CHIEF COMPLAINT: Patient is here for follow up of Type 2 Diabetes Mellitus    HPI:     Sybil Manuel is a 46 y.o. female with Type 2 Diabetes Mellitus here for follow up.    Labs from 3/25/2022 HbA1c is 4.9%    She is currently on Metformin extended release 1000 mg twice a day and Ozempic 0.5 mg weekly    She denies any side effects    She she did not have diabetic kidney disease at baseline but most recently her U ACR came back elevated 249 on March 2022 but she recently had a flu and we discussed that this could be a false positive elevation because of her illness  Her last serum creatinine was 0.62 with a GFR greater than 100 on March 2022    She has hyperlipidemia and is on Lipitor 20 mg daily  She denies muscle aches and muscle weakness or side effects  LDL cholesterol improved from 150 down to 122 on March 2022      Incidentally her potassium was elevated 5.7 on labs she admits that she is eating a lot of bananas    Her baseline TSH is normal        BG Diary:03/25/22  Patient forgot her meter    Weight patient reports greater than 10 pound weight loss in starting Ozempic    Diabetes Complications   Retinopathy: No known retinopathy.  Last eye exam: Reminded patient to get updated eye exam this year  Neuropathy: Denies paresthesias or numbness in hands or feet. Denies any foot wounds.  Exercise: Minimal.  Diet: Fair.  Patient's medications, allergies, and social histories were reviewed and updated as appropriate.    ROS:     CONS:     No fever, no chills   EYES:     No diplopia, no blurry vision   CV:           No chest pain, no palpitations   PULM:     No SOB, no cough, no hemoptysis.   GI:            No nausea, no vomiting, no diarrhea, no constipation   ENDO:     No polyuria, no polydipsia, no heat intolerance, no cold intolerance       Past Medical History:  Problem List:  2022-03: Microalbuminuria  2021-12: Bilateral calf pain  2021-11: Morning headache  2021-09: Dyslipidemia  2021-09: Long term (current)  "use of oral hypoglycemic drugs  2021-04: Palpitations  2019-08: Lymph node symptom  2018-07: Diabetes mellitus type 2, uncontrolled, with complications   ([facility])  2018-07: Elevated liver enzymes  2018-07: Controlled type 2 diabetes mellitus without complication,   without long-term current use of insulin ([facility])  2016-07: Seasonal allergies  2011-07: Acne  2010-09: Elevated SGOT  2010-09: Diabetes mellitus, type II ([facility])  2010-09: Vitamin D deficiency  2010-09: ASTHMA  2010-07: Hyperglycemia      Past Surgical History:  Past Surgical History:   Procedure Laterality Date   • PRIMARY C SECTION     • TUBAL COAGULATION LAPAROSCOPIC BILATERAL          Allergies:  Patient has no known allergies.     Social History:  Social History     Tobacco Use   • Smoking status: Never Smoker   • Smokeless tobacco: Never Used   Vaping Use   • Vaping Use: Never used   Substance Use Topics   • Alcohol use: Yes     Alcohol/week: 0.0 oz     Comment: 4 per year   • Drug use: No        Family History:   family history includes Arrythmia in her mother; Diabetes in her maternal grandmother; Heart Disease in her mother; Hyperlipidemia in her brother and mother; Hypertension in her mother.      PHYSICAL EXAM:   OBJECTIVE:  Vital signs: /80   Pulse 68   Ht 1.575 m (5' 2\")   Wt 60.3 kg (133 lb)   SpO2 100%   BMI 24.33 kg/m²   GENERAL: Well-developed, well-nourished in no apparent distress.   EYE:  No ocular asymmetry, PERRLA  HENT: Pink, moist mucous membranes.    NECK: No thyromegaly.   CARDIOVASCULAR:  No murmurs  LUNGS: Clear breath sounds  ABDOMEN: Soft, nontender   EXTREMITIES: No clubbing, cyanosis, or edema.   NEUROLOGICAL: No gross focal motor abnormalities   LYMPH: No cervical adenopathy palpated.   SKIN: No rashes, lesions.     Labs:  Lab Results   Component Value Date/Time    HBA1C 4.9 03/19/2022 11:22 AM        Lab Results   Component Value Date/Time    WBC 5.6 03/19/2022 11:22 AM    WBC 6.1 07/20/2010 09:00 AM    RBC 5.11 " 03/19/2022 11:22 AM    RBC 4.99 07/20/2010 09:00 AM    HEMOGLOBIN 13.3 03/19/2022 11:22 AM    MCV 82.4 03/19/2022 11:22 AM    MCV 90 07/20/2010 09:00 AM    MCH 26.0 (L) 03/19/2022 11:22 AM    MCH 28.5 07/20/2010 09:00 AM    MCHC 31.6 (L) 03/19/2022 11:22 AM    RDW 69.0 (H) 03/19/2022 11:22 AM    RDW 15.1 (H) 07/20/2010 09:00 AM    MPV 10.3 03/19/2022 11:22 AM       Lab Results   Component Value Date/Time    SODIUM 137 03/19/2022 11:22 AM    POTASSIUM 5.7 (H) 03/19/2022 11:22 AM    CHLORIDE 103 03/19/2022 11:22 AM    CO2 24 03/19/2022 11:22 AM    ANION 10.0 03/19/2022 11:22 AM    GLUCOSE 90 03/19/2022 11:22 AM    BUN 10 03/19/2022 11:22 AM    CREATININE 0.62 03/19/2022 11:22 AM    CREATININE 0.67 07/20/2010 09:00 AM    CALCIUM 9.9 03/19/2022 11:22 AM    ASTSGOT 20 03/19/2022 11:22 AM    ALTSGPT 14 03/19/2022 11:22 AM    TBILIRUBIN 0.2 03/19/2022 11:22 AM    ALBUMIN 5.0 (H) 03/19/2022 11:22 AM    TOTPROTEIN 8.1 03/19/2022 11:22 AM    GLOBULIN 3.1 03/19/2022 11:22 AM    AGRATIO 1.6 03/19/2022 11:22 AM       Lab Results   Component Value Date/Time    CHOLSTRLTOT 188 03/19/2022 1122    TRIGLYCERIDE 144 03/19/2022 1122    HDL 37 (A) 03/19/2022 1122     (H) 03/19/2022 1122       Lab Results   Component Value Date/Time    MALBCRT 249 (H) 03/19/2022 11:23 AM    MICROALBUR 25.5 03/19/2022 11:23 AM        Lab Results   Component Value Date/Time    TSHULTRASEN 1.150 03/19/2022 1122     Lab Results   Component Value Date/Time    FREEDIR 1.02 07/20/2010 0900     No results found for: FREET3  No results found for: THYSTIMIG        ASSESSMENT/PLAN:     1. Controlled type 2 diabetes mellitus without complication, without long-term current use of insulin (HCC)  Controlled  A1c is low at 4.9%  Reduce Metformin to 500 mg twice a day extended release  Continue Ozempic 0.5 mg weekly  Repeat some labs particularly potassium and microalbumin next week  See details below  Recommend continued exercise and diet  Recommend she get an  updated eye exam  Recommend follow-up in 1 year with repeat labs including serum creatinine, A1c, fasting lipid, TSH, and urine microalbumin    2. Dyslipidemia  Unstable  LDL cholesterol is still not at goal  Discussed with patient that the goal for diabetes is LDL cholesterol less than 100  Increase atorvastatin to 40 mg  Repeat fasting lipids with next labs    3. Hyperkalemia  Unstable repeat potassium levels next week patient was going to cut back on potassium rich foods    4. Microalbuminuria  Unstable  Suspect false elevation of urine microalbumin due to recent acute illness repeat urine microalbumin again next week    5. Long term (current) use of oral hypoglycemic drugs  Patient is on oral agents for type 2 diabetes management    6. Vitamin D deficiency  Continue ergocalciferol weekly   Continue monitoring  Repeat calcium vitamin D levels with next labs      No follow-ups on file.      This patient during there office visit today was started on a new medication.  Side effects of the new medication were discussed with the patient today in the office.     Thank you kindly for allowing me to participate in the diabetes care plan for this patient.    José Luis Ragsdale MD, SENAIT, ECNU  03/25/22    CC:   Pcp Pt States None

## 2022-03-25 NOTE — PROGRESS NOTES
RN-CDE Note    Subjective:   Endocrinology Clinic Progress Note  PCP: Pcp Pt States None    HPI:  Sybil Manuel is a 46 y.o. old patient who is seen today for review of Type 2 Diabetes.  Recent changes in health: Losing weight and states feeling good.  DM:   Last A1c:   Lab Results   Component Value Date/Time    HBA1C 4.9 03/19/2022 11:22 AM      Previous A1c was 6.4 on 9/24/21  A1C GOAL: < 7    Diabetes Medications:   Ozempic .5 mg weekly  Metformin  mg 2 BID      Exercise: Walking and hiking  Diet: protein shake for breakfast, lunch is chicken salad, and dinner is protein and vegetables.  Snacks on nuts.  Patient's body mass index is unknown because there is no height or weight on file. Exercise and nutrition counseling were performed at this visit.    Glucose monitoring frequency: Not testing    Hypoglycemic episodes: no  Last Retinal Exam: He saw Dr. Daley 1 year ago at [facility]  Daily Foot Exam: Yes   Foot Exam:  Monofilament: done  Monofilament testing with a 10 gram force: sensation intact: intact bilaterally  Visual Inspection: Feet without maceration, ulcers, fissures.  Pedal pulses: intact bilaterally   Lab Results   Component Value Date/Time    MALBCRT 249 (H) 03/19/2022 11:23 AM    MICROALBUR 25.5 03/19/2022 11:23 AM     She  reports that she has never smoked. She has never used smokeless tobacco.      Plan:     Discussed and educated on:   - All medications, side effects and compliance (discussed carefully)  - Annual eye examinations at Ophthalmology  - Home glucose monitoring emphasized  - Weight control and daily exercise    Recommended medication changes: Repeat labs.  She can decrease her Metformin  mg to BID

## 2022-04-26 ENCOUNTER — HOSPITAL ENCOUNTER (OUTPATIENT)
Facility: MEDICAL CENTER | Age: 47
End: 2022-04-26
Attending: FAMILY MEDICINE
Payer: COMMERCIAL

## 2022-04-26 ENCOUNTER — OFFICE VISIT (OUTPATIENT)
Dept: URGENT CARE | Facility: PHYSICIAN GROUP | Age: 47
End: 2022-04-26
Payer: COMMERCIAL

## 2022-04-26 VITALS
SYSTOLIC BLOOD PRESSURE: 118 MMHG | HEART RATE: 93 BPM | TEMPERATURE: 97.4 F | WEIGHT: 129 LBS | DIASTOLIC BLOOD PRESSURE: 80 MMHG | HEIGHT: 62 IN | BODY MASS INDEX: 23.74 KG/M2 | OXYGEN SATURATION: 97 % | RESPIRATION RATE: 16 BRPM

## 2022-04-26 DIAGNOSIS — J06.9 VIRAL URI WITH COUGH: ICD-10-CM

## 2022-04-26 DIAGNOSIS — J02.9 SORE THROAT: ICD-10-CM

## 2022-04-26 LAB
INT CON NEG: NEGATIVE
INT CON POS: POSITIVE
S PYO AG THROAT QL: NEGATIVE

## 2022-04-26 PROCEDURE — 99214 OFFICE O/P EST MOD 30 MIN: CPT | Performed by: FAMILY MEDICINE

## 2022-04-26 PROCEDURE — 0240U HCHG SARS-COV-2 COVID-19 NFCT DS RESP RNA 3 TRGT MIC: CPT

## 2022-04-26 PROCEDURE — 87880 STREP A ASSAY W/OPTIC: CPT | Performed by: FAMILY MEDICINE

## 2022-04-26 RX ORDER — FLUTICASONE PROPIONATE 50 MCG
1 SPRAY, SUSPENSION (ML) NASAL 2 TIMES DAILY
Qty: 16 G | Refills: 0 | Status: SHIPPED | OUTPATIENT
Start: 2022-04-26 | End: 2022-05-03

## 2022-04-26 RX ORDER — BENZONATATE 200 MG/1
200 CAPSULE ORAL 3 TIMES DAILY PRN
Qty: 45 CAPSULE | Refills: 0 | Status: SHIPPED | OUTPATIENT
Start: 2022-04-26 | End: 2022-06-27 | Stop reason: SDUPTHER

## 2022-04-26 ASSESSMENT — FIBROSIS 4 INDEX: FIB4 SCORE: 0.65

## 2022-04-26 NOTE — PROGRESS NOTES
"CC:  presents with Pharyngitis            Pharyngitis   This is a new problem. The current episode started in the past 4 days. The problem has been unchanged. There has been subj fever. The pain is mild. Associated symptoms include a runny nose and productive cough. Pertinent negatives include no abdominal pain,   diarrhea, headaches, shortness of breath or vomiting. no exposure to strep or mono.   has tried acetaminophen for the symptoms. The treatment provided mild relief.     Social History     Tobacco Use   • Smoking status: Never Smoker   • Smokeless tobacco: Never Used   Vaping Use   • Vaping Use: Never used   Substance Use Topics   • Alcohol use: Not Currently     Alcohol/week: 0.0 oz     Comment: 4 per year   • Drug use: No       Past Medical History:   Diagnosis Date   • Allergy    • Anxiety     Lorazepam rarely   • ASTHMA 9/3/2010   • Depression 2008    no antidepressant   • Diabetes mellitus type II 9/3/2010   • Elevated liver enzymes 2009   • GERD (gastroesophageal reflux disease)     occasional   • Hyperlipidemia    • Urinary tract infection, site not specified    • Vitamin D deficiency 2009       Review of Systems    HENT: Positive for sore throat  Respiratory: Negative for wheezing and shortness of breath.    Cardiovascular: Negative for chest pain.   Gastrointestinal: Negative for nausea, vomiting, abdominal pain and diarrhea.   Genitourinary: Negative.    Neurological: Negative for dizziness and headaches.   All other systems reviewed and are negative.         Objective:   /80 (BP Location: Right arm, Patient Position: Sitting, BP Cuff Size: Adult)   Pulse 93   Temp 36.3 °C (97.4 °F) (Temporal)   Resp 16   Ht 1.575 m (5' 2\")   Wt 58.5 kg (129 lb)   SpO2 97%         Physical Exam   Constitutional:   oriented to person, place, and time.  appears well-developed and well-nourished. No distress.   HENT:   Head: Normocephalic and atraumatic.   Right Ear: External ear normal.   Left Ear: " External ear normal.   Nose: Mucosal edema present. Right sinus exhibits no maxillary sinus tenderness and no frontal sinus tenderness. Left sinus exhibits no maxillary sinus tenderness and no frontal sinus tenderness.   Mouth/Throat: no posterior oropharyngeal exudate.   There is posterior oropharyngeal erythema present. No posterior oropharyngeal edema.   Tonsils 1+ bilaterally     Eyes: Conjunctivae and EOM are normal. Pupils are equal, round, and reactive to light. Right eye exhibits no discharge. Left eye exhibits no discharge. No scleral icterus.   Neck: Normal range of motion. Neck supple. No JVD present. No tracheal deviation present. No thyromegaly present.   Cardiovascular: Normal rate, regular rhythm, normal heart sounds and intact distal pulses.  Exam reveals no friction rub.    No murmur heard.  Pulmonary/Chest: Effort normal and breath sounds normal. No respiratory distress.   no wheezes.   no rales.    Musculoskeletal:  exhibits no edema.   Lymphadenopathy:    no cervical LAD  Neurological:   alert and oriented to person, place, and time.   Skin: Skin is warm and dry. No erythema.   Psychiatric:   normal mood and affect.   Nursing note and vitals reviewed.             Assessment/Plan:     1. Sore throat  Pt presents with sore throat and systemic symptoms (fever)    Rapid strep   negative.      - CoV-2 and Flu A/B by PCR (24 hour In-House): Collect NP swab in VTM; Future  - benzonatate (TESSALON) 200 MG capsule; Take 1 Capsule by mouth 3 times a day as needed for Cough.  Dispense: 45 Capsule; Refill: 0  - fluticasone (FLONASE) 50 MCG/ACT nasal spray; Administer 1 Spray into affected nostril(S) 2 times a day for 7 days.  Dispense: 16 g; Refill: 0     Follow up in one week if no improvement, sooner if symptoms worsen.

## 2022-04-27 LAB
FLUAV RNA SPEC QL NAA+PROBE: NEGATIVE
FLUBV RNA SPEC QL NAA+PROBE: NEGATIVE
SARS-COV-2 RNA RESP QL NAA+PROBE: NOTDETECTED
SPECIMEN SOURCE: NORMAL

## 2022-06-24 ENCOUNTER — TELEPHONE (OUTPATIENT)
Dept: SCHEDULING | Facility: IMAGING CENTER | Age: 47
End: 2022-06-24
Payer: COMMERCIAL

## 2022-06-27 ENCOUNTER — TELEMEDICINE (OUTPATIENT)
Dept: MEDICAL GROUP | Facility: MEDICAL CENTER | Age: 47
End: 2022-06-27
Payer: COMMERCIAL

## 2022-06-27 VITALS — HEIGHT: 62 IN | WEIGHT: 127 LBS | BODY MASS INDEX: 23.37 KG/M2

## 2022-06-27 DIAGNOSIS — U07.1 COVID-19: ICD-10-CM

## 2022-06-27 DIAGNOSIS — J06.9 VIRAL URI WITH COUGH: ICD-10-CM

## 2022-06-27 DIAGNOSIS — J02.9 SORE THROAT: ICD-10-CM

## 2022-06-27 DIAGNOSIS — R06.2 WHEEZING: ICD-10-CM

## 2022-06-27 PROCEDURE — 99213 OFFICE O/P EST LOW 20 MIN: CPT | Mod: 95 | Performed by: INTERNAL MEDICINE

## 2022-06-27 RX ORDER — BENZONATATE 200 MG/1
200 CAPSULE ORAL 3 TIMES DAILY PRN
Qty: 45 CAPSULE | Refills: 0 | Status: SHIPPED | OUTPATIENT
Start: 2022-06-27 | End: 2022-09-22

## 2022-06-27 RX ORDER — ALBUTEROL SULFATE 90 UG/1
2 AEROSOL, METERED RESPIRATORY (INHALATION) EVERY 6 HOURS PRN
Qty: 9 G | Refills: 5 | Status: SHIPPED | OUTPATIENT
Start: 2022-06-27 | End: 2022-09-12 | Stop reason: SDUPTHER

## 2022-06-27 RX ORDER — FLUTICASONE PROPIONATE 50 MCG
1 SPRAY, SUSPENSION (ML) NASAL DAILY
Qty: 16 G | Refills: 1 | Status: SHIPPED | OUTPATIENT
Start: 2022-06-27 | End: 2022-09-22 | Stop reason: SDUPTHER

## 2022-06-27 ASSESSMENT — ENCOUNTER SYMPTOMS
SPUTUM PRODUCTION: 1
NAUSEA: 0
COUGH: 1
DEPRESSION: 0
FEVER: 0
NERVOUS/ANXIOUS: 0
CHILLS: 0
HEARTBURN: 0
VOMITING: 0
SORE THROAT: 0

## 2022-06-27 ASSESSMENT — FIBROSIS 4 INDEX: FIB4 SCORE: 0.65

## 2022-06-27 NOTE — ASSESSMENT & PLAN NOTE
This is an ongoing problem, patient first tested positive on 6/15, then on 6/24/2022.  She continues to have lingering symptoms, that include runny nose, dry cough, occasionally with small amount of clear phlegm.  Her biggest concern is sinus congestion and pressure.   - start Flonase spray, [place] pot, hydration   - cont symptomatic therapy as needed: OTC Tylenol as needed for pain/headache/fever, antihistamine/decongestant as needed for runny nose/congestion. Call or return to clinic prn if these symptoms worsen or fail to improve as anticipated.  - advised to maintain adequate hydration, regular handwashing, facemask, social distancing  - advised to monitor pulse oximetry at home and to maintain O2 sat 90% or above  - may consider trying over the counter supplements to help with your symptoms: vitamin C 500mg two times daily, vitamin D3 2000 international units daily, zinc 75-100mg daily, melatonin 5-10mg before bedtime as needed  - encouraged patient to get COVID-19 vaccine once she recovers from acute infection  - strict ED precautions discussed if worsening mental status, chest pain, shortness of breath, lack of urination    Patient was advised on most recent CDC guidelines on self-isolation precautions and quarantine.    Provided patient with a work note to be excused until 6/29/2022.

## 2022-06-27 NOTE — LETTER
June 27, 2022    To Whom It May Concern:         This is confirmation that Sybil Manuel attended her appointment with Perla Dixon M.D. on 6/27/22.  Due to her current medical condition, she has to be excused from work and if  her symptoms improve-she would be safe to return to work on 06/29/2022.          If you have any questions please do not hesitate to call me at the phone number listed below.    Sincerely,          Perla Dixon M.D.  [phone]

## 2022-06-27 NOTE — PROGRESS NOTES
Subjective:     Diagnoses of COVID-19, Sore throat, Viral URI with cough, and Wheezing were pertinent to this visit.    HPI: Sybil is a pleasant 47 y.o. female who presents for virtual acute visit (she is a former patient of Minal Merritt and she has appointment to establish with new primary care physician on 7/15/2022)  This visit was conducted via Zoom using secure and encrypted videoconferencing technology.   The patient was in their home in the state [facility].    The patient's identity was confirmed and verbal consent was obtained for this virtual visit.     Problem   Covid-19    Date of symptoms onset: 6/15/2022   Symptoms: Headache, fatigue, runny nose, sinusitis, cough productive with small amount of clear sputum.  Fever: no fever   Date of COVID positive test: 06/15/2022, 6/24/2022   Medications tried: Dayquil, cold and flue OTC medicine, Tylenol   Home max temperature: normal   Home O2 saturation: has been able to tolerate PO but with reduced appetite. Normal urination.   COVID vaccination status: Moderna x3   Potential exposure: unknown          Past Medical History:   Diagnosis Date   • Allergy    • Anxiety     Lorazepam rarely   • ASTHMA 9/3/2010   • Depression 2008    no antidepressant   • Diabetes mellitus type II 9/3/2010   • Elevated liver enzymes 2009   • GERD (gastroesophageal reflux disease)     occasional   • Hyperlipidemia    • Urinary tract infection, site not specified    • Vitamin D deficiency 2009     Past Surgical History:   Procedure Laterality Date   • PRIMARY C SECTION     • TUBAL COAGULATION LAPAROSCOPIC BILATERAL       Family History   Problem Relation Age of Onset   • Heart Disease Mother         65 yo stents, pacemaker x2   • Hypertension Mother    • Hyperlipidemia Mother    • Arrythmia Mother    • Diabetes Maternal Grandmother    • Hyperlipidemia Brother      Social History     Tobacco Use   • Smoking status: Never Smoker   • Smokeless tobacco: Never Used   Vaping Use   • Vaping  "Use: Never used   Substance Use Topics   • Alcohol use: Not Currently     Alcohol/week: 0.0 oz     Comment: 4 per year   • Drug use: No     Current Outpatient Medications Ordered in Epic   Medication Sig Dispense Refill   • fluticasone (FLONASE) 50 MCG/ACT nasal spray Administer 1 Spray into affected nostril(S) every day. 16 g 1   • benzonatate (TESSALON) 200 MG capsule Take 1 Capsule by mouth 3 times a day as needed for Cough. 45 Capsule 0   • albuterol 108 (90 Base) MCG/ACT Aero Soln inhalation aerosol Inhale 2 Puffs every 6 hours as needed for Shortness of Breath. 9 g 5   • atorvastatin (LIPITOR) 40 MG Tab Take 1 Tablet by mouth every day. 90 Tablet 3   • metFORMIN ER (GLUCOPHAGE XR) 500 MG TABLET SR 24 HR Take 1 Tablet by mouth 2 times a day. 180 Tablet 3   • vitamin D2, Ergocalciferol, (DRISDOL) 1.25 MG (09462 UT) Cap capsule Take 1 Capsule by mouth every 7 days. 12 Capsule 3   • Semaglutide,0.25 or 0.5MG/DOS, (OZEMPIC, 0.25 OR 0.5 MG/DOSE,) 2 MG/1.5ML Solution Pen-injector Inject 0.5 mg under the skin every 7 days. 1 pen per month 1.5 mL 11   • LORATADINE Take 10 mg by mouth 1 time daily as needed.     • Multiple Vitamins-Minerals (MULTIVITAL PO) Take  by mouth.       No current Epic-ordered facility-administered medications on file.     Health Maintenance: deferred to PCP visit on 07/15/2022     Review of Systems   Constitutional: Positive for malaise/fatigue. Negative for chills and fever.   HENT: Negative for sore throat.    Respiratory: Positive for cough and sputum production (minimal).    Cardiovascular: Negative for chest pain.   Gastrointestinal: Negative for heartburn, nausea and vomiting.   Skin: Negative for rash.   Psychiatric/Behavioral: Negative for depression. The patient is not nervous/anxious.      Objective:     Exam:  Ht 1.575 m (5' 2\")   Wt 57.6 kg (127 lb)   BMI 23.23 kg/m²  Body mass index is 23.23 kg/m².    Physical Exam  Vitals reviewed: Limited physical exam due to virtual " appointment.   Constitutional:       Appearance: Normal appearance.   HENT:      Head: Normocephalic and atraumatic.   Pulmonary:      Effort: Pulmonary effort is normal.   Neurological:      General: No focal deficit present.      Mental Status: She is alert and oriented to person, place, and time.   Psychiatric:         Mood and Affect: Mood normal.         Behavior: Behavior normal.         Thought Content: Thought content normal.         Judgment: Judgment normal.       Labs: No labs or pertinent to the visit    Assessment & Plan:   Sybil  is a pleasant 47 y.o. female with the following -     Problem List Items Addressed This Visit     COVID-19     This is an ongoing problem, patient first tested positive on 6/15, then on 6/24/2022.  She continues to have lingering symptoms, that include runny nose, dry cough, occasionally with small amount of clear phlegm.  Her biggest concern is sinus congestion and pressure.   - start Flonase spray, [place] pot, hydration   - cont symptomatic therapy as needed: OTC Tylenol as needed for pain/headache/fever, antihistamine/decongestant as needed for runny nose/congestion. Call or return to clinic prn if these symptoms worsen or fail to improve as anticipated.  - advised to maintain adequate hydration, regular handwashing, facemask, social distancing  - advised to monitor pulse oximetry at home and to maintain O2 sat 90% or above  - may consider trying over the counter supplements to help with your symptoms: vitamin C 500mg two times daily, vitamin D3 2000 international units daily, zinc 75-100mg daily, melatonin 5-10mg before bedtime as needed  - encouraged patient to get COVID-19 vaccine once she recovers from acute infection  - strict ED precautions discussed if worsening mental status, chest pain, shortness of breath, lack of urination    Patient was advised on most recent CDC guidelines on self-isolation precautions and quarantine.    Provided patient with a work note to  be excused until 6/29/2022.             Other Visit Diagnoses     Sore throat        Relevant Medications    benzonatate (TESSALON) 200 MG capsule    Viral URI with cough        Relevant Medications    benzonatate (TESSALON) 200 MG capsule    Wheezing        Relevant Medications    albuterol 108 (90 Base) MCG/ACT Aero Soln inhalation aerosol        Return if symptoms worsen or fail to improve.    Please note that this dictation was created using voice recognition software. I have made every reasonable attempt to correct obvious errors, but I expect that there are errors of grammar and possibly content that I did not discover before finalizing the note.

## 2022-06-27 NOTE — PATIENT INSTRUCTIONS
Netipot 1-3 times a day with distilled water  may consider trying over the counter supplements to help with your symptoms: vitamin C 500mg two times daily, vitamin D3 2000 international units daily, zinc 75-100mg daily, melatonin 5-10mg before bedtime as needed

## 2022-06-27 NOTE — LETTER
[facility]  Pcp Pt States None  No address on file  Fax: [phone]   Authorization for Release/Disclosure of   Protected Health Information   Name: TREY MANUEL : 1975 SSN: [id number]   Address: [address] [place] [id number] Phone:    [phone] (home) [phone] (work)   I authorize the entity listed below to release/disclose the PHI below to:   [facility]/Pcp Pt States None and Perla Dixon M.D.   Provider or Entity Name:     Address   City, State, Zip   Phone:      Fax:     Reason for request: continuity of care   Information to be released:    [  ] LAST COLONOSCOPY,  including any PATH REPORT and follow-up  [  ] LAST FIT/COLOGUARD RESULT [  ] LAST DEXA  [  ] LAST MAMMOGRAM  [  ] LAST PAP  [  ] LAST LABS [  ] RETINA EXAM REPORT  [  ] IMMUNIZATION RECORDS  [  ] Release all info      [  ] Check here and initial the line next to each item to release ALL health information INCLUDING  _____ Care and treatment for drug and / or alcohol abuse  _____ HIV testing, infection status, or AIDS  _____ Genetic Testing    DATES OF SERVICE OR TIME PERIOD TO BE DISCLOSED: _____________  I understand and acknowledge that:  * This Authorization may be revoked at any time by you in writing, except if your health information has already been used or disclosed.  * Your health information that will be used or disclosed as a result of you signing this authorization could be re-disclosed by the recipient. If this occurs, your re-disclosed health information may no longer be protected by State or Federal laws.  * You may refuse to sign this Authorization. Your refusal will not affect your ability to obtain treatment.  * This Authorization becomes effective upon signing and will  on (date) __________.      If no date is indicated, this Authorization will  one (1) year from the signature date.    Name: Trey Manuel    Signature:   Date:     2022       PLEASE FAX REQUESTED  RECORDS BACK TO: [phone]

## 2022-07-15 PROBLEM — E11.69 HYPERLIPIDEMIA ASSOCIATED WITH TYPE 2 DIABETES MELLITUS (HCC): Status: ACTIVE | Noted: 2018-07-13

## 2022-07-15 PROBLEM — E78.5 HYPERLIPIDEMIA ASSOCIATED WITH TYPE 2 DIABETES MELLITUS (HCC): Status: ACTIVE | Noted: 2018-07-13

## 2022-09-22 ENCOUNTER — OFFICE VISIT (OUTPATIENT)
Dept: MEDICAL GROUP | Facility: MEDICAL CENTER | Age: 47
End: 2022-09-22
Payer: COMMERCIAL

## 2022-09-22 VITALS
WEIGHT: 133.6 LBS | OXYGEN SATURATION: 97 % | SYSTOLIC BLOOD PRESSURE: 112 MMHG | DIASTOLIC BLOOD PRESSURE: 82 MMHG | BODY MASS INDEX: 25.22 KG/M2 | HEIGHT: 61 IN | TEMPERATURE: 97.7 F | HEART RATE: 82 BPM

## 2022-09-22 DIAGNOSIS — E11.9 CONTROLLED TYPE 2 DIABETES MELLITUS WITHOUT COMPLICATION, WITHOUT LONG-TERM CURRENT USE OF INSULIN (HCC): ICD-10-CM

## 2022-09-22 DIAGNOSIS — E78.5 HYPERLIPIDEMIA ASSOCIATED WITH TYPE 2 DIABETES MELLITUS (HCC): ICD-10-CM

## 2022-09-22 DIAGNOSIS — E11.69 HYPERLIPIDEMIA ASSOCIATED WITH TYPE 2 DIABETES MELLITUS (HCC): ICD-10-CM

## 2022-09-22 PROBLEM — U07.1 COVID-19: Status: RESOLVED | Noted: 2022-06-27 | Resolved: 2022-09-22

## 2022-09-22 PROCEDURE — 99214 OFFICE O/P EST MOD 30 MIN: CPT | Performed by: INTERNAL MEDICINE

## 2022-09-22 RX ORDER — FLUTICASONE PROPIONATE 50 MCG
1 SPRAY, SUSPENSION (ML) NASAL DAILY
Qty: 16 G | Refills: 1 | Status: SHIPPED | OUTPATIENT
Start: 2022-09-22 | End: 2024-01-08 | Stop reason: SDUPTHER

## 2022-09-22 RX ORDER — MISOPROSTOL 200 UG/1
TABLET ORAL
COMMUNITY
Start: 2022-09-20 | End: 2023-01-20

## 2022-09-22 ASSESSMENT — ENCOUNTER SYMPTOMS
NAUSEA: 0
FEVER: 0
SHORTNESS OF BREATH: 0
ABDOMINAL PAIN: 0
CHILLS: 0
VOMITING: 0

## 2022-09-22 ASSESSMENT — FIBROSIS 4 INDEX: FIB4 SCORE: 0.65

## 2022-09-22 NOTE — ASSESSMENT & PLAN NOTE
Chronic, well controlled on current regimen of metformin and Ozempic.  Follow-up with endocrinology.

## 2022-09-22 NOTE — PROGRESS NOTES
Subjective:     Chief Complaint   Patient presents with    Establish Care     DM      Diagnoses of Diabetes mellitus type 2, uncontrolled, with complications (HCC), Controlled type 2 diabetes mellitus without complication, without long-term current use of insulin (HCC), and Hyperlipidemia associated with type 2 diabetes mellitus (HCC) were pertinent to this visit.    HISTORY OF THE PRESENT ILLNESS: Patient is a 47 y.o. female. This pleasant patient is here today to establish care. Her prior PCP was Esmer Merritt.    Problem   Controlled Type 2 Diabetes Mellitus Without Complication, Without Long-Term Current Use of Insulin (Hcc)    This is a chronic condition.  Managed by endocrinology Dr. Allen.    Current regimen: Metformin 500 mg twice daily, Ozempic 0.5 mg weekly.  Last A1c:   Lab Results   Component Value Date/Time    HBA1C 4.9 03/19/2022 1122    AVGLUC 94 03/19/2022 1122   Last Microalb/Cr ratio:   Lab Results   Component Value Date/Time    URCREAT 102.49 03/19/2022 1123    MICROALBUR 25.5 03/19/2022 1123    MALBCRT 249 (H) 03/19/2022 1123   Last diabetic foot exam: March 2022  Last retinal eye exam: Due, referred  ACEi/ARB: Not currently,  Statin: On atorvastatin 40 mg daily  LDL: 122  Nightly foot checks: advised        Hyperlipidemia Associated With Type 2 Diabetes Mellitus (Hcc)    Established diagnosis. Currently taking atorvastatin 40 mg daily, reports compliance and no side effects.   Lab Results   Component Value Date/Time    CHOLSTRLTOT 188 03/19/2022 11:22 AM    TRIGLYCERIDE 144 03/19/2022 11:22 AM    HDL 37 (A) 03/19/2022 11:22 AM     (H) 03/19/2022 11:22 AM                Past Medical History:   Diagnosis Date    Allergy     Anxiety     Lorazepam rarely    ASTHMA 9/3/2010    COVID-19 6/27/2022    Date of symptoms onset: 6/15/2022  Symptoms: Headache, fatigue, runny nose, sinusitis, cough productive with small amount of clear sputum. Fever: no fever  Date of COVID positive test: 06/15/2022,  6/24/2022  Medications tried: Dayquil, cold and flue OTC medicine, Tylenol  Home max temperature: normal  Home O2 saturation: has been able to tolerate PO but with reduced appetite. Normal urination.  CO    Depression 2008    no antidepressant    Diabetes mellitus type II 9/3/2010    Elevated liver enzymes 2009    GERD (gastroesophageal reflux disease)     occasional    Hyperlipidemia     Urinary tract infection, site not specified     Vitamin D deficiency 2009     Past Surgical History:   Procedure Laterality Date    PRIMARY C SECTION      TUBAL COAGULATION LAPAROSCOPIC BILATERAL       Family History   Problem Relation Age of Onset    Heart Disease Mother         65 yo stents, pacemaker x2    Hypertension Mother     Hyperlipidemia Mother     Arrythmia Mother     Diabetes Brother     Hyperlipidemia Brother     Diabetes Maternal Grandmother     Cancer Neg Hx      Social History     Tobacco Use    Smoking status: Never    Smokeless tobacco: Never   Vaping Use    Vaping Use: Never used   Substance Use Topics    Alcohol use: Never     Comment: 4 per year    Drug use: Never     Current Outpatient Medications Ordered in Epic   Medication Sig Dispense Refill    miSOPROStol (CYTOTEC) 200 MCG Tab INSERT 4 TABLETS VAGINALLY THE NIGHT BEFORE PROCEDURE      fluticasone (FLONASE) 50 MCG/ACT nasal spray Administer 1 Spray into affected nostril(S) every day. 16 g 1    albuterol 108 (90 Base) MCG/ACT Aero Soln inhalation aerosol Inhale 2 Puffs every 6 hours as needed for Shortness of Breath. 36 g 5    atorvastatin (LIPITOR) 40 MG Tab Take 1 Tablet by mouth every day. 90 Tablet 3    metFORMIN ER (GLUCOPHAGE XR) 500 MG TABLET SR 24 HR Take 1 Tablet by mouth 2 times a day. 180 Tablet 3    Semaglutide,0.25 or 0.5MG/DOS, (OZEMPIC, 0.25 OR 0.5 MG/DOSE,) 2 MG/1.5ML Solution Pen-injector Inject 0.5 mg under the skin every 7 days. 1 pen per month 1.5 mL 11    LORATADINE Take 10 mg by mouth 1 time daily as needed.      Multiple  "Vitamins-Minerals (MULTIVITAL PO) Take  by mouth.      vitamin D2, Ergocalciferol, (DRISDOL) 1.25 MG (35549 UT) Cap capsule Take 1 Capsule by mouth every 7 days. (Patient not taking: Reported on 9/22/2022) 12 Capsule 3     No current Epic-ordered facility-administered medications on file.     Health Maintenance: Pap smear done with OB/GYN, records requested.  Due for influenza and pneumonia vaccine-she will return for non-provider visit.    Review of Systems   Constitutional:  Negative for chills and fever.   Respiratory:  Negative for shortness of breath.    Cardiovascular:  Negative for chest pain.   Gastrointestinal:  Negative for abdominal pain, nausea and vomiting.     Objective:     Exam: /82 (BP Location: Left arm, Patient Position: Sitting, BP Cuff Size: Adult)   Pulse 82   Temp 36.5 °C (97.7 °F) (Temporal)   Ht 1.549 m (5' 1\")   Wt 60.6 kg (133 lb 9.6 oz)   SpO2 97%  Body mass index is 25.24 kg/m².    Physical Exam  Constitutional:       Appearance: Normal appearance. She is normal weight.   HENT:      Head: Normocephalic and atraumatic.      Nose: Nose normal.      Mouth/Throat:      Mouth: Mucous membranes are moist.      Pharynx: Oropharynx is clear.   Cardiovascular:      Pulses: Normal pulses.      Heart sounds: Normal heart sounds. No murmur heard.  Pulmonary:      Effort: Pulmonary effort is normal.      Breath sounds: Normal breath sounds.   Neurological:      General: No focal deficit present.      Mental Status: She is alert and oriented to person, place, and time.   Psychiatric:         Mood and Affect: Mood normal.         Behavior: Behavior normal.     Labs: Reviewed CBC, CMP, microalbumin, lipid panel from 3/19/2022    Assessment & Plan:   47 y.o. female with the following -    Problem List Items Addressed This Visit       Controlled type 2 diabetes mellitus without complication, without long-term current use of insulin (HCC) (Chronic)     Chronic, well controlled on current regimen " of metformin and Ozempic.  Follow-up with endocrinology.         Hyperlipidemia associated with type 2 diabetes mellitus (HCC) (Chronic)     Unclear if controlled, currently on atorvastatin 40 mg daily, due for lipid panel.          Other Visit Diagnoses       Diabetes mellitus type 2, uncontrolled, with complications (HCC)        Relevant Orders    CBC WITH DIFFERENTIAL    Referral for Retinal Screening Exam          Return in about 2 months (around 11/22/2022) for annual wellness visit.    Please note that this dictation was created using voice recognition software. I have made every reasonable attempt to correct obvious errors, but I expect that there are errors of grammar and possibly content that I did not discover before finalizing the note.

## 2022-09-22 NOTE — LETTER
[facility]  Perla Dixon M.D.  [address]  [place] [address]  Fax: [phone]   Authorization for Release/Disclosure of   Protected Health Information   Name: TREY VENTURA : 1975 SSN: [id number]   Address: [address] [place] [address] Phone:    [phone] (home) [phone] (work)   I authorize the entity listed below to release/disclose the PHI below to:   [facility]/Perla Dixon M.D. and Perla Dixon M.D.   Provider or Entity Name:  [facility]   Address   [place], [facility]   Phone:      Fax:     Reason for request: continuity of care   Information to be released:    [  ] LAST COLONOSCOPY,  including any PATH REPORT and follow-up  [  ] LAST FIT/COLOGUARD RESULT [  ] LAST DEXA  [  ] LAST MAMMOGRAM  [ x ] LAST PAP  [  ] LAST LABS [  ] RETINA EXAM REPORT  [  ] IMMUNIZATION RECORDS  [  ] Release all info      [  ] Check here and initial the line next to each item to release ALL health information INCLUDING  _____ Care and treatment for drug and / or alcohol abuse  _____ HIV testing, infection status, or AIDS  _____ Genetic Testing    DATES OF SERVICE OR TIME PERIOD TO BE DISCLOSED: _____________  I understand and acknowledge that:  * This Authorization may be revoked at any time by you in writing, except if your health information has already been used or disclosed.  * Your health information that will be used or disclosed as a result of you signing this authorization could be re-disclosed by the recipient. If this occurs, your re-disclosed health information may no longer be protected by State or Federal laws.  * You may refuse to sign this Authorization. Your refusal will not affect your ability to obtain treatment.  * This Authorization becomes effective upon signing and will  on (date) __________.      If no date is indicated, this Authorization will  one (1) year from the signature date.     Name: Sybil Manuel    Signature:   Date:     9/22/2022       PLEASE FAX REQUESTED RECORDS BACK TO: [phone]

## 2022-10-29 ENCOUNTER — HOSPITAL ENCOUNTER (OUTPATIENT)
Dept: LAB | Facility: MEDICAL CENTER | Age: 47
End: 2022-10-29
Attending: INTERNAL MEDICINE
Payer: COMMERCIAL

## 2022-10-29 LAB
BASOPHILS # BLD AUTO: 1.1 % (ref 0–1.8)
BASOPHILS # BLD: 0.07 K/UL (ref 0–0.12)
EOSINOPHIL # BLD AUTO: 0.13 K/UL (ref 0–0.51)
EOSINOPHIL NFR BLD: 2.1 % (ref 0–6.9)
ERYTHROCYTE [DISTWIDTH] IN BLOOD BY AUTOMATED COUNT: 42.6 FL (ref 35.9–50)
HCT VFR BLD AUTO: 41.3 % (ref 37–47)
HGB BLD-MCNC: 13 G/DL (ref 12–16)
IMM GRANULOCYTES # BLD AUTO: 0.02 K/UL (ref 0–0.11)
IMM GRANULOCYTES NFR BLD AUTO: 0.3 % (ref 0–0.9)
LYMPHOCYTES # BLD AUTO: 2.23 K/UL (ref 1–4.8)
LYMPHOCYTES NFR BLD: 35.7 % (ref 22–41)
MCH RBC QN AUTO: 27 PG (ref 27–33)
MCHC RBC AUTO-ENTMCNC: 31.5 G/DL (ref 33.6–35)
MCV RBC AUTO: 85.7 FL (ref 81.4–97.8)
MONOCYTES # BLD AUTO: 0.63 K/UL (ref 0–0.85)
MONOCYTES NFR BLD AUTO: 10.1 % (ref 0–13.4)
NEUTROPHILS # BLD AUTO: 3.16 K/UL (ref 2–7.15)
NEUTROPHILS NFR BLD: 50.7 % (ref 44–72)
NRBC # BLD AUTO: 0 K/UL
NRBC BLD-RTO: 0 /100 WBC
PLATELET # BLD AUTO: 404 K/UL (ref 164–446)
PMV BLD AUTO: 10.2 FL (ref 9–12.9)
RBC # BLD AUTO: 4.82 M/UL (ref 4.2–5.4)
WBC # BLD AUTO: 6.2 K/UL (ref 4.8–10.8)

## 2022-10-29 PROCEDURE — 85025 COMPLETE CBC W/AUTO DIFF WBC: CPT

## 2022-10-29 PROCEDURE — 36415 COLL VENOUS BLD VENIPUNCTURE: CPT

## 2022-11-02 DIAGNOSIS — E11.9 CONTROLLED TYPE 2 DIABETES MELLITUS WITHOUT COMPLICATION, WITHOUT LONG-TERM CURRENT USE OF INSULIN (HCC): ICD-10-CM

## 2022-11-03 RX ORDER — METFORMIN HYDROCHLORIDE 500 MG/1
TABLET, EXTENDED RELEASE ORAL
Qty: 360 TABLET | Refills: 0 | Status: SHIPPED | OUTPATIENT
Start: 2022-11-03 | End: 2023-03-08

## 2023-01-16 ENCOUNTER — TELEPHONE (OUTPATIENT)
Dept: ENDOCRINOLOGY | Facility: MEDICAL CENTER | Age: 48
End: 2023-01-16
Payer: COMMERCIAL

## 2023-01-16 DIAGNOSIS — Z79.84 LONG TERM (CURRENT) USE OF ORAL HYPOGLYCEMIC DRUGS: ICD-10-CM

## 2023-01-16 DIAGNOSIS — E55.9 VITAMIN D DEFICIENCY: ICD-10-CM

## 2023-01-16 DIAGNOSIS — E11.9 CONTROLLED TYPE 2 DIABETES MELLITUS WITHOUT COMPLICATION, WITHOUT LONG-TERM CURRENT USE OF INSULIN (HCC): ICD-10-CM

## 2023-01-16 DIAGNOSIS — E78.5 DYSLIPIDEMIA: ICD-10-CM

## 2023-01-16 RX ORDER — SEMAGLUTIDE 1.34 MG/ML
0.5 INJECTION, SOLUTION SUBCUTANEOUS
Qty: 1.5 ML | Refills: 2 | Status: SHIPPED | OUTPATIENT
Start: 2023-01-16 | End: 2023-03-24 | Stop reason: SDUPTHER

## 2023-01-17 ENCOUNTER — APPOINTMENT (OUTPATIENT)
Dept: RADIOLOGY | Facility: MEDICAL CENTER | Age: 48
End: 2023-01-17
Attending: EMERGENCY MEDICINE
Payer: COMMERCIAL

## 2023-01-17 ENCOUNTER — HOSPITAL ENCOUNTER (EMERGENCY)
Facility: MEDICAL CENTER | Age: 48
End: 2023-01-17
Attending: EMERGENCY MEDICINE
Payer: COMMERCIAL

## 2023-01-17 VITALS
OXYGEN SATURATION: 97 % | HEART RATE: 79 BPM | TEMPERATURE: 97.7 F | HEIGHT: 62 IN | BODY MASS INDEX: 26.37 KG/M2 | DIASTOLIC BLOOD PRESSURE: 75 MMHG | WEIGHT: 143.3 LBS | SYSTOLIC BLOOD PRESSURE: 134 MMHG | RESPIRATION RATE: 18 BRPM

## 2023-01-17 DIAGNOSIS — S62.101A CLOSED FRACTURE OF RIGHT WRIST, INITIAL ENCOUNTER: ICD-10-CM

## 2023-01-17 LAB — GLUCOSE BLD STRIP.AUTO-MCNC: 105 MG/DL (ref 65–99)

## 2023-01-17 PROCEDURE — 82962 GLUCOSE BLOOD TEST: CPT

## 2023-01-17 PROCEDURE — 96375 TX/PRO/DX INJ NEW DRUG ADDON: CPT | Mod: EDC

## 2023-01-17 PROCEDURE — 700105 HCHG RX REV CODE 258: Performed by: EMERGENCY MEDICINE

## 2023-01-17 PROCEDURE — 70450 CT HEAD/BRAIN W/O DYE: CPT

## 2023-01-17 PROCEDURE — 73100 X-RAY EXAM OF WRIST: CPT | Mod: RT

## 2023-01-17 PROCEDURE — 302874 HCHG BANDAGE ACE 2 OR 3"": Mod: EDC

## 2023-01-17 PROCEDURE — 700111 HCHG RX REV CODE 636 W/ 250 OVERRIDE (IP): Performed by: EMERGENCY MEDICINE

## 2023-01-17 PROCEDURE — 99285 EMERGENCY DEPT VISIT HI MDM: CPT | Mod: EDC

## 2023-01-17 PROCEDURE — A9270 NON-COVERED ITEM OR SERVICE: HCPCS | Performed by: EMERGENCY MEDICINE

## 2023-01-17 PROCEDURE — 99151 MOD SED SAME PHYS/QHP <5 YRS: CPT | Mod: EDC

## 2023-01-17 PROCEDURE — 73110 X-RAY EXAM OF WRIST: CPT | Mod: RT

## 2023-01-17 PROCEDURE — 700102 HCHG RX REV CODE 250 W/ 637 OVERRIDE(OP): Performed by: EMERGENCY MEDICINE

## 2023-01-17 PROCEDURE — 700101 HCHG RX REV CODE 250: Performed by: EMERGENCY MEDICINE

## 2023-01-17 PROCEDURE — 96376 TX/PRO/DX INJ SAME DRUG ADON: CPT | Mod: EDC

## 2023-01-17 PROCEDURE — 29125 APPL SHORT ARM SPLINT STATIC: CPT | Mod: EDC

## 2023-01-17 PROCEDURE — 94799 UNLISTED PULMONARY SVC/PX: CPT

## 2023-01-17 PROCEDURE — 25605 CLTX DST RDL FX/EPHYS SEP W/: CPT | Mod: EDC

## 2023-01-17 PROCEDURE — 96374 THER/PROPH/DIAG INJ IV PUSH: CPT | Mod: EDC

## 2023-01-17 RX ORDER — OXYCODONE HYDROCHLORIDE AND ACETAMINOPHEN 5; 325 MG/1; MG/1
1 TABLET ORAL EVERY 4 HOURS PRN
Qty: 15 TABLET | Refills: 0 | Status: SHIPPED | OUTPATIENT
Start: 2023-01-17 | End: 2023-01-20

## 2023-01-17 RX ORDER — METOCLOPRAMIDE HYDROCHLORIDE 5 MG/ML
10 INJECTION INTRAMUSCULAR; INTRAVENOUS ONCE
Status: COMPLETED | OUTPATIENT
Start: 2023-01-17 | End: 2023-01-17

## 2023-01-17 RX ORDER — METOCLOPRAMIDE 10 MG/1
10 TABLET ORAL 4 TIMES DAILY PRN
Qty: 20 TABLET | Refills: 0 | Status: SHIPPED | OUTPATIENT
Start: 2023-01-17 | End: 2023-01-20

## 2023-01-17 RX ORDER — MORPHINE SULFATE 4 MG/ML
4 INJECTION INTRAVENOUS ONCE
Status: COMPLETED | OUTPATIENT
Start: 2023-01-17 | End: 2023-01-17

## 2023-01-17 RX ORDER — ONDANSETRON 4 MG/1
4 TABLET, ORALLY DISINTEGRATING ORAL EVERY 8 HOURS PRN
Qty: 10 TABLET | Refills: 0 | Status: SHIPPED | OUTPATIENT
Start: 2023-01-17 | End: 2023-01-20

## 2023-01-17 RX ORDER — ONDANSETRON 2 MG/ML
4 INJECTION INTRAMUSCULAR; INTRAVENOUS ONCE
Status: COMPLETED | OUTPATIENT
Start: 2023-01-17 | End: 2023-01-17

## 2023-01-17 RX ORDER — LIDOCAINE HYDROCHLORIDE 20 MG/ML
20 INJECTION, SOLUTION INFILTRATION; PERINEURAL ONCE
Status: COMPLETED | OUTPATIENT
Start: 2023-01-17 | End: 2023-01-17

## 2023-01-17 RX ORDER — PROPOFOL 10 MG/ML
200 INJECTION, EMULSION INTRAVENOUS ONCE
Status: COMPLETED | OUTPATIENT
Start: 2023-01-17 | End: 2023-01-17

## 2023-01-17 RX ORDER — DIAZEPAM 5 MG/ML
5 INJECTION, SOLUTION INTRAMUSCULAR; INTRAVENOUS ONCE
Status: COMPLETED | OUTPATIENT
Start: 2023-01-17 | End: 2023-01-17

## 2023-01-17 RX ORDER — OXYCODONE HYDROCHLORIDE AND ACETAMINOPHEN 5; 325 MG/1; MG/1
1 TABLET ORAL ONCE
Status: COMPLETED | OUTPATIENT
Start: 2023-01-17 | End: 2023-01-17

## 2023-01-17 RX ORDER — SODIUM CHLORIDE 9 MG/ML
1000 INJECTION, SOLUTION INTRAVENOUS ONCE
Status: COMPLETED | OUTPATIENT
Start: 2023-01-17 | End: 2023-01-17

## 2023-01-17 RX ADMIN — ONDANSETRON 4 MG: 2 INJECTION INTRAMUSCULAR; INTRAVENOUS at 14:35

## 2023-01-17 RX ADMIN — SODIUM CHLORIDE 1000 ML: 9 INJECTION, SOLUTION INTRAVENOUS at 15:30

## 2023-01-17 RX ADMIN — LIDOCAINE HYDROCHLORIDE 5 ML: 20 INJECTION, SOLUTION INFILTRATION; PERINEURAL at 11:45

## 2023-01-17 RX ADMIN — PROPOFOL 30 MG: 10 INJECTION, EMULSION INTRAVENOUS at 13:06

## 2023-01-17 RX ADMIN — OXYCODONE AND ACETAMINOPHEN 1 TABLET: 5; 325 TABLET ORAL at 17:18

## 2023-01-17 RX ADMIN — MORPHINE SULFATE 4 MG: 4 INJECTION INTRAVENOUS at 10:51

## 2023-01-17 RX ADMIN — PROPOFOL 20 MG: 10 INJECTION, EMULSION INTRAVENOUS at 13:08

## 2023-01-17 RX ADMIN — METOCLOPRAMIDE 10 MG: 5 INJECTION, SOLUTION INTRAMUSCULAR; INTRAVENOUS at 17:02

## 2023-01-17 RX ADMIN — MORPHINE SULFATE 4 MG: 4 INJECTION INTRAVENOUS at 12:04

## 2023-01-17 RX ADMIN — ONDANSETRON 4 MG: 2 INJECTION INTRAMUSCULAR; INTRAVENOUS at 12:35

## 2023-01-17 ASSESSMENT — LIFESTYLE VARIABLES
EVER HAD A DRINK FIRST THING IN THE MORNING TO STEADY YOUR NERVES TO GET RID OF A HANGOVER: NO
HAVE PEOPLE ANNOYED YOU BY CRITICIZING YOUR DRINKING: NO
EVER FELT BAD OR GUILTY ABOUT YOUR DRINKING: NO
HAVE YOU EVER FELT YOU SHOULD CUT DOWN ON YOUR DRINKING: NO
TOTAL SCORE: 0
DO YOU DRINK ALCOHOL: NO
TOTAL SCORE: 0
CONSUMPTION TOTAL: INCOMPLETE
DOES PATIENT WANT TO STOP DRINKING: NO
TOTAL SCORE: 0

## 2023-01-17 ASSESSMENT — PAIN DESCRIPTION - PAIN TYPE
TYPE: ACUTE PAIN
TYPE: ACUTE PAIN

## 2023-01-17 ASSESSMENT — FIBROSIS 4 INDEX: FIB4 SCORE: 0.64

## 2023-01-17 NOTE — ED NOTES
"UE Volar splint applied to pt's R arm assisted by HANNAH Alonzo using 3\" plaster Minimum of three layers of padding applied with four layers over bony prominences. Distal CMS intact before and after splint application.   "

## 2023-01-17 NOTE — LETTER
"  FORM C-4:  EMPLOYEE’S CLAIM FOR COMPENSATION/ REPORT OF INITIAL TREATMENT  EMPLOYEE’S CLAIM - PROVIDE ALL INFORMATION REQUESTED   First Name Sybil Last Name Kaleb Birthdate 1975  Sex female Claim Number   Home Address [address]             Zip [address]                                   Age  47 y.o. 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 (if applicable) Walking towards passenger seat    How did this injury or occupational disease occur? Be specific and answer in detail. Use additional sheet if necessary)  Walking towards passenger seat to get my belongings.   If you believe that you have an occupational disease, when did you first have knowledge of the disability and it relationship to your employment? N/A Witnesses to the Accident     Nature of Injury or Occupational Disease  Workers' Compensation Part(s) of Body Injured or Affected  Wrist (R) and Hand (R), N/A, N/A    I CERTIFY THAT THE ABOVE IS TRUE AND CORRECT TO THE BEST OF MY KNOWLEDGE AND THAT I HAVE PROVIDED THIS INFORMATION IN ORDER TO OBTAIN THE BENEFITS OF [place]  I " HEREBY AUTHORIZE ANY PHYSICIAN, CHIROPRACTOR, SURGEON, PRACTITIONER, OR OTHER PERSON, ANY HOSPITAL, INCLUDING [facility] OR [facility], ANY MEDICAL SERVICE ORGANIZATION, ANY INSURANCE COMPANY, OR OTHER INSTITUTION OR ORGANIZATION TO RELEASE TO EACH OTHER, ANY MEDICAL OR OTHER INFORMATION, INCLUDING BENEFITS PAID OR PAYABLE, PERTINENT TO THIS INJURY OR DISEASE, EXCEPT INFORMATION RELATIVE TO DIAGNOSIS, TREATMENT AND/OR COUNSELING FOR AIDS, PSYCHOLOGICAL CONDITIONS, ALCOHOL OR CONTROLLED SUBSTANCES, FOR WHICH I MUST GIVE SPECIFIC AUTHORIZATION.  A PHOTOSTAT OF THIS AUTHORIZATION SHALL BE AS VALID AS THE ORIGINAL.  Date                                      Place                                                                             Employee’s Signature   THIS REPORT MUST BE COMPLETED AND MAILED WITHIN 3 WORKING DAYS OF TREATMENT   Place [facility], EMERGENCY DEPT                       Name of Facility [facility]   Date  1/17/2023 Diagnosis  No diagnosis found. Is there evidence the injured employee was under the influence of alcohol and/or another controlled substance at the time of accident?   Hour  12:36 PM Description of Injury or Disease       Treatment     Have you advised the patient to remain off work five days or more?             X-Ray Findings    If Yes   From Date    To Date      From information given by the employee, together with medical evidence, can you directly connect this injury or occupational disease as job incurred?   If No, is employee capable of: Full Duty    Modified Duty      Is additional medical care by a physician indicated?   If Modified Duty, Specify any Limitations / Restrictions       Do you know of any previous injury or disease contributing to this condition or occupational disease?      Date 1/17/2023 Print Doctor’s Name Nannette Alonzo I certify the employer’s copy of this form was mailed on:   Address [address]  [place] [address]  [phone] INSURER’S USE ONLY   Provider’s Tax ID Number   Telephone Dept: [phone]    Doctor’s Signature   Degree        Form C-4 (rev.10/07)                                                                         BRIEF DESCRIPTION OF RIGHTS AND BENEFITS  ([place]ursuant to [place] 616[place])    Notice of Injury or Occupational Disease (Incident Report Form C-1): If an injury or occupational disease (OD) arises out of and in the course of employment, you must provide written notice to your employer as soon as practicable, but no later than 7 days after the accident or OD. Your employer shall maintain a sufficient supply of the required forms.    Claim for Compensation (Form C-4): If medical treatment is sought, the form C-4 is available at the place of initial treatment. A completed \"Claim for Compensation\" (Form C-4) must be filed within 90 days after an accident or OD. The treating physician or chiropractor must, within 3 working days after treatment, complete and mail to the employer, the employer's insurer and third-party , the Claim for Compensation.    Medical Treatment: If you require medical treatment for your on-the-job injury or OD, you may be required to select a physician or chiropractor from a list provided by your workers’ compensation insurer, if it has contracted with an Organization for Managed Care (MCO) or Preferred Provider Organization (PPO) or providers of health care. If your employer has not entered into a contract with an MCO or PPO, you may select a physician or chiropractor from the Panel of Physicians and Chiropractors. Any medical costs related to your industrial injury or OD will be paid by your insurer.    Temporary Total Disability (TTD): If your doctor has certified that you are unable to work for a period of at least 5 consecutive days, or 5 cumulative days in a 20-day period, or places restrictions on you that your employer does not accommodate, " you may be entitled to TTD compensation.    Temporary Partial Disability (TPD): If the wage you receive upon reemployment is less than the compensation for TTD to which you are entitled, the insurer may be required to pay you TPD compensation to make up the difference. TPD can only be paid for a maximum of 24 months.    Permanent Partial Disability (PPD): When your medical condition is stable and there is an indication of a PPD as a result of your injury or OD, within 30 days, your insurer must arrange for an evaluation by a rating physician or chiropractor to determine the degree of your PPD. The amount of your PPD award depends on the date of injury, the results of the PPD evaluation, your age and wage.    Permanent Total Disability (PTD): If you are medically certified by a treating physician or chiropractor as permanently and totally disabled and have been granted a PTD status by your insurer, you are entitled to receive monthly benefits not to exceed 66 2/3% of your average monthly wage. The amount of your PTD payments is subject to reduction if you previously received a lump-sum PPD award.    Vocational Rehabilitation Services: You may be eligible for vocational rehabilitation services if you are unable to return to the job due to a permanent physical impairment or permanent restrictions as a result of your injury or occupational disease.    Transportation and Per [place]ce Reimbursement: You may be eligible for travel expenses and per dulce associated with medical treatment.    Reopening: You may be able to reopen your claim if your condition worsens after claim closure.     Appeal Process: If you disagree with a written determination issued by the insurer or the insurer does not respond to your request, you may appeal to the Department of Administration, , by following the instructions contained in your determination letter. You must appeal the determination within 70 days from the date of the  determination letter at [address], [address], [place], [place] [address], or [address], [address], [place], [place] [address]. If you disagree with the  decision, you may appeal to the Department of Administration, . You must file your appeal within 30 days from the date of the  decision letter at [address], [address], [place], [place] [address], or [address], [address], [place], [place] [address]. If you disagree with a decision of an , you may file a petition for judicial review with the District Court. You must do so within 30 days of the Appeal Officer’s decision. You may be represented by an  at your own expense or you may contact the [facility] for possible representation.    [place]  for Injured Workers (NAIW): If you disagree with a  decision, you may request that NAIW represent you without charge at an  Hearing. For information regarding denial of benefits, you may contact the [facility] at: [address], [address], [place] [address], [phone], or [address], [place] [address], [phone]    To File a Complaint with the Division: If you wish to file a complaint with the  of the Division of Industrial Relations (DIR),  please contact the Workers’ Compensation Section, [address], [address], [place], [place] [address], telephone [phone], or [address], [address], [place], [place] [address], telephone [phone].    For assistance with Workers’ Compensation Issues: You may contact the [facility] Office for Consumer Health Assistance, [address], [address], [place], [place] [address], Toll Free 1-240.642.6902, Web site: http://[facility].[place].gov/Programs/MAKENZIE E-mail: makenzie@[email or web address].[place].gov  D-2 (rev. 10/20)              __________________________________________________________________                                     _________________            Employee Name / Signature                                                                                                                            Date

## 2023-01-17 NOTE — ED NOTES
1300: RT to bedside setting up end tidal, all neccessary equipment at bedside.  1304: Time out called, all in agreement  1306: 1st dose of propofol given by MD, pt talking with provider, VS stable  1308: 2 dose of propofol given, approaching appropriate sedation level  1311: reduction completed by ERP, splint application in process  1315: Pt drowsy but wakes voice, VS stable, splint application continued  1319: Sedation end, ERP leaves room, pt alert and orient, remains drowsy  1331: RN remains at bedside with pt and family. Pt talking with family, alert and oriented.   1332: RN leaves room, family aware to keep pt on gurney and to remain NPO. Pt removed from oxygen.

## 2023-01-17 NOTE — ED TRIAGE NOTES
Chief Complaint   Patient presents with    GLF     States walked around car, slipped on ice, fell onto R wrist. States hit head but denies headache or any other injuries.     Wrist Pain     Right wrist pain, strong radial pulse, swelling noted.      Pt ambulates to triage for above. CMS intact. Pt has ice pack to area upon arrival.

## 2023-01-17 NOTE — ED NOTES
Pt instructed to keep the splint clean and dry. Pt informed of signs of poor perfusion and instructed to loosen ace bandages without removing the splint if any signs are present. Pt instructed to return to the ED if symptoms of poor perfusion don't resolve. No questions from Pt. Pt experiencing numbness in her thumb and first two digits, Ace wrap loosened per ERP. Numbness remains, ERP aware and OK for discharge.

## 2023-01-17 NOTE — ED PROVIDER NOTES
ER Provider Note    Scribed for Nannette Alonzo M.d. by Gino White. 1/17/2023  10:15 AM    Primary Care Provider: Perla Dixon M.D.    CHIEF COMPLAINT  Chief Complaint   Patient presents with    GLF     States walked around car, slipped on ice, fell onto R wrist. States hit head but denies headache or any other injuries.     Wrist Pain     Right wrist pain, strong radial pulse, swelling noted.      LIMITATION TO HISTORY   Select: : None    HPI/ROS  OUTSIDE HISTORIAN(S):  None    EXTERNAL RECORDS REVIEWED  None    Sybil Manuel is a 47 y.o. female who presents to the ED for evaluation of a moderate ground level fall onset prior to arrival. The patient states that earlier today while in the parking lot at her job she walked around her vehicle to grab her lunch and slipped on some ice. She reports primarily striking her right wrist and head during this incident. Sybil was ultimately prompted to visit the ED over concerns of possible fracture. Associated symptoms include head strike, right forearm pain, right wrist pain, and right wrist swelling. The patient denies any loss of consciousness, vomiting, headache, head pain, back pain, lower extremity pain, or loss of sensation to the right upper extremity.     PAST MEDICAL HISTORY  Past Medical History:   Diagnosis Date    Allergy     Anxiety     Lorazepam rarely    ASTHMA 9/3/2010    COVID-19 6/27/2022    Date of symptoms onset: 6/15/2022  Symptoms: Headache, fatigue, runny nose, sinusitis, cough productive with small amount of clear sputum. Fever: no fever  Date of COVID positive test: 06/15/2022, 6/24/2022  Medications tried: Dayquil, cold and flue OTC medicine, Tylenol  Home max temperature: normal  Home O2 saturation: has been able to tolerate PO but with reduced appetite. Normal urination.  CO    Depression 2008    no antidepressant    Diabetes mellitus type II 9/3/2010    Elevated liver enzymes 2009    GERD (gastroesophageal reflux disease)      "occasional    Hyperlipidemia     Urinary tract infection, site not specified     Vitamin D deficiency 2009       SURGICAL HISTORY  Past Surgical History:   Procedure Laterality Date    PRIMARY C SECTION      TUBAL COAGULATION LAPAROSCOPIC BILATERAL         FAMILY HISTORY  Family History   Problem Relation Age of Onset    Heart Disease Mother         67 yo stents, pacemaker x2    Hypertension Mother     Hyperlipidemia Mother     Arrythmia Mother     Diabetes Brother     Hyperlipidemia Brother     Diabetes Maternal Grandmother     Cancer Neg Hx        SOCIAL HISTORY   reports that she has never smoked. She has never used smokeless tobacco. She reports that she does not drink alcohol and does not use drugs.    CURRENT MEDICATIONS  Previous Medications    ALBUTEROL 108 (90 BASE) MCG/ACT AERO SOLN INHALATION AEROSOL    Inhale 2 Puffs every 6 hours as needed for Shortness of Breath.    ATORVASTATIN (LIPITOR) 40 MG TAB    Take 1 Tablet by mouth every day.    FLUTICASONE (FLONASE) 50 MCG/ACT NASAL SPRAY    Administer 1 Spray into affected nostril(S) every day.    LORATADINE    Take 10 mg by mouth 1 time daily as needed.    METFORMIN ER (GLUCOPHAGE XR) 500 MG TABLET SR 24 HR    Take 2 tablets by mouth twice daily    MISOPROSTOL (CYTOTEC) 200 MCG TAB    INSERT 4 TABLETS VAGINALLY THE NIGHT BEFORE PROCEDURE    MULTIPLE VITAMINS-MINERALS (MULTIVITAL PO)    Take  by mouth.    SEMAGLUTIDE,0.25 OR 0.5MG/DOS, (OZEMPIC, 0.25 OR 0.5 MG/DOSE,) 2 MG/1.5ML SOLUTION PEN-INJECTOR    Inject 0.5 mg under the skin every 7 days. Please make an appointment for future refills    VITAMIN D2, ERGOCALCIFEROL, (DRISDOL) 1.25 MG (18068 UT) CAP CAPSULE    Take 1 Capsule by mouth every 7 days.       ALLERGIES  Patient has no known allergies.    PHYSICAL EXAM  /76   Pulse 76   Temp 36.4 °C (97.5 °F) (Temporal)   Resp 18   Ht 1.575 m (5' 2\")   Wt 65 kg (143 lb 4.8 oz)   SpO2 100%   BMI 26.21 kg/m²   Constitutional: Alert in no apparent " distress. Well appearing  HENT: Normocephalic, Atraumatic, Bilateral external ears normal. Nose normal.   Eyes:  Conjunctiva normal, non-icteric.   Lungs: Non-labored respirations clear to auscultation  Heart: Regular rate and rhythm no murmurs rubs or gallops  Skin: Warm, Dry, No erythema, No rash.   Neurologic: Alert, Grossly non-focal.   Psychiatric: Affect normal, Judgment normal, Mood normal, Appears appropriate and not intoxicated.   Extremities: Obvious deformity to the right wrist.     DIAGNOSTIC STUDIES & PROCEDURES    Joint Reduction Procedure Note    Indication: fracture    Consent: The patient was counseled regarding the procedure, it's indications, risks, potential complications and alternatives and any questions were answered. Consent was obtained.    Procedure: The pre-reduction exam showed distal perfusion to be normal. The patient was placed in the appropriate position. Anesthesia/pain control was obtained using conscious sedation -SEE CONSCIOUS SEDATION NOTE FOR DETAILS. Reduction of the right wrist was performed by direct traction. Post reduction films were obtained and revealed partial but satisfactory reduction. A post-reduction exam revealed distal perfusion & neurologic function to be normal. The affected area was immobilized with a volar wrist splint.    The patient tolerated the procedure well.    Complications: None    Conscious Sedation Procedure Note    Indication: fracture dislocation    Consent: I have discussed with the patient and/or the patient representative the indication, alternatives, and the possible risks and/or complications of the planned procedure and the anesthesia methods. The patient and/or patient representative appear to understand and agree to proceed.    Physician Involvement: The attending physician was present and supervising this procedure.    Pre-Sedation Documentation and Exam: I have personally completed a history, physical exam & review of systems for this  patient (see notes).  Airway Assessment: normal  f3  Prior History of Anesthesia Complications: none    ASA Classification: Class 1 - A normal healthy patient    Sedation/ Anesthesia Plan: intravenous sedation    Medications Used: propofol 50 intravenously    Monitoring and Safety: The patient was placed on a cardiac monitor and vital signs, pulse oximetry and level of consciousness were continuously evaluated throughout the procedure. The patient was closely monitored until recovery from the medications was complete and the patient had returned to baseline status. Respiratory therapy was on standby at all times during the procedure.      (The following sections must be completed)  Post-Sedation Vital Signs: Vital signs were reviewed and were stable after the procedure (see flow sheet for vitals)            Intraservice Time: Greater than 10 minutes    Post-Sedation Exam: Lungs: clear           Complications: none    I provided both the sedation and procedure, a nurse was present at the bedside for the entire procedure.     Hematoma Block  Risks and benefits of digital nerve block were discussed and the patient gave verbal consent to proceed with hematoma block. Chlorhexidine was used to clean the overlying area, and using aseptic technique, hematoma block was performed with 2% lidocaine without epinephrine. Patient tolerated this procedure well with sufficient anesthesia.    Radiology:   The attending Emergency Physician has independently interpreted the diagnostic imaging associated with this visit and is awaiting the final reading from the radiologist, which will be displayed below.    DX-WRIST-LIMITED 2- RIGHT   Final Result      1.  Improved alignment of comminuted distal radial fracture following closed reduction.   2.  Ulnar styloid fracture is not well seen due to overlying splint.      DX-WRIST-COMPLETE 3+ RIGHT   Final Result         Acute comminuted and displaced fracture of the distal radius with  intra-articular extension.      Acute mildly displaced ulnar styloid fracture.           COURSE & MEDICAL DECISION MAKING    10:15 AM - Patient seen and evaluated at bedside. Patient will be treated with Morphine 4 mg for her symptoms. Ordered DX-Wrist-Complete 3+ Right to evaluate. She understands and agrees to the plan of care. Differential diagnoses include but are not limited to: Fracture vs contusion.     ED Observation Status? Yes; I am placing the patient in to an observation status due to a diagnostic uncertainty as well as therapeutic intensity. Patient placed in observation status at 10:20 AM, 1/17/2023.     Observation plan is as follows: Pending imaging and likely Orthopedic consult.     Upon Reevaluation, the patient's condition has: Improved; and will be discharged.    Patient discharged from ED Observation status at 2:32 (Time) on 1/17/23.     11:11 AM - Imaging was interpreted by me and is consistent with a right radius fracture. Formal read is pending.     11:18 AM - Paged Orthopedics.    11:30 AM - I discussed the patient's case and the above findings with  (Orthopedics) who relayed to reduce the fracture in the ED, place a temporary splint, and have the patient follow up in clinic for surgery in the near future.    11:44 AM - Digital nerve block performed by me as noted above. Explained my consult with Dr. Navas as noted above.     12:05 PM - Ordered Valium 5 mg to treat the patient.    12:33 PM - Ordered Zofran 4 mg to treat the patient.     12:38 PM - Patient was reevaluated at bedside. Explained radiology results with the patient and informed them that they are consistent with an acute comminuted and displace fracture of the distal radius with intra-articular extension and acute mildly displaced ulnar styloid fracture. Discussed performing a joint reduction with conscious sedation.     1:03 PM - Conscious sedation and joint reduction procedures performed by me as noted above.      1:22 PM - Ordered DX-Wrist-Limited 2-Right to further evaluate the patient.     2:32 PM   - Discussed plan for discharge; I advised the patient to follow-up with Dr. Navas (Orthopedics) as soon as possible, and to return to the [facility] ED with any new or worsening symptoms. Patient was given the opportunity for questions. I addressed all questions or concerns at this time and they verbalize agreement to the plan of care.     INITIAL ASSESSMENT AND PLAN  Care Narrative: This is a 47-year-old female that presents with a fall and right wrist fracture.  She was initially placed in finger traps to help reduce it without successful reduction.  Ultimately she was sedated and closed reduction was performed with some improvement of fracture alignment.  She will need follow-up with orthopedics for definitive management.  She will be given pain medication and nausea medication as she has had some significant nausea after the sedation and I am concerned she may have some ongoing nausea when on pain medications.  She is agreeable to this plan.    ADDITIONAL PROBLEM LIST AND DISPOSITION    Problem #1: Acute right wrist fracture  Problem #2: Post procedure nausea    I have discussed management of the patient with the following physicians and MEETA's: Dr. Nvaas (Orthopedics)    Discussion of management with other [facility] or appropriate source(s): RT At bedside for a conscious sedation.        In prescribing controlled substances to this patient, I certify that I have obtained and reviewed the medical history of Sybil Manuel. I have also made a good adam effort to obtain applicable records from other providers who have treated the patient and records did not demonstrate any increased risk of substance abuse that would prevent me from prescribing controlled substances.     I have conducted a physical exam and documented it. I have reviewed Ms. Manuel’s prescription history as maintained by the [place] Prescription Monitoring  Program.     I have assessed the patient’s risk for abuse, dependency, and addiction using the validated Opioid Risk Tool available at https://www.mdcalc.com/fetivv-zgzd-yptf-ort-narcotic-abuse.     Given the above, I believe the benefits of controlled substance therapy outweigh the risks. The reasons for prescribing controlled substances include non-narcotic, oral analgesic alternatives have been inadequate for pain control. Accordingly, I have discussed the risk and benefits, treatment plan, and alternative therapies with the patient.   I reviewed prescription monitoring program for patient's narcotic use before prescribing a scheduled drug.The patient will not drink alcohol nor drive with prescribed medications.} The patient will return for new or worsening symptoms and is stable at the time of discharge.      DISPOSITION:  Patient will be discharged home in stable condition.    FOLLOW UP:  Jero Navas M.D.  [address] [facility] [id number] [phone]    Schedule an appointment as soon as possible for a visit       [facility], Emergency Dept  [address] [facility] [facility] [id number] [phone]    Return for worsening pain, swelling or other concerns      OUTPATIENT MEDICATIONS:  New Prescriptions    ONDANSETRON (ZOFRAN ODT) 4 MG TABLET DISPERSIBLE    Take 1 Tablet by mouth every 8 hours as needed for Nausea/Vomiting.    OXYCODONE-ACETAMINOPHEN (PERCOCET) 5-325 MG TAB    Take 1 Tablet by mouth every four hours as needed for Moderate Pain for up to 3 days.         FINAL IMPRESSION   1. Closed fracture of right wrist, initial encounter        2    Joint reduction procedure performed by me  3    Conscious sedation procedure performed by me  4    Digital block performed by me     Gino HARDEN (Hedy), am scribing for, and in the presence of, Nannette Alonzo M.D..    Electronically signed by: Gino White (Hedy), 1/17/2023    Nannette HARDEN M.D. personally performed the  services described in this documentation, as scribed by Gino White in my presence, and it is both accurate and complete.    The note accurately reflects work and decisions made by me.  Nannette Alonzo M.D.  1/17/2023  2:34 PM

## 2023-01-17 NOTE — ED PROVIDER NOTES
Emergency Physician Note    I assumed care of this patient at shift change.  Please see initial emergency physician note documented by Dr. Alonzo for the full details of this patient's initial presentation.  At this time, she continues to have nausea and vomiting after procedural sedation.  She has not responded to the initial dose of antiemetics, and requires ongoing monitoring and treatment.  For this reason ED observation status initiated 1/17/2023 at 2:45 PM.    While recovering from propofol, she continued to have some nausea and vomiting.  When she slipped on the ice she did strike her head, has a very mild headache at this time.  Due to head injury with persistent nausea and vomiting after receiving Zofran, I ordered a CT of the head for further evaluation.  This resulted negative for evidence of acute process.    She continued to have nausea and vomiting after second dose of Zofran was given.  She was given 10 mg of Reglan.    After receiving Reglan her nausea significantly improved.  It is possible that the nausea and vomiting was actually brought on by the morphine that was administered previously.  That this may also represent a reaction to the propofol, as well as recovery from those medications.  Ms. Manuel was feeling quite well, however had 1 further episode of vomiting prior to leaving the emergency department.  I did offer admission, however she states that she is feeling well, and would prefer to return home as her nausea and vomiting are continuing to improve.  She has maintain oxygen saturations of 99% on continuous pulse oximetry, and does have good support at home, as well as family who will be able to make sure she returns to the emergency department if nausea does not continue to improve.  She is requesting a prescription for Reglan which have provided.    At 1/17/2023, 7:12 PM patient is discharged from ED observation having stabilized with improved symptoms.    Plan at this time is for  discharge home, she will follow-up with orthopedics as previously recommended.

## 2023-01-17 NOTE — ED NOTES
Patient  concerned about patients blood sugar and requested to test. Patient blood sugar 105 mg/dL, ERP aware.

## 2023-01-17 NOTE — LETTER
"  FORM C-4:  EMPLOYEE’S CLAIM FOR COMPENSATION/ REPORT OF INITIAL TREATMENT  EMPLOYEE’S CLAIM - PROVIDE ALL INFORMATION REQUESTED   First Name Sybil Last Name Kaleb Birthdate 1975  Sex female Claim Number   Home Address [address]             [place] [address]                                   Age  47 y.o. 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 (if applicable) Walking towards passenger seat    How did this injury or occupational disease occur? Be specific and answer in detail. Use additional sheet if necessary)  Walking towards passenger seat to get my belongings.   If you believe that you have an occupational disease, when did you first have knowledge of the disability and it relationship to your employment? N/A Witnesses to the Accident  N/A   Nature of Injury or Occupational Disease  Workers' Compensation Part(s) of Body Injured or Affected  Wrist (R) and Hand (R), N/A, N/A    I CERTIFY THAT THE ABOVE IS TRUE AND CORRECT TO THE BEST OF MY KNOWLEDGE AND THAT I HAVE PROVIDED THIS INFORMATION IN ORDER TO OBTAIN THE BENEFITS OF [place]  I HEREBY AUTHORIZE " ANY PHYSICIAN, CHIROPRACTOR, SURGEON, PRACTITIONER, OR OTHER PERSON, ANY HOSPITAL, INCLUDING [facility] OR [facility], ANY MEDICAL SERVICE ORGANIZATION, ANY INSURANCE COMPANY, OR OTHER INSTITUTION OR ORGANIZATION TO RELEASE TO EACH OTHER, ANY MEDICAL OR OTHER INFORMATION, INCLUDING BENEFITS PAID OR PAYABLE, PERTINENT TO THIS INJURY OR DISEASE, EXCEPT INFORMATION RELATIVE TO DIAGNOSIS, TREATMENT AND/OR COUNSELING FOR AIDS, PSYCHOLOGICAL CONDITIONS, ALCOHOL OR CONTROLLED SUBSTANCES, FOR WHICH I MUST GIVE SPECIFIC AUTHORIZATION.  A PHOTOSTAT OF THIS AUTHORIZATION SHALL BE AS VALID AS THE ORIGINAL.  Date  01/17/2023   Place  [facility]    Employee’s Signature   THIS REPORT MUST BE COMPLETED AND MAILED WITHIN 3 WORKING DAYS OF TREATMENT   Place [facility], EMERGENCY DEPT                       Name of Facility [facility]   Date  1/17/2023 Diagnosis  (S62.101A) Closed fracture of right wrist, initial encounter Is there evidence the injured employee was under the influence of alcohol and/or another controlled substance at the time of accident?   Hour  2:21 PM Description of Injury or Disease  Closed fracture of right wrist, initial encounter No   Treatment  Ice pain medication follow-up with orthopedics for definitive management  Have you advised the patient to remain off work five days or more?         Yes   X-Ray Findings  Positive If Yes   From Date  1/17/2023 To Date  1/25/2023   From information given by the employee, together with medical evidence, can you directly connect this injury or occupational disease as job incurred? Yes If No, is employee capable of: Full Duty  No Modified Duty  Yes   Is additional medical care by a physician indicated? Yes If Modified Duty, Specify any Limitations / Restrictions   Patient can go back to work when cleared by orthopedics no right-handed work   Do you know of any previous injury or disease contributing to this condition or  "occupational disease? No    Date 1/17/2023 Print Doctor’s Name ClintnoNannette I certify the employer’s copy of this form was mailed on:   Address [address]  [place] [address] [phone] INSURER’S USE ONLY   Provider’s Tax ID Number   Telephone Dept: [phone]    Doctor’s Signature NANNETTE Walker M.D. Degree  M.D.      Form C-4 (rev.10/07)                                                                         BRIEF DESCRIPTION OF RIGHTS AND BENEFITS  (Pursuant to [place] 616C.050)    Notice of Injury or Occupational Disease (Incident Report Form C-1): If an injury or occupational disease (OD) arises out of and in the course of employment, you must provide written notice to your employer as soon as practicable, but no later than 7 days after the accident or OD. Your employer shall maintain a sufficient supply of the required forms.    Claim for Compensation (Form C-4): If medical treatment is sought, the form C-4 is available at the place of initial treatment. A completed \"Claim for Compensation\" (Form C-4) must be filed within 90 days after an accident or OD. The treating physician or chiropractor must, within 3 working days after treatment, complete and mail to the employer, the employer's insurer and third-party , the Claim for Compensation.    Medical Treatment: If you require medical treatment for your on-the-job injury or OD, you may be required to select a physician or chiropractor from a list provided by your workers’ compensation insurer, if it has contracted with an Organization for Managed Care (MCO) or Preferred Provider Organization (PPO) or providers of health care. If your employer has not entered into a contract with an MCO or PPO, you may select a physician or chiropractor from the Panel of Physicians and Chiropractors. Any medical costs related to your industrial injury or OD will be paid by your insurer.    Temporary Total Disability (TTD): If your doctor has certified " that you are unable to work for a period of at least 5 consecutive days, or 5 cumulative days in a 20-day period, or places restrictions on you that your employer does not accommodate, you may be entitled to TTD compensation.    Temporary Partial Disability (TPD): If the wage you receive upon reemployment is less than the compensation for TTD to which you are entitled, the insurer may be required to pay you TPD compensation to make up the difference. TPD can only be paid for a maximum of 24 months.    Permanent Partial Disability (PPD): When your medical condition is stable and there is an indication of a PPD as a result of your injury or OD, within 30 days, your insurer must arrange for an evaluation by a rating physician or chiropractor to determine the degree of your PPD. The amount of your PPD award depends on the date of injury, the results of the PPD evaluation, your age and wage.    Permanent Total Disability (PTD): If you are medically certified by a treating physician or chiropractor as permanently and totally disabled and have been granted a PTD status by your insurer, you are entitled to receive monthly benefits not to exceed 66 2/3% of your average monthly wage. The amount of your PTD payments is subject to reduction if you previously received a lump-sum PPD award.    Vocational Rehabilitation Services: You may be eligible for vocational rehabilitation services if you are unable to return to the job due to a permanent physical impairment or permanent restrictions as a result of your injury or occupational disease.    Transportation and Per Dulce Reimbursement: You may be eligible for travel expenses and per dulce associated with medical treatment.    Reopening: You may be able to reopen your claim if your condition worsens after claim closure.     Appeal Process: If you disagree with a written determination issued by the insurer or the insurer does not respond to your request, you may appeal to the  Department of Administration, , by following the instructions contained in your determination letter. You must appeal the determination within 70 days from the date of the determination letter at [address], [address], [place], [place] [address], or [address], [address], [place], [place] [address]. If you disagree with the  decision, you may appeal to the Department of Administration, . You must file your appeal within 30 days from the date of the  decision letter at [address], [address], [place], [place] [address], or [address], [address], [place], [place] [address]. If you disagree with a decision of an , you may file a petition for judicial review with the District Court. You must do so within 30 days of the Appeal Officer’s decision. You may be represented by an  at your own expense or you may contact the [facility] for possible representation.    [place]  for Injured Workers (NAIW): If you disagree with a  decision, you may request that NAIW represent you without charge at an  Hearing. For information regarding denial of benefits, you may contact the [facility] at: [address], [address], [place] [address], [phone], or [address], [address], [place] [address], [phone]    To File a Complaint with the Division: If you wish to file a complaint with the  of the Division of Industrial Relations (DIR),  please contact the Workers’ Compensation Section, [address], [address], [place], [place] [address], telephone [phone], or [address], [address], [place], [place] [address], telephone [phone].    For assistance with Workers’ Compensation Issues: You may contact the [facility] Office for Consumer Health Assistance, [address], [address], [place], [place] [address], Toll Free  2-035-338-3935, Web site: http://[facility].[place].gov/Programs/MAKENZIE E-mail: makenzie@[facility].[place].gov  D-2 (rev. 10/20)              __________________________________________________________________                                    _________________            Employee Name / Signature                                                                                                                            Date

## 2023-01-17 NOTE — ED NOTES
Numbness in first 3 digits, cap refill remains less than 3 seconds. ERP notified and instructed to loosen ace wrap. ER trevor Moncada at bedside loosening ace wrap. Patient reports feeling has not returned to first 3 digits.

## 2023-01-17 NOTE — ED NOTES
Pt had second round of emesis. Dr. Real taking over care of pt. Dr. Real to bedside discussing POC for CT. Pt remains alert and oriented, reports she is just tired.  remains at bedside with pt.

## 2023-01-17 NOTE — ED NOTES
Pt reports hitting her head with the fall today, denies LOC or vomiting. Pt does not have any marks to the head. Pt with obvious deformity of the right wrist, CMS+, strong radial pulse present. Pt is aware to remain NPO.

## 2023-01-18 NOTE — ED NOTES
"[facility]    Discharge instructions, which include signs and symptoms to monitor patient for, as well as detailed information regarding closed reduction provided.  All questions and concerns addressed at this time.      Prescription for Zofran, Reglan, Percocet provided to patient. Patient  signed consent for controlled substances.       Patient leaves ER in no apparent distress. This RN provided education regarding returning to the ER for any new concerns or changes in patient's condition.      /75   Pulse 79   Temp 36.5 °C (97.7 °F)   Resp 18   Ht 1.575 m (5' 2\")   Wt 65 kg (143 lb 4.8 oz)   SpO2 97%   Breastfeeding No   BMI 26.21 kg/m²     "

## 2023-01-18 NOTE — ED NOTES
Patient reports feeling nauseous. Patient looks pale and is sitting at 90, and states that it helps to sit up.

## 2023-01-19 PROBLEM — S52.501A CLOSED FRACTURE OF RIGHT DISTAL RADIUS: Status: ACTIVE | Noted: 2023-01-19

## 2023-01-21 ENCOUNTER — HOSPITAL ENCOUNTER (OUTPATIENT)
Dept: LAB | Facility: MEDICAL CENTER | Age: 48
End: 2023-01-21
Attending: INTERNAL MEDICINE
Payer: COMMERCIAL

## 2023-01-21 DIAGNOSIS — E78.5 DYSLIPIDEMIA: ICD-10-CM

## 2023-01-21 DIAGNOSIS — E55.9 VITAMIN D DEFICIENCY: ICD-10-CM

## 2023-01-21 DIAGNOSIS — Z79.84 LONG TERM (CURRENT) USE OF ORAL HYPOGLYCEMIC DRUGS: ICD-10-CM

## 2023-01-21 DIAGNOSIS — E11.9 CONTROLLED TYPE 2 DIABETES MELLITUS WITHOUT COMPLICATION, WITHOUT LONG-TERM CURRENT USE OF INSULIN (HCC): ICD-10-CM

## 2023-01-21 LAB
25(OH)D3 SERPL-MCNC: 19 NG/ML (ref 30–100)
ALBUMIN SERPL BCP-MCNC: 4.3 G/DL (ref 3.2–4.9)
ALBUMIN/GLOB SERPL: 1.3 G/DL
ALP SERPL-CCNC: 67 U/L (ref 30–99)
ALT SERPL-CCNC: 12 U/L (ref 2–50)
ANION GAP SERPL CALC-SCNC: 10 MMOL/L (ref 7–16)
AST SERPL-CCNC: 15 U/L (ref 12–45)
BILIRUB SERPL-MCNC: 0.3 MG/DL (ref 0.1–1.5)
BUN SERPL-MCNC: 9 MG/DL (ref 8–22)
CALCIUM ALBUM COR SERPL-MCNC: 9.1 MG/DL (ref 8.5–10.5)
CALCIUM SERPL-MCNC: 9.3 MG/DL (ref 8.5–10.5)
CHLORIDE SERPL-SCNC: 103 MMOL/L (ref 96–112)
CHOLEST SERPL-MCNC: 175 MG/DL (ref 100–199)
CO2 SERPL-SCNC: 25 MMOL/L (ref 20–33)
CREAT SERPL-MCNC: 0.51 MG/DL (ref 0.5–1.4)
CREAT UR-MCNC: 36.43 MG/DL
GFR SERPLBLD CREATININE-BSD FMLA CKD-EPI: 115 ML/MIN/1.73 M 2
GLOBULIN SER CALC-MCNC: 3.2 G/DL (ref 1.9–3.5)
GLUCOSE SERPL-MCNC: 106 MG/DL (ref 65–99)
HDLC SERPL-MCNC: 37 MG/DL
LDLC SERPL CALC-MCNC: 112 MG/DL
MICROALBUMIN UR-MCNC: <1.2 MG/DL
MICROALBUMIN/CREAT UR: NORMAL MG/G (ref 0–30)
POTASSIUM SERPL-SCNC: 4.8 MMOL/L (ref 3.6–5.5)
PROT SERPL-MCNC: 7.5 G/DL (ref 6–8.2)
SODIUM SERPL-SCNC: 138 MMOL/L (ref 135–145)
T4 FREE SERPL-MCNC: 1.13 NG/DL (ref 0.93–1.7)
TRIGL SERPL-MCNC: 129 MG/DL (ref 0–149)
TSH SERPL DL<=0.005 MIU/L-ACNC: 1.25 UIU/ML (ref 0.38–5.33)

## 2023-01-21 PROCEDURE — 82306 VITAMIN D 25 HYDROXY: CPT

## 2023-01-21 PROCEDURE — 82043 UR ALBUMIN QUANTITATIVE: CPT

## 2023-01-21 PROCEDURE — 84439 ASSAY OF FREE THYROXINE: CPT

## 2023-01-21 PROCEDURE — 36415 COLL VENOUS BLD VENIPUNCTURE: CPT

## 2023-01-21 PROCEDURE — 80061 LIPID PANEL: CPT

## 2023-01-21 PROCEDURE — 82570 ASSAY OF URINE CREATININE: CPT

## 2023-01-21 PROCEDURE — 84443 ASSAY THYROID STIM HORMONE: CPT

## 2023-01-21 PROCEDURE — 80053 COMPREHEN METABOLIC PANEL: CPT

## 2023-03-08 ENCOUNTER — OFFICE VISIT (OUTPATIENT)
Dept: MEDICAL GROUP | Facility: MEDICAL CENTER | Age: 48
End: 2023-03-08
Payer: COMMERCIAL

## 2023-03-08 VITALS
TEMPERATURE: 98.1 F | BODY MASS INDEX: 24.46 KG/M2 | SYSTOLIC BLOOD PRESSURE: 110 MMHG | OXYGEN SATURATION: 98 % | DIASTOLIC BLOOD PRESSURE: 60 MMHG | WEIGHT: 132.94 LBS | HEIGHT: 62 IN | HEART RATE: 86 BPM

## 2023-03-08 DIAGNOSIS — E11.9 CONTROLLED TYPE 2 DIABETES MELLITUS WITHOUT COMPLICATION, WITHOUT LONG-TERM CURRENT USE OF INSULIN (HCC): Chronic | ICD-10-CM

## 2023-03-08 DIAGNOSIS — Z00.00 VISIT FOR ANNUAL HEALTH EXAMINATION: Primary | ICD-10-CM

## 2023-03-08 DIAGNOSIS — E55.9 VITAMIN D DEFICIENCY: ICD-10-CM

## 2023-03-08 DIAGNOSIS — Z23 NEED FOR VACCINATION: ICD-10-CM

## 2023-03-08 DIAGNOSIS — Z11.59 ENCOUNTER FOR HEPATITIS C SCREENING TEST FOR LOW RISK PATIENT: ICD-10-CM

## 2023-03-08 DIAGNOSIS — E11.69 HYPERLIPIDEMIA ASSOCIATED WITH TYPE 2 DIABETES MELLITUS (HCC): ICD-10-CM

## 2023-03-08 DIAGNOSIS — E04.1 THYROID NODULE: ICD-10-CM

## 2023-03-08 DIAGNOSIS — E78.5 HYPERLIPIDEMIA ASSOCIATED WITH TYPE 2 DIABETES MELLITUS (HCC): ICD-10-CM

## 2023-03-08 DIAGNOSIS — E11.9 CONTROLLED TYPE 2 DIABETES MELLITUS WITHOUT COMPLICATION, WITHOUT LONG-TERM CURRENT USE OF INSULIN (HCC): ICD-10-CM

## 2023-03-08 PROCEDURE — 99396 PREV VISIT EST AGE 40-64: CPT | Mod: 25 | Performed by: INTERNAL MEDICINE

## 2023-03-08 PROCEDURE — 90677 PCV20 VACCINE IM: CPT | Performed by: INTERNAL MEDICINE

## 2023-03-08 PROCEDURE — 90471 IMMUNIZATION ADMIN: CPT | Performed by: INTERNAL MEDICINE

## 2023-03-08 RX ORDER — FLUTICASONE PROPIONATE 50 MCG
50 SPRAY, SUSPENSION (ML) NASAL DAILY
COMMUNITY
Start: 2022-09-22 | End: 2023-03-08

## 2023-03-08 RX ORDER — ERGOCALCIFEROL 1.25 MG/1
50000 CAPSULE ORAL
Qty: 12 CAPSULE | Refills: 0 | Status: SHIPPED | OUTPATIENT
Start: 2023-03-08 | End: 2023-03-24 | Stop reason: SDUPTHER

## 2023-03-08 RX ORDER — METFORMIN HYDROCHLORIDE 500 MG/1
TABLET, EXTENDED RELEASE ORAL
Qty: 360 TABLET | Refills: 0 | Status: SHIPPED | OUTPATIENT
Start: 2023-03-08 | End: 2023-03-24 | Stop reason: SDUPTHER

## 2023-03-08 RX ORDER — PRAVASTATIN SODIUM 20 MG
20 TABLET ORAL NIGHTLY
Qty: 30 TABLET | Refills: 11 | Status: SHIPPED | OUTPATIENT
Start: 2023-03-08 | End: 2023-03-24

## 2023-03-08 ASSESSMENT — FIBROSIS 4 INDEX: FIB4 SCORE: 0.51

## 2023-03-08 ASSESSMENT — PATIENT HEALTH QUESTIONNAIRE - PHQ9: CLINICAL INTERPRETATION OF PHQ2 SCORE: 0

## 2023-03-08 NOTE — LETTER
[facility]  Perla Dixon M.D.  [address]  [place] [address]  Fax: [phone]   Authorization for Release/Disclosure of   Protected Health Information   Name: TREY VENTURA : 1975 SSN: [id number]   Address: [address] [place] [address] Phone:    [phone] (home) [phone] (work)   I authorize the entity listed below to release/disclose the PHI below to:   [facility]/Perla Dixon M.D. and Perla Dixon M.D.   Provider or Entity Name:  [facility]    Address   City, State, [place]   Phone:      Fax:     Reason for request: continuity of care   Information to be released:    [ x ] LAST COLONOSCOPY,  including any PATH REPORT and follow-up  [  ] LAST FIT/COLOGUARD RESULT [  ] LAST DEXA  [  ] LAST MAMMOGRAM  [  ] LAST PAP  [  ] LAST LABS [  ] RETINA EXAM REPORT  [  ] IMMUNIZATION RECORDS  [  ] Release all info      [  ] Check here and initial the line next to each item to release ALL health information INCLUDING  _____ Care and treatment for drug and / or alcohol abuse  _____ HIV testing, infection status, or AIDS  _____ Genetic Testing    DATES OF SERVICE OR TIME PERIOD TO BE DISCLOSED: _____________  I understand and acknowledge that:  * This Authorization may be revoked at any time by you in writing, except if your health information has already been used or disclosed.  * Your health information that will be used or disclosed as a result of you signing this authorization could be re-disclosed by the recipient. If this occurs, your re-disclosed health information may no longer be protected by State or Federal laws.  * You may refuse to sign this Authorization. Your refusal will not affect your ability to obtain treatment.  * This Authorization becomes effective upon signing and will  on (date) __________.      If no date is indicated, this Authorization will  one (1) year from the signature date.    Name: Trey  Kaleb  Signature: Date:   3/8/2023     PLEASE FAX REQUESTED RECORDS BACK TO: [phone]

## 2023-03-08 NOTE — PROGRESS NOTES
Subjective:     Chief Complaint   Patient presents with    Annual Exam    Requesting Labs     Thyroid nodule     Medication Refill     HPI: Sybil Manuel is a 47 y.o. female who presents for annual exam. She is feeling well and denies any complaints  Problem   Thyroid Nodule    1/28/2022 5:17 PM     HISTORY/REASON FOR EXAM:  Neck swelling     TECHNIQUE/EXAM DESCRIPTION:  Ultrasound of the soft tissues of the head and neck.     COMPARISON:  None     FINDINGS:  The thyroid gland is heterogeneous.  Vascularity is normal.     The right lobe of the thyroid gland measures 1.54 cm x 5.10 cm x 1.17 cm.  The left lobe of the thyroid gland measures 1.23 cm x 4.28 cm x 1.88 cm.  The isthmus measures 0.29 cm.     Nodules >= 1cm:  1     Nodule #1  Location:  Left  mid  Size:  1.12 x 0.91 x 0.72 cm  Composition:  Solid-2  Echogenicity:  Hypoechoic-2  Shape:  Wider than tall-0  Margins:  Ill defined-0  Echogenic Foci:  None-0     ACR TIRADS points/category:  4 - TR4 - Moderately Suspicious     There is a 1.8 x 0.9 x 0.5 cm lymph node within the lower left soft tissues of the neck. No definite echogenic hilus identified.     IMPRESSION:     1.  1.1 cm nodule left lobe of the thyroid     ACR TI-RADS Recommendations  TR4 (1-1.4cm) - follow up ultrasound in 1,2,3 and 5 years     Controlled Type 2 Diabetes Mellitus Without Complication, Without Long-Term Current Use of Insulin (Hcc)    This is a chronic condition.  Managed by endocrinology Dr. Allen.    Current regimen: Metformin 500 mg twice daily, Ozempic 0.5 mg weekly.  Last A1c:     Lab Results   Component Value Date/Time    HBA1C 5.9 (A) 01/23/2023 09:00 AM        Last Microalb/Cr ratio:   Lab Results   Component Value Date/Time    URCREAT 102.49 03/19/2022 1123    MICROALBUR 25.5 03/19/2022 1123    MALBCRT 249 (H) 03/19/2022 1123   Last diabetic foot exam: March 2023  Last retinal eye exam: 02/2023  ACEi/ARB: Not currently,  Statin: Currently, start pravastatin.  LDL:  122  Nightly foot checks: advised        Hyperlipidemia Associated With Type 2 Diabetes Mellitus (Hcc)    Patient was previously prescribed atorvastatin, taking it anymore.  She is interested in alternative medication.         Ob-Gyn/ History:    Patient has GYN provider: Dr. Claudia Gates   /Para:  3/3   Last Pap Smear:  2020. No history of abnormal pap smears.  Gyn Surgery:  tubal coagulation   Current Contraceptive Method:  no . She is currently sexually active with one partner   Last menstrual period: 3/6/2023 .  Periods regular. Heavy  She does take OTC analgesics for cramps.  No significant bloating/fluid retention, pelvic pain, or dyspareunia. No vaginal discharge  Urinary incontinence: sometimes with cough and laughing     Health Maintenance:   Cholesterol Screenin2023   Diabetes Screening: has DM, controlled  Diet: low carb diet, home cooked, loves rices, rare fast food, some chocholate    Exercise: not currently   Substance Abuse: no   Safe in relationship.   Seat belts, bike helmet, gun safety discussed.  Sun protection used.    Cancer screening  Colorectal Cancer Screening:   Lung Cancer Screening: n/a   Cervical Cancer Screening: 2020  Breast Cancer Screening: cancaelled appt     Infectious disease screening/Immunizations  --STI Screening: not interested   --Practices safe sex.  --HIV Screening: not interested   --Hepatitis C Screening: ordered  --Immunizations:    Influenza:     Tetanus: 2019   Pneumococcal: 2nd dose today    COVID-19: x3     Problem   Thyroid Nodule    2022 5:17 PM     HISTORY/REASON FOR EXAM:  Neck swelling     TECHNIQUE/EXAM DESCRIPTION:  Ultrasound of the soft tissues of the head and neck.     COMPARISON:  None     FINDINGS:  The thyroid gland is heterogeneous.  Vascularity is normal.     The right lobe of the thyroid gland measures 1.54 cm x 5.10 cm x 1.17 cm.  The left lobe of the thyroid gland measures 1.23 cm x 4.28 cm x 1.88  cm.  The isthmus measures 0.29 cm.     Nodules >= 1cm:  1     Nodule #1  Location:  Left  mid  Size:  1.12 x 0.91 x 0.72 cm  Composition:  Solid-2  Echogenicity:  Hypoechoic-2  Shape:  Wider than tall-0  Margins:  Ill defined-0  Echogenic Foci:  None-0     ACR TIRADS points/category:  4 - TR4 - Moderately Suspicious     There is a 1.8 x 0.9 x 0.5 cm lymph node within the lower left soft tissues of the neck. No definite echogenic hilus identified.     IMPRESSION:     1.  1.1 cm nodule left lobe of the thyroid     ACR TI-RADS Recommendations  TR4 (1-1.4cm) - follow up ultrasound in 1,2,3 and 5 years     Controlled Type 2 Diabetes Mellitus Without Complication, Without Long-Term Current Use of Insulin (Hcc)    This is a chronic condition.  Managed by endocrinology Dr. Allen.    Current regimen: Metformin 500 mg twice daily, Ozempic 0.5 mg weekly.  Last A1c:     Lab Results   Component Value Date/Time    HBA1C 5.9 (A) 01/23/2023 09:00 AM        Last Microalb/Cr ratio:   Lab Results   Component Value Date/Time    URCREAT 102.49 03/19/2022 1123    MICROALBUR 25.5 03/19/2022 1123    MALBCRT 249 (H) 03/19/2022 1123   Last diabetic foot exam: March 2023  Last retinal eye exam: 02/2023  ACEi/ARB: Not currently,  Statin: Currently, start pravastatin.  LDL: 122  Nightly foot checks: advised        Hyperlipidemia Associated With Type 2 Diabetes Mellitus (Hcc)    Patient was previously prescribed atorvastatin, taking it anymore.  She is interested in alternative medication.         She  has a past medical history of Allergy, Anxiety, ASTHMA (9/3/2010), COVID-19 (6/27/2022), Depression (2008), Diabetes mellitus type II (9/3/2010), Elevated liver enzymes (2009), GERD (gastroesophageal reflux disease), Hyperlipidemia, Thyroid nodule (3/8/2023), Urinary tract infection, site not specified, and Vitamin D deficiency (2009).    She has no past medical history of Addisons disease (HCC), Adrenal disorder (HCC), Anemia, Arrhythmia,  Arthritis, Blood transfusion, CATARACT, CHF (congestive heart failure) (HCC), Clotting disorder (HCC), COPD, Cushings syndrome (HCC), EMPHYSEMA, Glaucoma, Goiter, Headache(784.0), Heart attack (HCC), Heart murmur, Hypertension, IBD (inflammatory bowel disease), Kidney disease, Meningitis, Migraine, Muscle disorder, OSTEOPOROSIS, Parathyroid disorder (HCC), Pituitary disease (HCC), Seizure (HCC), Stroke (HCC), Substance abuse (HCC), Tuberculosis, or Ulcer.  She  has a past surgical history that includes primary c section; tubal coagulation laparoscopic bilateral; and orif, wrist (Right, 1/27/2023).    Family History   Problem Relation Age of Onset    Heart Disease Mother         65 yo stents, pacemaker x2    Hypertension Mother     Hyperlipidemia Mother     Arrythmia Mother     Diabetes Brother     Hyperlipidemia Brother     Diabetes Maternal Grandmother     Cancer Neg Hx      Social History     Socioeconomic History    Marital status:      Spouse name: Not on file    Number of children: Not on file    Years of education: Not on file    Highest education level: Not on file   Occupational History    Not on file   Tobacco Use    Smoking status: Never    Smokeless tobacco: Never   Vaping Use    Vaping Use: Never used   Substance and Sexual Activity    Alcohol use: Not Currently     Comment: 4 per year    Drug use: Never    Sexual activity: Yes     Partners: Male     Birth control/protection: Surgical   Other Topics Concern    Not on file   Social History Narrative    Not on file     Social Determinants of Health     Financial Resource Strain: Not on file   Food Insecurity: Not on file   Transportation Needs: Not on file   Physical Activity: Not on file   Stress: Not on file   Social Connections: Not on file   Intimate Partner Violence: Not on file   Housing Stability: Not on file     Current Outpatient Medications   Medication Sig Dispense Refill    pravastatin (PRAVACHOL) 20 MG Tab Take 1 Tablet by mouth every  "evening. 30 Tablet 11    vitamin D2, Ergocalciferol, (DRISDOL) 1.25 MG (58490 UT) Cap capsule Take 1 Capsule by mouth every 7 days. 12 Capsule 0    gabapentin (NEURONTIN) 100 MG Cap Take 1 Capsule by mouth 3 times a day. 90 Capsule 3    ondansetron (ZOFRAN ODT) 4 MG TABLET DISPERSIBLE DISSOLVE 1 TABLET IN MOUTH EVERY 6 HOURS AS NEEDED FOR NAUSEA AND VOMITING 10 Tablet 0    Semaglutide,0.25 or 0.5MG/DOS, (OZEMPIC, 0.25 OR 0.5 MG/DOSE,) 2 MG/1.5ML Solution Pen-injector Inject 0.5 mg under the skin every 7 days. Please make an appointment for future refills 1.5 mL 2    metFORMIN ER (GLUCOPHAGE XR) 500 MG TABLET SR 24 HR Take 2 tablets by mouth twice daily 360 Tablet 0    fluticasone (FLONASE) 50 MCG/ACT nasal spray Administer 1 Spray into affected nostril(S) every day. 16 g 1    albuterol 108 (90 Base) MCG/ACT Aero Soln inhalation aerosol Inhale 2 Puffs every 6 hours as needed for Shortness of Breath. 36 g 5    LORATADINE Take 10 mg by mouth 1 time daily as needed.      Multiple Vitamins-Minerals (MULTIVITAL PO) Take  by mouth.       No current facility-administered medications for this visit.     No Known Allergies    Review of Systems   Constitutional: Negative for fever, chills and malaise/fatigue.   HENT: Negative for congestion.    Eyes: Negative for pain.    Respiratory: Negative for cough and shortness of breath.  Cardiovascular: Negative for leg swelling.   Gastrointestinal: Negative for nausea, vomiting, abdominal pain and diarrhea.   Genitourinary: Negative for dysuria and hematuria.   Skin: Negative for rash.   Neurological: Negative for dizziness, focal weakness and headaches.   Endo/Heme/Allergies: Does not bleed easily.   Psychiatric/Behavioral: Negative for depression.  The patient is not nervous/anxious.      Objective:     /60 (BP Location: Left arm, Patient Position: Sitting, BP Cuff Size: Adult)   Pulse 86   Temp 36.7 °C (98.1 °F) (Temporal)   Ht 1.575 m (5' 2\")   Wt 60.3 kg (132 lb 15 oz)  "  SpO2 98%   BMI 24.31 kg/m²   Body mass index is 24.31 kg/m².  Wt Readings from Last 4 Encounters:   03/08/23 60.3 kg (132 lb 15 oz)   01/27/23 60.2 kg (132 lb 9.7 oz)   01/23/23 59.9 kg (132 lb)   01/17/23 65 kg (143 lb 4.8 oz)     Physical Exam  Constitutional:       Appearance: Normal appearance. She is normal weight.   HENT:      Head: Normocephalic and atraumatic.      Nose: Nose normal. No congestion.      Mouth/Throat:      Mouth: Mucous membranes are moist.      Pharynx: Oropharynx is clear. No oropharyngeal exudate or posterior oropharyngeal erythema.   Eyes:      General: No scleral icterus.  Cardiovascular:      Rate and Rhythm: Normal rate and regular rhythm.      Pulses: Normal pulses.      Heart sounds: Normal heart sounds. No murmur heard.  Pulmonary:      Effort: Pulmonary effort is normal. No respiratory distress.      Breath sounds: Normal breath sounds. No wheezing.   Abdominal:      General: Abdomen is flat. There is no distension.      Tenderness: There is no abdominal tenderness. There is no guarding.   Musculoskeletal:         General: Normal range of motion.      Comments: Right hand and wrist swelling   Skin:     General: Skin is warm.      Findings: No lesion.   Neurological:      General: No focal deficit present.      Mental Status: She is alert and oriented to person, place, and time. Mental status is at baseline.      Gait: Gait normal.   Psychiatric:         Mood and Affect: Mood normal.         Behavior: Behavior normal.     Assessment and Plan:     Problem List Items Addressed This Visit       Controlled type 2 diabetes mellitus without complication, without long-term current use of insulin (HCC) (Chronic)     Controlled, continue metformin and Ozempic.         Relevant Orders    Diabetic Monofilament LE Exam (Completed)    Hyperlipidemia associated with type 2 diabetes mellitus (HCC) (Chronic)     Start pravastatin 20 mg nightly.         Relevant Medications    pravastatin  (PRAVACHOL) 20 MG Tab    Thyroid nodule    Relevant Orders    US-THYROID    Vitamin D deficiency    Relevant Medications    vitamin D2, Ergocalciferol, (DRISDOL) 1.25 MG (07476 UT) Cap capsule     Other Visit Diagnoses       Visit for annual health examination    -  Primary    Need for vaccination        Relevant Orders    Pneumococcal Conjugate Vaccine 20-Valent (19 yrs+) (Completed)    Encounter for hepatitis C screening test for low risk patient        Relevant Orders    HCV Scrn ( 4792-7017 1xLife)          HCM:  as above   Labs per orders  Immunizations per orders  Patient counseled about skin care, diet, supplements, prenatal vitamins, safe sex and exercise.    Follow-up: Return in about 6 months (around 2023), or if symptoms worsen or fail to improve, for 6 mo follow up.    Please note that this dictation was created using voice recognition software. I have made every reasonable attempt to correct obvious errors, but I expect that there are errors of grammar and possibly content that I did not discover before finalizing the note.

## 2023-03-15 ENCOUNTER — HOSPITAL ENCOUNTER (OUTPATIENT)
Dept: RADIOLOGY | Facility: MEDICAL CENTER | Age: 48
End: 2023-03-15
Attending: INTERNAL MEDICINE
Payer: COMMERCIAL

## 2023-03-15 DIAGNOSIS — E04.1 THYROID NODULE: ICD-10-CM

## 2023-03-15 PROCEDURE — 76536 US EXAM OF HEAD AND NECK: CPT

## 2023-03-24 ENCOUNTER — OFFICE VISIT (OUTPATIENT)
Dept: ENDOCRINOLOGY | Facility: MEDICAL CENTER | Age: 48
End: 2023-03-24
Attending: INTERNAL MEDICINE
Payer: COMMERCIAL

## 2023-03-24 VITALS
SYSTOLIC BLOOD PRESSURE: 118 MMHG | BODY MASS INDEX: 25.4 KG/M2 | DIASTOLIC BLOOD PRESSURE: 70 MMHG | WEIGHT: 138 LBS | HEART RATE: 88 BPM | HEIGHT: 62 IN | OXYGEN SATURATION: 99 %

## 2023-03-24 DIAGNOSIS — E78.5 DYSLIPIDEMIA: ICD-10-CM

## 2023-03-24 DIAGNOSIS — Z79.84 LONG TERM (CURRENT) USE OF ORAL HYPOGLYCEMIC DRUGS: ICD-10-CM

## 2023-03-24 DIAGNOSIS — E11.9 CONTROLLED TYPE 2 DIABETES MELLITUS WITHOUT COMPLICATION, WITHOUT LONG-TERM CURRENT USE OF INSULIN (HCC): ICD-10-CM

## 2023-03-24 DIAGNOSIS — E55.9 VITAMIN D DEFICIENCY: ICD-10-CM

## 2023-03-24 DIAGNOSIS — E04.2 MULTIPLE THYROID NODULES: ICD-10-CM

## 2023-03-24 PROBLEM — E04.1 THYROID NODULE: Status: RESOLVED | Noted: 2023-03-08 | Resolved: 2023-03-24

## 2023-03-24 PROCEDURE — 99212 OFFICE O/P EST SF 10 MIN: CPT | Performed by: INTERNAL MEDICINE

## 2023-03-24 PROCEDURE — 99214 OFFICE O/P EST MOD 30 MIN: CPT | Performed by: INTERNAL MEDICINE

## 2023-03-24 RX ORDER — SEMAGLUTIDE 1.34 MG/ML
0.5 INJECTION, SOLUTION SUBCUTANEOUS
Qty: 4.5 ML | Refills: 3 | Status: SHIPPED | OUTPATIENT
Start: 2023-03-24 | End: 2023-09-21

## 2023-03-24 RX ORDER — PRAVASTATIN SODIUM 40 MG
40 TABLET ORAL NIGHTLY
Qty: 90 TABLET | Refills: 2 | Status: SHIPPED | OUTPATIENT
Start: 2023-03-24 | End: 2023-09-21 | Stop reason: SDUPTHER

## 2023-03-24 RX ORDER — ERGOCALCIFEROL 1.25 MG/1
50000 CAPSULE ORAL
Qty: 12 CAPSULE | Refills: 2 | Status: SHIPPED | OUTPATIENT
Start: 2023-03-24

## 2023-03-24 RX ORDER — METFORMIN HYDROCHLORIDE 500 MG/1
1000 TABLET, EXTENDED RELEASE ORAL 2 TIMES DAILY
Qty: 360 TABLET | Refills: 3 | Status: SHIPPED | OUTPATIENT
Start: 2023-03-24 | End: 2023-09-21 | Stop reason: SDUPTHER

## 2023-03-24 ASSESSMENT — FIBROSIS 4 INDEX: FIB4 SCORE: 0.51

## 2023-03-24 NOTE — PROGRESS NOTES
CHIEF COMPLAINT: Patient is here for follow up of Type 2 Diabetes Mellitus    HPI:     Sybil Manuel is a 47 y.o. female with Type 2 Diabetes Mellitus here for follow up.    Labs from January 23, 2023 show A1c is 5.9%  Labs from 3/25/2022 show A1c was 4.9%    She is currently on   Metformin extended release 1000 mg twice a day and Ozempic 0.5 mg weekly    She denies any side effects with her medications  Since I last saw her she fell and broke her right wrist  After slipping on ice        She previously had albuminuria but she was acutely ill with flu like symptoms at that time in March 2022  Fortunately on retesting her albuminuria has resolved  U ACR is less than 30 on January 2023        She has hyperlipidemia and is on Lipitor 20 mg daily  She denies muscle aches and muscle weakness or side effects  She denies a history of coronary artery disease and cerebrovascular disease  LDL cholesterol is still suboptimal at 112 on January 2023  We discussed optimizing her statin dose.      She had an eye exam in February 20, 2023 with Dr. Osorio showing no evidence of diabetic retinopathy      Incidentally she also had an ultrasound with her primary care showing multiple thyroid nodules with a subcentimeter nodule on the left mid lobe and a poorly circumscribed isoechoic solid nodule in the left lower lobe        BG Diary:  Patient forgot her meter    Weight has increased by 6 pounds since last visit      Diabetes Complications   Retinopathy: No known retinopathy.  Last eye exam: February 20, 2023  Neuropathy: Denies paresthesias or numbness in hands or feet. Denies any foot wounds.  Exercise: Minimal.  Diet: Fair.  Patient's medications, allergies, and social histories were reviewed and updated as appropriate.    ROS:     CONS:     No fever, no chills   EYES:     No diplopia, no blurry vision   CV:           No chest pain, no palpitations   PULM:     No SOB, no cough, no hemoptysis.   GI:            No nausea, no  "vomiting, no diarrhea, no constipation   ENDO:     No polyuria, no polydipsia, no heat intolerance, no cold intolerance       Past Medical History:  Problem List:  2023-03: Dyslipidemia  2023-03: Multiple thyroid nodules  2023-03: Thyroid nodule  2023-01: Closed fracture of right distal radius  2022-09: Controlled type 2 diabetes mellitus without complication,   without long-term current use of insulin (Formerly [facility])  2022-06: COVID-19  2022-03: Microalbuminuria  2021-12: Bilateral calf pain  2021-11: Morning headache  2021-09: Long term (current) use of oral hypoglycemic drugs  2021-04: Palpitations  2018-07: Hyperlipidemia associated with type 2 diabetes mellitus (Former[facility])  2018-07: Elevated liver enzymes  2016-07: Seasonal allergies  2011-07: Acne  2010-09: Diabetes mellitus, type II (Former[facility])  2010-09: Vitamin D deficiency  2010-09: ASTHMA  2010-07: Hyperglycemia      Past Surgical History:  Past Surgical History:   Procedure Laterality Date    ORIF, WRIST Right 1/27/2023    Procedure: RIGHT DISTAL RADIUS OPEN REDUCTION INTERNAL FIXATION, REPAIRS AS INDICATED;  Surgeon: Perri Orozco M.D.;  Location: [facility];  Service: Orthopedics    PRIMARY C SECTION      TUBAL COAGULATION LAPAROSCOPIC BILATERAL          Allergies:  Patient has no known allergies.     Social History:  Social History     Tobacco Use    Smoking status: Never    Smokeless tobacco: Never   Vaping Use    Vaping Use: Never used   Substance Use Topics    Alcohol use: Not Currently     Comment: 4 per year    Drug use: Never        Family History:   family history includes Arrythmia in her mother; Diabetes in her brother and maternal grandmother; Heart Disease in her mother; Hyperlipidemia in her brother and mother; Hypertension in her mother.      PHYSICAL EXAM:   OBJECTIVE:  Vital signs: /70   Pulse 88   Ht 1.575 m (5' 2\")   Wt 62.6 kg (138 lb)   SpO2 99%   BMI 25.24 kg/m²   GENERAL: Well-developed, well-nourished in no " apparent distress.   EYE:  No ocular asymmetry, PERRLA  HENT: Pink, moist mucous membranes.    NECK: No thyromegaly.   CARDIOVASCULAR:  No murmurs  LUNGS: Clear breath sounds  ABDOMEN: Soft, nontender   EXTREMITIES: No clubbing, cyanosis, or edema.   NEUROLOGICAL: No gross focal motor abnormalities   LYMPH: No cervical adenopathy palpated.   SKIN: No rashes, lesions.     Labs:  Lab Results   Component Value Date/Time    HBA1C 5.9 (A) 01/23/2023 09:00 AM        Lab Results   Component Value Date/Time    WBC 6.2 10/29/2022 12:27 PM    WBC 6.1 07/20/2010 09:00 AM    RBC 4.82 10/29/2022 12:27 PM    RBC 4.99 07/20/2010 09:00 AM    HEMOGLOBIN 13.0 10/29/2022 12:27 PM    MCV 85.7 10/29/2022 12:27 PM    MCV 90 07/20/2010 09:00 AM    MCH 27.0 10/29/2022 12:27 PM    MCH 28.5 07/20/2010 09:00 AM    MCHC 31.5 (L) 10/29/2022 12:27 PM    RDW 42.6 10/29/2022 12:27 PM    RDW 15.1 (H) 07/20/2010 09:00 AM    MPV 10.2 10/29/2022 12:27 PM       Lab Results   Component Value Date/Time    SODIUM 138 01/21/2023 10:51 AM    POTASSIUM 4.8 01/21/2023 10:51 AM    CHLORIDE 103 01/21/2023 10:51 AM    CO2 25 01/21/2023 10:51 AM    ANION 10.0 01/21/2023 10:51 AM    GLUCOSE 106 (H) 01/21/2023 10:51 AM    BUN 9 01/21/2023 10:51 AM    CREATININE 0.51 01/21/2023 10:51 AM    CREATININE 0.67 07/20/2010 09:00 AM    CALCIUM 9.3 01/21/2023 10:51 AM    ASTSGOT 15 01/21/2023 10:51 AM    ALTSGPT 12 01/21/2023 10:51 AM    TBILIRUBIN 0.3 01/21/2023 10:51 AM    ALBUMIN 4.3 01/21/2023 10:51 AM    TOTPROTEIN 7.5 01/21/2023 10:51 AM    GLOBULIN 3.2 01/21/2023 10:51 AM    AGRATIO 1.3 01/21/2023 10:51 AM       Lab Results   Component Value Date/Time    CHOLSTRLTOT 188 03/19/2022 1122    TRIGLYCERIDE 144 03/19/2022 1122    HDL 37 (A) 03/19/2022 1122     (H) 03/19/2022 1122       Lab Results   Component Value Date/Time    MALBCRT see below 01/21/2023 11:06 AM    MICROALBUR <1.2 01/21/2023 11:06 AM        Lab Results   Component Value Date/Time    TSHULTRASEN  1.150 03/19/2022 1122     Lab Results   Component Value Date/Time    FREEDIR 1.02 07/20/2010 0900     No results found for: FREET3  No results found for: THYSTIMIG    IMAGING:  3/15/2023 4:38 PM     HISTORY/REASON FOR EXAM:  Palpable lump, follow up prior ultrasound.     TECHNIQUE/EXAM DESCRIPTION:  Ultrasound of the soft tissues of the head and neck.     COMPARISON:  1/28/2022 ultrasound of the soft tissues of the head and neck.     FINDINGS:  The thyroid gland is heterogeneous.  Vascularity is normal.     The right lobe of the thyroid gland measures 1.65 cm x 5.13 cm x 1.45 cm.  The left lobe of the thyroid gland measures 1.30 cm x 4.45 cm x 1.67 cm.  The isthmus measures 0.22 cm.     Nodules >= 1cm:     Nodule #1  Location:  Left  mid  Size:  1.1 x 0.8 x 0.7 cm  Composition:  Mixed-1  Echogenicity:  Hypoechoic-2  Shape:  Wider than tall-0  Margins:  Smooth-0  Echogenic Foci:  None-0     ACR TIRADS points/category:  3 - TR3 - Mildly Suspicious     Several additional subcentimeter thyroid nodules do not meet criteria for biopsy or follow-up.     IMPRESSION:     1.  Left thyroid nodule is stable since prior study. No new thyroid nodules or masses.  2.  Several additional subcentimeter thyroid nodules do not meet criteria for biopsy or follow-up.     ACR TI-RADS Recommendations  TR3 - No FNA or follow up recommended.     Recommendations based on the American College of Radiology Thyroid imaging, reporting and Data System (TI-RADS) 2017.     INTERPRETING LOCATION: [address] [place] [address], [id number]      ASSESSMENT/PLAN:     1. Controlled type 2 diabetes mellitus without complication, without long-term current use of insulin (HCC)  Controlled  Continue metformin extended release 500 mg twice a day  Continue Ozempic 0.5 mg weekly  She is up-to-date with her eye exam  We will repeat fasting lipids, serum creatinine urine albumin with her next labs in 3 to 6 months    2. Dyslipidemia  Uncontrolled  LDL cholesterol is not  optimal at 112  Increase pravastatin 40 mg daily  Repeat fasting lipids with next labs    3. Multiple thyroid nodules  Stable reviewed thyroid ultrasound images with patient  Reviewed the low risk of medullary thyroid cancer with GLP-1 analog therapy  She denies a family history of medullary thyroid cancer  I recommend continued observation of her thyroid nodules which appear to be low risk  I plan to repeat her thyroid ultrasound in the office next year    4. Vitamin D deficiency  Uncontrolled  Agree with vitamin D 50,000 units weekly  Continue monitoring    5. Long term (current) use of oral hypoglycemic drugs  Patient is on oral agents and a GLP-1 for type 2 diabetes management      Return in about 6 months (around 9/24/2023).        Thank you kindly for allowing me to participate in the diabetes care plan for this patient.    José Luis Ragsdale MD, FACE, ECNU      CC:   Pcp Pt States None

## 2023-03-24 NOTE — PROGRESS NOTES
"RN-CDE Note    Subjective:   Endocrinology Clinic Progress Note  PCP: Perla Dixon M.D.    HPI:  Sybil Manuel is a 47 y.o. old patient who is seen today by the Diabetes Nurse Specialist for review of Type 2 Diabetes.  Recent changes in health: Fractured right wrist and having a lot of pain.  DM:   Last A1c:   Lab Results   Component Value Date/Time    HBA1C 5.9 (A) 01/23/2023 09:00 AM      Previous A1c was 5.9 on 1/23/23  A1C GOAL: < 7    Diabetes Medications:   Metformin  mg 2 BID  Ozempic .5 mg weekly      Exercise: no regular exercise since broke wrist   Diet: \"healthy\" diet  in general  Patient's body mass index is unknown because there is no height or weight on file. Exercise and nutrition counseling were performed at this visit.    Glucose monitoring frequency: Not been able to test    Hypoglycemic episodes: no  Last Retinal Exam: on file and up-to-date  Daily Foot Exam: Yes   Foot Exam:  Monofilament: done  Monofilament testing with a 10 gram force: sensation intact: intact bilaterally  Visual Inspection: Feet without maceration, ulcers, fissures.  Pedal pulses: intact bilaterally   Lab Results   Component Value Date/Time    MALBCRT see below 01/21/2023 11:06 AM    MICROALBUR <1.2 01/21/2023 11:06 AM        ACR Albumin/Creatinine Ratio goal <30     HTN:   Blood pressure goal <130/<80 .   Currently Rx ACE/ARB: No     Dyslipidemia:    Lab Results   Component Value Date/Time    CHOLSTRLTOT 175 01/21/2023 10:51 AM     (H) 01/21/2023 10:51 AM    HDL 37 (A) 01/21/2023 10:51 AM    TRIGLYCERIDE 129 01/21/2023 10:51 AM         Currently Rx Statin: Yes     She  reports that she has never smoked. She has never used smokeless tobacco.      Plan:     Discussed and educated on:   - All medications, side effects and compliance (discussed carefully)  - Annual eye examinations at Ophthalmology  - Home glucose monitoring emphasized  - Weight control and daily exercise    Recommended medication " changes: No changes at this time.

## 2023-04-06 ENCOUNTER — HOSPITAL ENCOUNTER (OUTPATIENT)
Dept: RADIOLOGY | Facility: MEDICAL CENTER | Age: 48
End: 2023-04-06
Attending: OBSTETRICS & GYNECOLOGY
Payer: COMMERCIAL

## 2023-04-06 DIAGNOSIS — Z12.31 VISIT FOR SCREENING MAMMOGRAM: ICD-10-CM

## 2023-04-06 PROCEDURE — 77063 BREAST TOMOSYNTHESIS BI: CPT

## 2023-07-31 ENCOUNTER — TELEPHONE (OUTPATIENT)
Dept: ENDOCRINOLOGY | Facility: MEDICAL CENTER | Age: 48
End: 2023-07-31
Payer: COMMERCIAL

## 2023-07-31 NOTE — TELEPHONE ENCOUNTER
VOICEMAIL  07/28  1:08 am  1. Caller Name: Sybil                      Call Back Number: [phone]    2. Message: - due for labs before upcoming appt. need lab orders. [facility] lab. Rec'd letter from insurance now require a PA for Ozempic. She is currently on last box. Please start on PA. would like call back at [phone]. 07/28 11:08 am    3. Patient approves office to leave a detailed voicemail/MyChart message: N\A

## 2023-08-01 PROBLEM — T85.848A PAIN FROM IMPLANTED HARDWARE: Status: ACTIVE | Noted: 2023-08-01

## 2023-09-14 ENCOUNTER — APPOINTMENT (OUTPATIENT)
Dept: MEDICAL GROUP | Facility: MEDICAL CENTER | Age: 48
End: 2023-09-14
Payer: COMMERCIAL

## 2023-09-20 ENCOUNTER — HOSPITAL ENCOUNTER (OUTPATIENT)
Dept: LAB | Facility: MEDICAL CENTER | Age: 48
End: 2023-09-20
Attending: INTERNAL MEDICINE
Payer: COMMERCIAL

## 2023-09-20 DIAGNOSIS — Z79.84 LONG TERM (CURRENT) USE OF ORAL HYPOGLYCEMIC DRUGS: ICD-10-CM

## 2023-09-20 DIAGNOSIS — E55.9 VITAMIN D DEFICIENCY: ICD-10-CM

## 2023-09-20 DIAGNOSIS — E78.5 DYSLIPIDEMIA: ICD-10-CM

## 2023-09-20 DIAGNOSIS — E11.9 CONTROLLED TYPE 2 DIABETES MELLITUS WITHOUT COMPLICATION, WITHOUT LONG-TERM CURRENT USE OF INSULIN (HCC): ICD-10-CM

## 2023-09-20 LAB
25(OH)D3 SERPL-MCNC: 25 NG/ML (ref 30–100)
ALBUMIN SERPL BCP-MCNC: 4.3 G/DL (ref 3.2–4.9)
ALBUMIN/GLOB SERPL: 1.3 G/DL
ALP SERPL-CCNC: 77 U/L (ref 30–99)
ALT SERPL-CCNC: 15 U/L (ref 2–50)
ANION GAP SERPL CALC-SCNC: 12 MMOL/L (ref 7–16)
AST SERPL-CCNC: 18 U/L (ref 12–45)
BILIRUB SERPL-MCNC: 0.2 MG/DL (ref 0.1–1.5)
BUN SERPL-MCNC: 8 MG/DL (ref 8–22)
CALCIUM ALBUM COR SERPL-MCNC: 8.7 MG/DL (ref 8.5–10.5)
CALCIUM SERPL-MCNC: 8.9 MG/DL (ref 8.5–10.5)
CHLORIDE SERPL-SCNC: 103 MMOL/L (ref 96–112)
CHOLEST SERPL-MCNC: 202 MG/DL (ref 100–199)
CO2 SERPL-SCNC: 24 MMOL/L (ref 20–33)
CREAT SERPL-MCNC: 0.62 MG/DL (ref 0.5–1.4)
GFR SERPLBLD CREATININE-BSD FMLA CKD-EPI: 109 ML/MIN/1.73 M 2
GLOBULIN SER CALC-MCNC: 3.4 G/DL (ref 1.9–3.5)
GLUCOSE SERPL-MCNC: 102 MG/DL (ref 65–99)
HDLC SERPL-MCNC: 32 MG/DL
LDLC SERPL CALC-MCNC: 130 MG/DL
POTASSIUM SERPL-SCNC: 4.8 MMOL/L (ref 3.6–5.5)
PROT SERPL-MCNC: 7.7 G/DL (ref 6–8.2)
PTH-INTACT SERPL-MCNC: 47.2 PG/ML (ref 14–72)
SODIUM SERPL-SCNC: 139 MMOL/L (ref 135–145)
TRIGL SERPL-MCNC: 202 MG/DL (ref 0–149)

## 2023-09-20 PROCEDURE — 80061 LIPID PANEL: CPT

## 2023-09-20 PROCEDURE — 82306 VITAMIN D 25 HYDROXY: CPT

## 2023-09-20 PROCEDURE — 83970 ASSAY OF PARATHORMONE: CPT

## 2023-09-20 PROCEDURE — 36415 COLL VENOUS BLD VENIPUNCTURE: CPT

## 2023-09-20 PROCEDURE — 80053 COMPREHEN METABOLIC PANEL: CPT

## 2023-09-21 ENCOUNTER — OFFICE VISIT (OUTPATIENT)
Dept: ENDOCRINOLOGY | Facility: MEDICAL CENTER | Age: 48
End: 2023-09-21
Attending: INTERNAL MEDICINE
Payer: COMMERCIAL

## 2023-09-21 VITALS
SYSTOLIC BLOOD PRESSURE: 148 MMHG | OXYGEN SATURATION: 100 % | DIASTOLIC BLOOD PRESSURE: 85 MMHG | HEIGHT: 62 IN | BODY MASS INDEX: 25.4 KG/M2 | HEART RATE: 72 BPM | WEIGHT: 138 LBS

## 2023-09-21 DIAGNOSIS — E78.5 DYSLIPIDEMIA: ICD-10-CM

## 2023-09-21 DIAGNOSIS — E11.9 CONTROLLED TYPE 2 DIABETES MELLITUS WITHOUT COMPLICATION, WITHOUT LONG-TERM CURRENT USE OF INSULIN (HCC): ICD-10-CM

## 2023-09-21 DIAGNOSIS — E55.9 VITAMIN D DEFICIENCY: ICD-10-CM

## 2023-09-21 DIAGNOSIS — E04.2 MULTIPLE THYROID NODULES: ICD-10-CM

## 2023-09-21 LAB
HBA1C MFR BLD: 5.8 % (ref ?–5.8)
POCT INT CON NEG: NEGATIVE
POCT INT CON POS: POSITIVE

## 2023-09-21 PROCEDURE — 83036 HEMOGLOBIN GLYCOSYLATED A1C: CPT | Performed by: INTERNAL MEDICINE

## 2023-09-21 PROCEDURE — 3079F DIAST BP 80-89 MM HG: CPT | Performed by: INTERNAL MEDICINE

## 2023-09-21 PROCEDURE — 99214 OFFICE O/P EST MOD 30 MIN: CPT | Performed by: INTERNAL MEDICINE

## 2023-09-21 PROCEDURE — 99212 OFFICE O/P EST SF 10 MIN: CPT | Performed by: INTERNAL MEDICINE

## 2023-09-21 PROCEDURE — 3077F SYST BP >= 140 MM HG: CPT | Performed by: INTERNAL MEDICINE

## 2023-09-21 RX ORDER — SEMAGLUTIDE 0.68 MG/ML
0.5 INJECTION, SOLUTION SUBCUTANEOUS
Qty: 9 ML | Refills: 2 | Status: SHIPPED | OUTPATIENT
Start: 2023-09-21

## 2023-09-21 RX ORDER — METFORMIN HYDROCHLORIDE 500 MG/1
1000 TABLET, EXTENDED RELEASE ORAL 2 TIMES DAILY
Qty: 360 TABLET | Refills: 3 | Status: SHIPPED | OUTPATIENT
Start: 2023-09-21

## 2023-09-21 RX ORDER — PRAVASTATIN SODIUM 40 MG
40 TABLET ORAL NIGHTLY
Qty: 90 TABLET | Refills: 2 | Status: SHIPPED | OUTPATIENT
Start: 2023-09-21

## 2023-09-21 ASSESSMENT — FIBROSIS 4 INDEX: FIB4 SCORE: 0.56

## 2023-09-21 NOTE — PROGRESS NOTES
CHIEF COMPLAINT: Patient is here for follow up of Type 2 Diabetes Mellitus    HPI:     Sybil Manuel is a 48 y.o. female with Type 2 Diabetes Mellitus here for follow up.    Labs from 8/22/2023 show a1c is 5.8%  Labs from January 23, 2023 show A1c is 5.9%  Labs from 3/25/2022 show A1c was 4.9%    She is currently on   Metformin extended release 1000 mg twice a day   Ozempic 0.5 mg weekly    She denies any side effects with her medications  She is having more surgery for her right wrist        She previously had albuminuria but she was acutely ill with flu like symptoms at that time in March 2022  Fortunately on retesting her albuminuria has resolved  U ACR is less than 30 on January 2023        She has hyperlipidemia and is on Lipitor 20 mg daily  She admits to noncompliance   She denies muscle aches and muscle weakness or side effects  She denies a history of coronary artery disease and cerebrovascular disease  LDL cholesterol is 130 on 9/2023    She had an eye exam in February 20, 2023 with Dr. Osorio showing no evidence of diabetic retinopathy      She has thyroid nodules and last US was on 3/15/2023   This now showed a mixed or complex hypoechoic 1.1 cm nodule on rhe left mid lobe  She denies a family hx of thyroid cancer and radiation exposure  She is euthyroid  Her TSH was normal at 1.2 on Jan 2023   We reviewed  her US again today and she is requesting FNA of the LML complex nodule            BG Diary:  Patient forgot her meter    Weight has increased by 6 pounds since last visit      Diabetes Complications   Retinopathy: No known retinopathy.  Last eye exam: February 20, 2023  Neuropathy: Denies paresthesias or numbness in hands or feet. Denies any foot wounds.  Exercise: Minimal.  Diet: Fair.  Patient's medications, allergies, and social histories were reviewed and updated as appropriate.    ROS:     CONS:     No fever, no chills   EYES:     No diplopia, no blurry vision   CV:           No chest pain, no  palpitations   PULM:     No SOB, no cough, no hemoptysis.   GI:            No nausea, no vomiting, no diarrhea, no constipation   ENDO:     No polyuria, no polydipsia, no heat intolerance, no cold intolerance       Past Medical History:  Problem List:  2023-08: Pain from implanted hardware  2023-03: Dyslipidemia  2023-03: Multiple thyroid nodules  2023-03: Thyroid nodule  2023-01: Closed fracture of right distal radius  2022-09: Controlled type 2 diabetes mellitus without complication,   without long-term current use of insulin ([facility])  2022-06: COVID-19  2022-03: Microalbuminuria  2021-12: Bilateral calf pain  2021-11: Morning headache  2021-09: Long term (current) use of oral hypoglycemic drugs  2021-04: Palpitations  2018-07: Hyperlipidemia associated with type 2 diabetes mellitus ([facility])  2018-07: Elevated liver enzymes  2016-07: Seasonal allergies  2011-07: Acne  2010-09: Diabetes mellitus, type II ([facility])  2010-09: Vitamin D deficiency  2010-09: ASTHMA  2010-07: Hyperglycemia      Past Surgical History:  Past Surgical History:   Procedure Laterality Date    ORIF, WRIST Right 1/27/2023    Procedure: RIGHT DISTAL RADIUS OPEN REDUCTION INTERNAL FIXATION, REPAIRS AS INDICATED;  Surgeon: Perri Orozco M.D.;  Location: [facility];  Service: Orthopedics    PRIMARY C SECTION      TUBAL COAGULATION LAPAROSCOPIC BILATERAL          Allergies:  Patient has no known allergies.     Social History:  Social History     Tobacco Use    Smoking status: Never    Smokeless tobacco: Never   Vaping Use    Vaping Use: Never used   Substance Use Topics    Alcohol use: Not Currently     Comment: 4 per year    Drug use: Never        Family History:   family history includes Arrythmia in her mother; Diabetes in her brother and maternal grandmother; Heart Disease in her mother; Hyperlipidemia in her brother and mother; Hypertension in her mother.      PHYSICAL EXAM:   OBJECTIVE:  Vital signs: BP (!) 148/85 (BP  "Location: Right arm, Patient Position: Sitting, BP Cuff Size: Adult)   Pulse 72   Ht 1.575 m (5' 2\")   Wt 62.6 kg (138 lb)   LMP 09/14/2023 (Approximate)   SpO2 100%   BMI 25.24 kg/m²   GENERAL: Well-developed, well-nourished in no apparent distress.   EYE:  No ocular asymmetry, PERRLA  HENT: Pink, moist mucous membranes.    NECK: No thyromegaly.   CARDIOVASCULAR:  No murmurs  LUNGS: Clear breath sounds  ABDOMEN: Soft, nontender   EXTREMITIES: No clubbing, cyanosis, or edema.   NEUROLOGICAL: No gross focal motor abnormalities   LYMPH: No cervical adenopathy palpated.   SKIN: No rashes, lesions.     Labs:  Lab Results   Component Value Date/Time    HBA1C 5.8 08/22/2023 04:51 PM        Lab Results   Component Value Date/Time    WBC 6.2 10/29/2022 12:27 PM    WBC 6.1 07/20/2010 09:00 AM    RBC 4.82 10/29/2022 12:27 PM    RBC 4.99 07/20/2010 09:00 AM    HEMOGLOBIN 13.0 10/29/2022 12:27 PM    MCV 85.7 10/29/2022 12:27 PM    MCV 90 07/20/2010 09:00 AM    MCH 27.0 10/29/2022 12:27 PM    MCH 28.5 07/20/2010 09:00 AM    MCHC 31.5 (L) 10/29/2022 12:27 PM    RDW 42.6 10/29/2022 12:27 PM    RDW 15.1 (H) 07/20/2010 09:00 AM    MPV 10.2 10/29/2022 12:27 PM       Lab Results   Component Value Date/Time    SODIUM 139 09/20/2023 07:21 AM    POTASSIUM 4.8 09/20/2023 07:21 AM    CHLORIDE 103 09/20/2023 07:21 AM    CO2 24 09/20/2023 07:21 AM    ANION 12.0 09/20/2023 07:21 AM    GLUCOSE 102 (H) 09/20/2023 07:21 AM    BUN 8 09/20/2023 07:21 AM    CREATININE 0.62 09/20/2023 07:21 AM    CREATININE 0.67 07/20/2010 09:00 AM    CALCIUM 8.9 09/20/2023 07:21 AM    ASTSGOT 18 09/20/2023 07:21 AM    ALTSGPT 15 09/20/2023 07:21 AM    TBILIRUBIN 0.2 09/20/2023 07:21 AM    ALBUMIN 4.3 09/20/2023 07:21 AM    TOTPROTEIN 7.7 09/20/2023 07:21 AM    GLOBULIN 3.4 09/20/2023 07:21 AM    AGRATIO 1.3 09/20/2023 07:21 AM       Lab Results   Component Value Date/Time    CHOLSTRLTOT 188 03/19/2022 1122    TRIGLYCERIDE 144 03/19/2022 1122    HDL 37 (A) " 03/19/2022 1122     (H) 03/19/2022 1122       Lab Results   Component Value Date/Time    MALBCRT see below 01/21/2023 11:06 AM    MICROALBUR <1.2 01/21/2023 11:06 AM        Lab Results   Component Value Date/Time    TSHULTRASEN 1.150 03/19/2022 1122     Lab Results   Component Value Date/Time    FREEDIR 1.02 07/20/2010 0900     No results found for: FREET3  No results found for: THYSTIMIG    IMAGING:  3/15/2023 4:38 PM     HISTORY/REASON FOR EXAM:  Palpable lump, follow up prior ultrasound.     TECHNIQUE/EXAM DESCRIPTION:  Ultrasound of the soft tissues of the head and neck.     COMPARISON:  1/28/2022 ultrasound of the soft tissues of the head and neck.     FINDINGS:  The thyroid gland is heterogeneous.  Vascularity is normal.     The right lobe of the thyroid gland measures 1.65 cm x 5.13 cm x 1.45 cm.  The left lobe of the thyroid gland measures 1.30 cm x 4.45 cm x 1.67 cm.  The isthmus measures 0.22 cm.     Nodules >= 1cm:     Nodule #1  Location:  Left  mid  Size:  1.1 x 0.8 x 0.7 cm  Composition:  Mixed-1  Echogenicity:  Hypoechoic-2  Shape:  Wider than tall-0  Margins:  Smooth-0  Echogenic Foci:  None-0     ACR TIRADS points/category:  3 - TR3 - Mildly Suspicious     Several additional subcentimeter thyroid nodules do not meet criteria for biopsy or follow-up.     IMPRESSION:     1.  Left thyroid nodule is stable since prior study. No new thyroid nodules or masses.  2.  Several additional subcentimeter thyroid nodules do not meet criteria for biopsy or follow-up.     ACR TI-RADS Recommendations  TR3 - No FNA or follow up recommended.     Recommendations based on the American College of Radiology Thyroid imaging, reporting and Data System (TI-RADS) 2017.     INTERPRETING LOCATION: [address] [place] [address], [id number]      ASSESSMENT/PLAN:     1. Controlled type 2 diabetes mellitus without complication, without long-term current use of insulin (HCC)  Controlled  A1c is stable and controlled without lows  Continue  metformin extended release 500 mg twice a day  Continue Ozempic 0.5 mg weekly  I resent her scripts for her DM meds  We will repeat fasting lipids, serum creatinine urine albumin with her next labs in 3 to 6 months    2. Dyslipidemia  Uncontrolled  Recommend better compliance with statin therapy   Repeat fasting lipids with next labs    3. Multiple thyroid nodules  Stable reviewed thyroid ultrasound images with patient  I am going to conduct an  in office biopsy of the complex nodule on left mid lobe   If benign then I recommend observation     4. Vitamin D deficiency  Improved but still suboptimal   Continue  vitamin D 50,000 units weekly  Continue monitoring    5. Long term (current) use of oral hypoglycemic drugs  Patient is on oral agents and a GLP-1 for type 2 diabetes management      Return in about 3 months (around 12/21/2023).        Thank you kindly for allowing me to participate in the diabetes care plan for this patient.    José Luis Ragsdale MD, SENAIT, ECNU      CC:   Pcp Pt States None

## 2023-09-22 ENCOUNTER — TELEPHONE (OUTPATIENT)
Dept: PHARMACY | Facility: MEDICAL CENTER | Age: 48
End: 2023-09-22
Payer: COMMERCIAL

## 2023-09-22 ENCOUNTER — TELEPHONE (OUTPATIENT)
Dept: ENDOCRINOLOGY | Facility: MEDICAL CENTER | Age: 48
End: 2023-09-22
Payer: COMMERCIAL

## 2023-09-22 NOTE — TELEPHONE ENCOUNTER
Spoke with patient offer our Care Model to fill Ozempic 2MG/3ML Solution #9ML 84 day supply at our [facility] she declined and asked to please send script to [facility] [place], [place] - [address]    Alondra Wilder, Pharmacy Liaison/ RX Coordinator

## 2023-09-22 NOTE — TELEPHONE ENCOUNTER
Semaglutide,0.25 or 0.5MG/DOS, (OZEMPIC, 0.25 OR 0.5 MG/DOSE,) 2 MG/3ML Solution Pen-injector       Received Refill request via [facility]  for $N/A. (Quantity:9ml, Day Supply:84)     RTS.    Insurance: RX listed[employer]  Member ID:  [id number]  BIN: [id number]  PCN: [id number]  Group: [id number]     Ran Test claim via [employer] [facility] medication  RTS    Next fill date 11/02/2023    Pharmacy    [facility] [address] [place] [id number]   Phone:  [phone]  Fax:  [phone]

## 2023-09-26 ENCOUNTER — RESEARCH ENCOUNTER (OUTPATIENT)
Dept: RESEARCH | Facility: WORKSITE | Age: 48
End: 2023-09-26
Payer: COMMERCIAL

## 2023-09-26 DIAGNOSIS — Z00.6 RESEARCH STUDY PATIENT: ICD-10-CM

## 2023-09-26 NOTE — RESEARCH NOTE
Confirmed with the participant which designated provider they would like study results shared with. Patient will have an opportunity to share the results with any providers of their choosing in the future by accessing their results from [employer].

## 2023-09-28 ENCOUNTER — APPOINTMENT (OUTPATIENT)
Dept: MEDICAL GROUP | Facility: MEDICAL CENTER | Age: 48
End: 2023-09-28
Payer: COMMERCIAL

## 2023-09-29 ENCOUNTER — HOSPITAL ENCOUNTER (OUTPATIENT)
Dept: LAB | Facility: MEDICAL CENTER | Age: 48
End: 2023-09-29
Attending: INTERNAL MEDICINE
Payer: COMMERCIAL

## 2023-09-29 DIAGNOSIS — Z00.6 RESEARCH STUDY PATIENT: ICD-10-CM

## 2023-10-31 LAB
APOB+LDLR+PCSK9 GENE MUT ANL BLD/T: NOT DETECTED
BRCA1+BRCA2 DEL+DUP + FULL MUT ANL BLD/T: NOT DETECTED
MLH1+MSH2+MSH6+PMS2 GN DEL+DUP+FUL M: NOT DETECTED

## 2023-11-27 ENCOUNTER — PROCEDURE VISIT (OUTPATIENT)
Dept: ENDOCRINOLOGY | Facility: MEDICAL CENTER | Age: 48
End: 2023-11-27
Attending: INTERNAL MEDICINE
Payer: COMMERCIAL

## 2023-11-27 ENCOUNTER — HOSPITAL ENCOUNTER (OUTPATIENT)
Facility: MEDICAL CENTER | Age: 48
End: 2023-11-27
Attending: INTERNAL MEDICINE
Payer: COMMERCIAL

## 2023-11-27 DIAGNOSIS — E11.9 CONTROLLED TYPE 2 DIABETES MELLITUS WITHOUT COMPLICATION, WITHOUT LONG-TERM CURRENT USE OF INSULIN (HCC): ICD-10-CM

## 2023-11-27 DIAGNOSIS — E78.5 DYSLIPIDEMIA: ICD-10-CM

## 2023-11-27 DIAGNOSIS — E55.9 VITAMIN D DEFICIENCY: ICD-10-CM

## 2023-11-27 DIAGNOSIS — E04.2 MULTIPLE THYROID NODULES: ICD-10-CM

## 2023-11-27 DIAGNOSIS — Z79.84 LONG TERM (CURRENT) USE OF ORAL HYPOGLYCEMIC DRUGS: ICD-10-CM

## 2023-11-27 LAB — PATHOLOGY CONSULT NOTE: NORMAL

## 2023-11-27 PROCEDURE — 10005 FNA BX W/US GDN 1ST LES: CPT | Performed by: INTERNAL MEDICINE

## 2023-11-27 PROCEDURE — 88173 CYTOPATH EVAL FNA REPORT: CPT

## 2023-11-28 NOTE — PROGRESS NOTES
POC US guided FNA procedure was completed today for the LML complex nodule measuring 1.1 cm   Please see endocrinologist procedure note  Patient tolerated procedure well without complaints.  Patient was advised that she will be contacted regarding the results and next plan  Patient was advised to follow up as planned with labs prior     José Luis Ragsdale M.D.

## 2023-11-29 NOTE — RESULT ENCOUNTER NOTE
Dear Sybil good news your biopsy came back negative for cancer it was benign.  Please follow-up as planned

## 2024-01-08 ENCOUNTER — OFFICE VISIT (OUTPATIENT)
Dept: MEDICAL GROUP | Facility: MEDICAL CENTER | Age: 49
End: 2024-01-08
Payer: COMMERCIAL

## 2024-01-08 VITALS
HEIGHT: 62 IN | HEART RATE: 118 BPM | DIASTOLIC BLOOD PRESSURE: 86 MMHG | OXYGEN SATURATION: 100 % | TEMPERATURE: 99.9 F | WEIGHT: 135 LBS | BODY MASS INDEX: 24.84 KG/M2 | SYSTOLIC BLOOD PRESSURE: 124 MMHG

## 2024-01-08 DIAGNOSIS — E78.5 DYSLIPIDEMIA: ICD-10-CM

## 2024-01-08 DIAGNOSIS — J39.9 UPPER RESPIRATORY DISEASE: ICD-10-CM

## 2024-01-08 DIAGNOSIS — J02.8 ACUTE PHARYNGITIS DUE TO OTHER SPECIFIED ORGANISMS: ICD-10-CM

## 2024-01-08 DIAGNOSIS — B00.1 RECURRENT COLD SORES: ICD-10-CM

## 2024-01-08 DIAGNOSIS — R06.2 WHEEZING: ICD-10-CM

## 2024-01-08 DIAGNOSIS — B37.9 YEAST INFECTION: ICD-10-CM

## 2024-01-08 DIAGNOSIS — U07.1 COVID-19: ICD-10-CM

## 2024-01-08 DIAGNOSIS — R09.81 CONGESTION OF NASAL SINUS: ICD-10-CM

## 2024-01-08 DIAGNOSIS — E11.9 CONTROLLED TYPE 2 DIABETES MELLITUS WITHOUT COMPLICATION, WITHOUT LONG-TERM CURRENT USE OF INSULIN (HCC): Chronic | ICD-10-CM

## 2024-01-08 LAB
FLUAV RNA SPEC QL NAA+PROBE: NEGATIVE
FLUBV RNA SPEC QL NAA+PROBE: NEGATIVE
RSV RNA SPEC QL NAA+PROBE: NEGATIVE
S PYO DNA SPEC NAA+PROBE: NOT DETECTED
SARS-COV-2 RNA RESP QL NAA+PROBE: POSITIVE

## 2024-01-08 PROCEDURE — 3074F SYST BP LT 130 MM HG: CPT | Performed by: INTERNAL MEDICINE

## 2024-01-08 PROCEDURE — 0241U POCT CEPHEID COV-2, FLU A/B, RSV - PCR: CPT | Performed by: INTERNAL MEDICINE

## 2024-01-08 PROCEDURE — 87651 STREP A DNA AMP PROBE: CPT | Performed by: INTERNAL MEDICINE

## 2024-01-08 PROCEDURE — 3079F DIAST BP 80-89 MM HG: CPT | Performed by: INTERNAL MEDICINE

## 2024-01-08 PROCEDURE — 99214 OFFICE O/P EST MOD 30 MIN: CPT | Performed by: INTERNAL MEDICINE

## 2024-01-08 RX ORDER — FLUCONAZOLE 150 MG/1
150 TABLET ORAL DAILY
Qty: 1 TABLET | Refills: 0 | Status: SHIPPED | OUTPATIENT
Start: 2024-01-08 | End: 2024-01-09

## 2024-01-08 RX ORDER — FLUTICASONE PROPIONATE 50 MCG
1 SPRAY, SUSPENSION (ML) NASAL DAILY
Qty: 48 G | Refills: 1 | Status: SHIPPED | OUTPATIENT
Start: 2024-01-08

## 2024-01-08 RX ORDER — ACYCLOVIR 400 MG/1
400 TABLET ORAL 2 TIMES DAILY
Qty: 10 TABLET | Refills: 0 | Status: SHIPPED | OUTPATIENT
Start: 2024-01-08 | End: 2024-01-13

## 2024-01-08 RX ORDER — ALBUTEROL SULFATE 90 UG/1
2 AEROSOL, METERED RESPIRATORY (INHALATION) EVERY 4 HOURS PRN
Qty: 1 EACH | Refills: 1 | Status: SHIPPED | OUTPATIENT
Start: 2024-01-08

## 2024-01-08 ASSESSMENT — FIBROSIS 4 INDEX: FIB4 SCORE: 0.56

## 2024-01-08 ASSESSMENT — PATIENT HEALTH QUESTIONNAIRE - PHQ9: CLINICAL INTERPRETATION OF PHQ2 SCORE: 0

## 2024-01-08 ASSESSMENT — ENCOUNTER SYMPTOMS
FEVER: 1
COUGH: 1
SORE THROAT: 1

## 2024-01-08 NOTE — LETTER
January 8, 2024    To Whom It May Concern:         This is confirmation that Sybil Manuel attended her scheduled appointment with Perla Dixon M.D. on 1/08/24.    She is currently being treated for upper respiratory infection.   Please excuse her from work from 1/8/2024 to 1/11/2024. She should return to work on 1/12/2024 if her symptoms resolve.          If you have any questions please do not hesitate to call me at the phone number listed below.    Sincerely,          Perla Dixon M.D.  [phone]

## 2024-01-08 NOTE — PROGRESS NOTES
Subjective:     CC:   Chief Complaint   Patient presents with    Headache    Pharyngitis    Body Aches     Diagnoses of Upper respiratory disease, Acute pharyngitis due to other specified organisms, Yeast infection, Wheezing, Congestion of nasal sinus, Recurrent cold sores, Controlled type 2 diabetes mellitus without complication, without long-term current use of insulin ([facility]), COVID-19, and Dyslipidemia were pertinent to this visit.      HPI: Sybil is a pleasant 48 y.o. female who presents today for evaluation of the following problems:    Problem   Acute Pharyngitis Due to Other Specified Organisms   Upper Respiratory Disease    Acute problem, symptoms started yesterday.  Her daughter recently had strep and flu.  Your symptoms started yesterday with fever, body aches, chills and sore throat and cough.    He has tried Mucinex DM night at home with minimal improvement.  She is up-to-date on flu vaccine, she only had initial COVID vaccines and did have a recent booster.    Obtain POCT strep/flu/COVID/RSV.    - cont symptomatic therapy as needed: OTC Tylenol as needed for pain/headache/fever, antihistamine/decongestant as needed for runny nose/congestion. Call or return to clinic prn if these symptoms worsen or fail to improve as anticipated.  - advised to maintain adequate hydration, regular handwashing, facemask, social distancing  - advised to monitor pulse oximetry at home and to maintain O2 sat 90% or above  - may consider trying over the counter supplements to help with your symptoms: vitamin C 500mg two times daily, vitamin D3 2000 international units daily, zinc 75-100mg daily, melatonin 5-10mg before bedtime as needed  - encouraged patient to get COVID-19 vaccine once she recovers from acute infection  - strict ED precautions discussed if worsening mental status, chest pain, shortness of breath, lack of urination       Dyslipidemia    Stop pravastatin while taking Paxlovid     Controlled Type 2 Diabetes  "Mellitus Without Complication, Without Long-Term Current Use of Insulin (Hcc)    This is a chronic condition.  Managed by endocrinology Dr. Allen.    Current regimen: Metformin 500 mg twice daily, Ozempic 0.5 mg weekly.  Last A1c:   Lab Results   Component Value Date/Time    HBA1C 5.8 09/21/2023 06:06 PM        Lab Results   Component Value Date/Time    HBA1C 5.9 (A) 01/23/2023 09:00 AM        Last Microalb/Cr ratio:   Lab Results   Component Value Date/Time    URCREAT 102.49 03/19/2022 1123    MICROALBUR 25.5 03/19/2022 1123    MALBCRT 249 (H) 03/19/2022 1123   Last diabetic foot exam: March 2023  Last retinal eye exam: 02/2023  ACEi/ARB: Not currently,  Statin: Currently, start pravastatin.  LDL: 122  Nightly foot checks: advised        Covid-19    With regard to treatment with Paxlovid:   I have provided the patient with the \"fact sheet for patients and parents/caregivers\".   I informed them of therapeutic alternatives to Paxlovid and the risks and benefits of those alternatives.   I informed them that they have the option to accept or refuse Paxlovid.   I informed them that Paxlovid is not FDA approved, but that it is authorized for use under emergency by the FDA.   I informed them of the known risks and benefits of Paxlovid and discussed the extent to which such risks and benefits are unknown.   The patient consents to undergoing treatment with Paxlovid/.            Past Medical History:   Diagnosis Date    Allergy     Anxiety     Lorazepam rarely    ASTHMA 9/3/2010    COVID-19 6/27/2022    Date of symptoms onset: 6/15/2022  Symptoms: Headache, fatigue, runny nose, sinusitis, cough productive with small amount of clear sputum. Fever: no fever  Date of COVID positive test: 06/15/2022, 6/24/2022  Medications tried: Dayquil, cold and flue OTC medicine, Tylenol  Home max temperature: normal  Home O2 saturation: has been able to tolerate PO but with reduced appetite. Normal urination.  CO    Depression 2008    no " antidepressant    Diabetes mellitus type II 9/3/2010    Elevated liver enzymes 2009    GERD (gastroesophageal reflux disease)     occasional    Hyperlipidemia     Multiple thyroid nodules 3/24/2023    Thyroid nodule 3/8/2023    Urinary tract infection, site not specified     Vitamin D deficiency 2009     Current Outpatient Medications Ordered in Epic   Medication Sig Dispense Refill    fluconazole (DIFLUCAN) 150 MG tablet Take 1 Tablet by mouth every day for 1 day. Okay to repeat one week later if symptoms persist 1 Tablet 0    fluticasone (FLONASE) 50 MCG/ACT nasal spray Administer 1 Spray into affected nostril(S) every day. 48 g 1    albuterol 108 (90 Base) MCG/ACT Aero Soln inhalation aerosol Inhale 2 Puffs every four hours as needed for Shortness of Breath. 1 Each 1    acyclovir (ZOVIRAX) 400 MG tablet Take 1 Tablet by mouth 2 times a day for 5 days. 10 Tablet 0    Nirmatrelvir&Ritonavir 300/100 20 x 150 MG & 10 x 100MG Tablet Therapy Pack Take 300 mg nirmatrelvir (two 150 mg tablets) with 100 mg ritonavir (one 100 mg tablet) by mouth, with all three tablets taken together twice daily for 5 days. 30 Each 0    pregabalin (LYRICA) 25 MG Cap Take 1 Capsule by mouth 3 times a day for 120 days. 90 Capsule 3    ondansetron (ZOFRAN ODT) 4 MG TABLET DISPERSIBLE Take 1 Tablet by mouth every 6 hours as needed for Nausea/Vomiting. 10 Tablet 0    Semaglutide,0.25 or 0.5MG/DOS, (OZEMPIC, 0.25 OR 0.5 MG/DOSE,) 2 MG/3ML Solution Pen-injector Inject 0.5 mg under the skin every 7 days. 9 mL 2    metFORMIN ER (GLUCOPHAGE XR) 500 MG TABLET SR 24 HR Take 2 Tablets by mouth 2 times a day. 360 Tablet 3    pravastatin (PRAVACHOL) 40 MG tablet Take 1 Tablet by mouth every evening. 90 Tablet 2    vitamin D2, Ergocalciferol, (DRISDOL) 1.25 MG (11851 UT) Cap capsule Take 1 Capsule by mouth every 7 days. 12 Capsule 2    LORATADINE Take 10 mg by mouth 1 time daily as needed.      Multiple Vitamins-Minerals (MULTIVITAL PO) Take  by mouth.    "    No current [facility]-ordered facility-administered medications on file.     Review of Systems   Constitutional:  Positive for fever.   HENT:  Positive for sore throat.    Respiratory:  Positive for cough.    All other systems reviewed and are negative.    Objective:     Exam:  /86 (BP Location: Right arm, Patient Position: Sitting, BP Cuff Size: Adult)   Pulse (!) 118   Temp 37.7 °C (99.9 °F) (Temporal)   Ht 1.575 m (5' 2\")   Wt 61.2 kg (135 lb)   SpO2 100%   BMI 24.69 kg/m²  Body mass index is 24.69 kg/m².    Physical Exam  Constitutional:       General: She is not in acute distress.     Appearance: She is ill-appearing. She is not toxic-appearing.   Cardiovascular:      Rate and Rhythm: Normal rate and regular rhythm.      Pulses: Normal pulses.      Heart sounds: Normal heart sounds. No murmur heard.  Pulmonary:      Effort: Pulmonary effort is normal. No respiratory distress.      Breath sounds: Normal breath sounds.   Neurological:      Mental Status: She is alert and oriented to person, place, and time.   Psychiatric:         Mood and Affect: Mood normal.       Labs: Reviewed lipid panel, CMP from 9/20/2023  Assessment & Plan:   Sybil  is a pleasant 48 y.o. female with the following -     Problem List Items Addressed This Visit       Controlled type 2 diabetes mellitus without complication, without long-term current use of insulin ([facility]) (Chronic)    Acute pharyngitis due to other specified organisms    COVID-19    Relevant Medications    Nirmatrelvir&Ritonavir 300/100 20 x 150 MG & 10 x 100MG Tablet Therapy Pack    Dyslipidemia     Stop pravastatin while taking Paxlovid.         Upper respiratory disease    Relevant Orders    POCT CoV-2, Flu A/B, RSV by PCR (Completed)    POCT GROUP A STREP, PCR (Completed)     Other Visit Diagnoses       Yeast infection        Relevant Medications    fluconazole (DIFLUCAN) 150 MG tablet    Wheezing        Relevant Medications    albuterol 108 (90 Base) MCG/ACT " Aero Soln inhalation aerosol    Congestion of nasal sinus        Relevant Medications    fluticasone (FLONASE) 50 MCG/ACT nasal spray    Recurrent cold sores        Relevant Medications    acyclovir (ZOVIRAX) 400 MG tablet          Return if symptoms worsen or fail to improve.    Please note that this dictation was created using voice recognition software. I have made every reasonable attempt to correct obvious errors, but I expect that there are errors of grammar and possibly content that I did not discover before finalizing the note.

## 2024-06-11 ENCOUNTER — TELEPHONE (OUTPATIENT)
Dept: HEALTH INFORMATION MANAGEMENT | Facility: OTHER | Age: 49
End: 2024-06-11
Payer: COMMERCIAL

## 2024-06-11 NOTE — TELEPHONE ENCOUNTER
----- Message from Perla Dixon M.D. sent at 6/10/2024  9:25 PM PDT -----  Regarding: DM fv  Please contact Sybil for DM fv  Thank you,

## 2024-06-11 NOTE — TELEPHONE ENCOUNTER
Sees Dr. Ragsdale endocrinology for DM f/u. Will be scheduling an appt with him.     Attempted to schedule an appt with Dr. MAGUIRE for c/o general fatigue, but she needs either a Friday appt or 5pm appt. I will reach out to the MA's to see if they can work something out for her.

## 2024-06-19 ENCOUNTER — APPOINTMENT (OUTPATIENT)
Dept: MEDICAL GROUP | Facility: MEDICAL CENTER | Age: 49
End: 2024-06-19
Payer: COMMERCIAL

## 2024-06-19 DIAGNOSIS — E11.9 CONTROLLED TYPE 2 DIABETES MELLITUS WITHOUT COMPLICATION, WITHOUT LONG-TERM CURRENT USE OF INSULIN (HCC): Chronic | ICD-10-CM

## 2024-07-20 DIAGNOSIS — E11.9 CONTROLLED TYPE 2 DIABETES MELLITUS WITHOUT COMPLICATION, WITHOUT LONG-TERM CURRENT USE OF INSULIN (HCC): ICD-10-CM

## 2024-07-21 RX ORDER — SEMAGLUTIDE 0.68 MG/ML
0.5 INJECTION, SOLUTION SUBCUTANEOUS
Qty: 9 ML | Refills: 0 | Status: SHIPPED | OUTPATIENT
Start: 2024-07-21

## 2024-09-10 ENCOUNTER — HOSPITAL ENCOUNTER (OUTPATIENT)
Dept: RADIOLOGY | Facility: MEDICAL CENTER | Age: 49
End: 2024-09-10
Attending: INTERNAL MEDICINE
Payer: COMMERCIAL

## 2024-09-10 ENCOUNTER — TELEPHONE (OUTPATIENT)
Dept: ENDOCRINOLOGY | Facility: MEDICAL CENTER | Age: 49
End: 2024-09-10

## 2024-09-10 DIAGNOSIS — Z12.31 VISIT FOR SCREENING MAMMOGRAM: ICD-10-CM

## 2024-09-10 PROCEDURE — 77067 SCR MAMMO BI INCL CAD: CPT

## 2024-09-10 NOTE — TELEPHONE ENCOUNTER
Left pt vm to let her know I've sent her info to a  that is going to look into her bill from November 2023 that was a biopsy. Bill was over $1,000. Let her know I would call back as soon as I heard back from specialist.

## 2024-09-11 ENCOUNTER — OFFICE VISIT (OUTPATIENT)
Dept: ENDOCRINOLOGY | Facility: MEDICAL CENTER | Age: 49
End: 2024-09-11
Attending: INTERNAL MEDICINE
Payer: COMMERCIAL

## 2024-09-11 ENCOUNTER — TELEPHONE (OUTPATIENT)
Dept: ENDOCRINOLOGY | Facility: MEDICAL CENTER | Age: 49
End: 2024-09-11

## 2024-09-11 VITALS
WEIGHT: 141 LBS | SYSTOLIC BLOOD PRESSURE: 108 MMHG | RESPIRATION RATE: 17 BRPM | BODY MASS INDEX: 25.95 KG/M2 | HEART RATE: 75 BPM | DIASTOLIC BLOOD PRESSURE: 66 MMHG | OXYGEN SATURATION: 96 % | HEIGHT: 62 IN

## 2024-09-11 DIAGNOSIS — E78.5 DYSLIPIDEMIA: ICD-10-CM

## 2024-09-11 DIAGNOSIS — Z79.84 LONG TERM (CURRENT) USE OF ORAL HYPOGLYCEMIC DRUGS: ICD-10-CM

## 2024-09-11 DIAGNOSIS — E11.9 CONTROLLED TYPE 2 DIABETES MELLITUS WITHOUT COMPLICATION, WITHOUT LONG-TERM CURRENT USE OF INSULIN (HCC): ICD-10-CM

## 2024-09-11 DIAGNOSIS — E04.2 MULTIPLE THYROID NODULES: ICD-10-CM

## 2024-09-11 DIAGNOSIS — E55.9 VITAMIN D DEFICIENCY: ICD-10-CM

## 2024-09-11 LAB
HBA1C MFR BLD: 6.3 % (ref ?–5.8)
POCT INT CON NEG: NEGATIVE
POCT INT CON POS: POSITIVE

## 2024-09-11 PROCEDURE — 83036 HEMOGLOBIN GLYCOSYLATED A1C: CPT | Performed by: INTERNAL MEDICINE

## 2024-09-11 PROCEDURE — 3074F SYST BP LT 130 MM HG: CPT | Performed by: INTERNAL MEDICINE

## 2024-09-11 PROCEDURE — 3078F DIAST BP <80 MM HG: CPT | Performed by: INTERNAL MEDICINE

## 2024-09-11 PROCEDURE — 99213 OFFICE O/P EST LOW 20 MIN: CPT | Performed by: INTERNAL MEDICINE

## 2024-09-11 PROCEDURE — 99214 OFFICE O/P EST MOD 30 MIN: CPT | Performed by: INTERNAL MEDICINE

## 2024-09-11 RX ORDER — METFORMIN HCL 500 MG
1000 TABLET, EXTENDED RELEASE 24 HR ORAL 2 TIMES DAILY
Qty: 360 TABLET | Refills: 2 | Status: SHIPPED | OUTPATIENT
Start: 2024-09-11

## 2024-09-11 RX ORDER — ERGOCALCIFEROL 1.25 MG/1
50000 CAPSULE, LIQUID FILLED ORAL
Qty: 12 CAPSULE | Refills: 2 | Status: SHIPPED | OUTPATIENT
Start: 2024-09-11

## 2024-09-11 RX ORDER — PREGABALIN 25 MG/1
1 CAPSULE ORAL 2 TIMES DAILY PRN
COMMUNITY

## 2024-09-11 RX ORDER — PRAVASTATIN SODIUM 40 MG
40 TABLET ORAL NIGHTLY
Qty: 90 TABLET | Refills: 2 | Status: SHIPPED | OUTPATIENT
Start: 2024-09-11

## 2024-09-11 RX ORDER — PREGABALIN 150 MG/1
CAPSULE ORAL
COMMUNITY

## 2024-09-11 RX ORDER — SEMAGLUTIDE 0.68 MG/ML
0.5 INJECTION, SOLUTION SUBCUTANEOUS
Qty: 9 ML | Refills: 2 | Status: SHIPPED | OUTPATIENT
Start: 2024-09-11

## 2024-09-11 ASSESSMENT — FIBROSIS 4 INDEX: FIB4 SCORE: 0.58

## 2024-09-11 NOTE — PROGRESS NOTES
"RN-CDE Note    Subjective:   Endocrinology Clinic Progress Note  PCP: Perla Dixon M.D.    HPI:  Sybil Manuel is a 49 y.o. old patient who is seen today by the Diabetes Nurse Specialist for review of her controlled type 2 diabetes.     Recent changes in health: fell and broke her right wrist.   DM:   Last A1c:   Lab Results   Component Value Date/Time    HBA1C 6.3 (A) 09/11/2024 04:09 PM      Previous A1c was 5.8 on 9/21/2023  A1C GOAL: < 7    Diabetes Medications:   Metformin 1000 mg bid  Ozempic 0.5 mg weekly    Potential Barriers to Care:      Adherence :  takes as prescribed      Side effects:  occasional diarrhea           Exercise: no regular exercise, sedentary, due to pain in her hand  Diet: \"healthy\" diet  in general  Patient's body mass index is 25.79 kg/m². Exercise and nutrition counseling were performed at this visit.    Glucose monitoring frequency: on occasion.     Hypoglycemic episodes: no      Last Retinal Exam: past due needs to make appointment.   Daily Foot Exam: Yes   Foot Exam:  Monofilament testing with a 10 gram force: sensation intact: intact bilaterally  Visual Inspection: Feet without maceration, ulcers, fissures.  Pedal pulses: intact bilaterally    Lab Results   Component Value Date/Time    MALBCRT see below 01/21/2023 11:06 AM    MICROALBUR <1.2 01/21/2023 11:06 AM        ACR Albumin/Creatinine Ratio goal <30     HTN:   Blood pressure goal <130/<80   Currently Rx ACE/ARB:  no    Dyslipidemia:    Lab Results   Component Value Date/Time    CHOLSTRLTOT 202 (H) 09/20/2023 07:21 AM     (H) 09/20/2023 07:21 AM    HDL 32 (A) 09/20/2023 07:21 AM    TRIGLYCERIDE 202 (H) 09/20/2023 07:21 AM         Currently Rx Statin:  Pravastatin 40 mg daily    She  reports that she has never smoked. She has never used smokeless tobacco.    Plan:     Discussed and educated on:   - All medications, side effects and compliance (discussed carefully)  - Annual eye examinations at " Ophthalmology  - Foot Care: what to look for when checking feet every day  - HbA1C: target  - Home glucose monitoring emphasized  - Weight control and daily exercise    Recommended medication changes: no changes

## 2024-09-11 NOTE — PROGRESS NOTES
CHIEF COMPLAINT: Patient is here for follow up of Type 2 Diabetes Mellitus    HPI:     Sybil Manuel is a 49 y.o. female with Type 2 Diabetes Mellitus here for follow up.    Labs from September 11, 2024 show A1c is 6.3%  Labs from 8/22/2023 show a1c is 5.8%  Labs from January 23, 2023 show A1c is 5.9%  Labs from 3/25/2022 show A1c was 4.9%    She is currently on   Metformin extended release 1000 mg twice a day   Ozempic 0.5 mg weekly    She denies any side effects with her medications  She forgot to get labs done prior to this visit      She previously had albuminuria but she was acutely ill with flu   Albuminuria resolved on retesting but she is overdue for labs  U ACR is less than 30 on January 2023        She has hyperlipidemia and is on Lipitor 20 mg daily  She denies muscle aches and muscle weakness or side effects  She denies a history of coronary artery disease and cerebrovascular disease  She is overdue for lipid profile  LDL cholesterol is 130 on 9/2023    She had an eye exam in this year at [facility] but we do not have records      She has thyroid nodules and last US was on 3/15/2023   This now showed a mixed or complex hypoechoic 1.1 cm nodule on rhe left mid lobe  She denies a family hx of thyroid cancer and radiation exposure  She is euthyroid  Her TSH was normal at 1.2 on Jan 2023       Ultrasound-guided biopsy of the thyroid nodule on November 2023 came back as benign            BG Diary:  Patient forgot her meter    Weight is stable      Diabetes Complications   Retinopathy: No known retinopathy.  Last eye exam: 2024 at [facility] no records  Neuropathy: Denies paresthesias or numbness in hands or feet. Denies any foot wounds.  Exercise: Minimal.  Diet: Fair.  Patient's medications, allergies, and social histories were reviewed and updated as appropriate.    ROS:     CONS:     No fever, no chills   EYES:     No diplopia, no blurry vision   CV:           No chest pain, no palpitations    PULM:     No SOB, no cough, no hemoptysis.   GI:            No nausea, no vomiting, no diarrhea, no constipation   ENDO:     No polyuria, no polydipsia, no heat intolerance, no cold intolerance       Past Medical History:  Problem List:  2024-01: Acute pharyngitis due to other specified organisms  2024-01: Upper respiratory disease  2023-08: Pain from implanted hardware  2023-03: Dyslipidemia  2023-03: Multiple thyroid nodules  2023-03: Thyroid nodule  2023-01: Closed fracture of right distal radius  2022-09: Controlled type 2 diabetes mellitus without complication,   without long-term current use of insulin ([facility])  2022-06: COVID-19  2022-03: Microalbuminuria  2021-12: Bilateral calf pain  2021-11: Morning headache  2021-09: Long term (current) use of oral hypoglycemic drugs  2021-04: Palpitations  2018-07: Hyperlipidemia associated with type 2 diabetes mellitus ([facility])  2018-07: Elevated liver enzymes  2016-07: Seasonal allergies  2011-07: Acne  2010-09: Diabetes mellitus, type II ([facility])  2010-09: Vitamin D deficiency  2010-09: ASTHMA  2010-07: Hyperglycemia      Past Surgical History:  Past Surgical History:   Procedure Laterality Date    ORIF, WRIST Right 1/27/2023    Procedure: RIGHT DISTAL RADIUS OPEN REDUCTION INTERNAL FIXATION, REPAIRS AS INDICATED;  Surgeon: Perri Orozco M.D.;  Location: [facility];  Service: Orthopedics    PRIMARY C SECTION      TUBAL COAGULATION LAPAROSCOPIC BILATERAL          Allergies:  Patient has no known allergies.     Social History:  Social History     Tobacco Use    Smoking status: Never    Smokeless tobacco: Never   Vaping Use    Vaping status: Never Used   Substance Use Topics    Alcohol use: Not Currently     Comment: 4 per year    Drug use: Never        Family History:   family history includes Arrythmia in her mother; Diabetes in her brother and maternal grandmother; Heart Disease in her mother; Hyperlipidemia in her brother and mother;  "Hypertension in her mother.      PHYSICAL EXAM:   OBJECTIVE:  Vital signs: /66 (BP Location: Left arm, Patient Position: Sitting)   Pulse 75   Resp 17   Ht 1.575 m (5' 2\")   Wt 64 kg (141 lb)   SpO2 96%   BMI 25.79 kg/m²   GENERAL: Well-developed, well-nourished in no apparent distress.   EYE:  No ocular asymmetry, PERRLA  HENT: Pink, moist mucous membranes.    NECK: No thyromegaly.   CARDIOVASCULAR:  No murmurs  LUNGS: Clear breath sounds  ABDOMEN: Soft, nontender   EXTREMITIES: No clubbing, cyanosis, or edema.   NEUROLOGICAL: No gross focal motor abnormalities   LYMPH: No cervical adenopathy palpated.   SKIN: No rashes, lesions.     Labs:  Lab Results   Component Value Date/Time    HBA1C 6.3 (A) 09/11/2024 04:09 PM        Lab Results   Component Value Date/Time    WBC 6.2 10/29/2022 12:27 PM    WBC 6.1 07/20/2010 09:00 AM    RBC 4.82 10/29/2022 12:27 PM    RBC 4.99 07/20/2010 09:00 AM    HEMOGLOBIN 13.0 10/29/2022 12:27 PM    MCV 85.7 10/29/2022 12:27 PM    MCV 90 07/20/2010 09:00 AM    MCH 27.0 10/29/2022 12:27 PM    MCH 28.5 07/20/2010 09:00 AM    MCHC 31.5 (L) 10/29/2022 12:27 PM    RDW 42.6 10/29/2022 12:27 PM    RDW 15.1 (H) 07/20/2010 09:00 AM    MPV 10.2 10/29/2022 12:27 PM       Lab Results   Component Value Date/Time    SODIUM 139 09/20/2023 07:21 AM    POTASSIUM 4.8 09/20/2023 07:21 AM    CHLORIDE 103 09/20/2023 07:21 AM    CO2 24 09/20/2023 07:21 AM    ANION 12.0 09/20/2023 07:21 AM    GLUCOSE 102 (H) 09/20/2023 07:21 AM    BUN 8 09/20/2023 07:21 AM    CREATININE 0.62 09/20/2023 07:21 AM    CREATININE 0.67 07/20/2010 09:00 AM    CALCIUM 8.9 09/20/2023 07:21 AM    ASTSGOT 18 09/20/2023 07:21 AM    ALTSGPT 15 09/20/2023 07:21 AM    TBILIRUBIN 0.2 09/20/2023 07:21 AM    ALBUMIN 4.3 09/20/2023 07:21 AM    TOTPROTEIN 7.7 09/20/2023 07:21 AM    GLOBULIN 3.4 09/20/2023 07:21 AM    AGRATIO 1.3 09/20/2023 07:21 AM       Lab Results   Component Value Date/Time    CHOLSTRLTOT 188 03/19/2022 1122    " TRIGLYCERIDE 144 03/19/2022 1122    HDL 37 (A) 03/19/2022 1122     (H) 03/19/2022 1122       Lab Results   Component Value Date/Time    MALBCRT see below 01/21/2023 11:06 AM    MICROALBUR <1.2 01/21/2023 11:06 AM        Lab Results   Component Value Date/Time    TSHULTRASEN 1.150 03/19/2022 1122     Lab Results   Component Value Date/Time    FREEDIR 1.02 07/20/2010 0900     No results found for: FREET3  No results found for: THYSTIMIG    IMAGING:  3/15/2023 4:38 PM     HISTORY/REASON FOR EXAM:  Palpable lump, follow up prior ultrasound.     TECHNIQUE/EXAM DESCRIPTION:  Ultrasound of the soft tissues of the head and neck.     COMPARISON:  1/28/2022 ultrasound of the soft tissues of the head and neck.     FINDINGS:  The thyroid gland is heterogeneous.  Vascularity is normal.     The right lobe of the thyroid gland measures 1.65 cm x 5.13 cm x 1.45 cm.  The left lobe of the thyroid gland measures 1.30 cm x 4.45 cm x 1.67 cm.  The isthmus measures 0.22 cm.     Nodules >= 1cm:     Nodule #1  Location:  Left  mid  Size:  1.1 x 0.8 x 0.7 cm  Composition:  Mixed-1  Echogenicity:  Hypoechoic-2  Shape:  Wider than tall-0  Margins:  Smooth-0  Echogenic Foci:  None-0     ACR TIRADS points/category:  3 - TR3 - Mildly Suspicious     Several additional subcentimeter thyroid nodules do not meet criteria for biopsy or follow-up.     IMPRESSION:     1.  Left thyroid nodule is stable since prior study. No new thyroid nodules or masses.  2.  Several additional subcentimeter thyroid nodules do not meet criteria for biopsy or follow-up.     ACR TI-RADS Recommendations  TR3 - No FNA or follow up recommended.     Recommendations based on the American College of Radiology Thyroid imaging, reporting and Data System (TI-RADS) 2017.     INTERPRETING LOCATION: [address] [place] [address], [id number]      ASSESSMENT/PLAN:     1. Controlled type 2 diabetes mellitus without complication, without long-term current use of insulin (HCC)  Controlled  A1c  is stable and controlled without lows  Continue metformin extended release 500 mg twice a day  Continue Ozempic 0.5 mg weekly  I resent her scripts for her DM meds  I recommend that she complete the previously ordered labs to check her lipids urine albumin and CMP  Follow-up in 7 months with complete labs    2. Dyslipidemia  Controlled  Continue atorvastatin  Repeat labs now and in 7 months    3. Multiple thyroid nodules  Stable reviewed thyroid ultrasound images with patient  She has a benign nodule of the left mid lobe  Recommend observation  I ordered another ultrasound this year for surveillance of her thyroid nodule I will update her    4. Vitamin D deficiency  Stable  Continue weekly vitamin D  I reordered her labs    5. Long term (current) use of oral hypoglycemic drugs  Patient is on oral agents and a GLP-1 for type 2 diabetes management      Return in about 7 months (around 4/11/2025).        Thank you kindly for allowing me to participate in the diabetes care plan for this patient.    José Luis Ragsdale MD, SENAIT, ECNU      CC:   Pcp Pt States None

## 2024-09-11 NOTE — TELEPHONE ENCOUNTER
Left vm for patient regarding biopsy bill she received back in nov. Pt did not meed deductible of 1000 so that is why she is receiving bill.

## 2024-10-01 DIAGNOSIS — R06.2 WHEEZING: ICD-10-CM

## 2024-10-02 ENCOUNTER — TELEPHONE (OUTPATIENT)
Dept: ENDOCRINOLOGY | Facility: MEDICAL CENTER | Age: 49
End: 2024-10-02

## 2024-10-02 ENCOUNTER — HOSPITAL ENCOUNTER (OUTPATIENT)
Dept: LAB | Facility: MEDICAL CENTER | Age: 49
End: 2024-10-02
Attending: INTERNAL MEDICINE
Payer: COMMERCIAL

## 2024-10-02 DIAGNOSIS — Z79.84 LONG TERM (CURRENT) USE OF ORAL HYPOGLYCEMIC DRUGS: ICD-10-CM

## 2024-10-02 DIAGNOSIS — E04.2 MULTIPLE THYROID NODULES: ICD-10-CM

## 2024-10-02 DIAGNOSIS — E55.9 VITAMIN D DEFICIENCY: ICD-10-CM

## 2024-10-02 DIAGNOSIS — E78.5 DYSLIPIDEMIA: ICD-10-CM

## 2024-10-02 DIAGNOSIS — E11.9 CONTROLLED TYPE 2 DIABETES MELLITUS WITHOUT COMPLICATION, WITHOUT LONG-TERM CURRENT USE OF INSULIN (HCC): ICD-10-CM

## 2024-10-02 LAB
25(OH)D3 SERPL-MCNC: 18 NG/ML (ref 30–100)
ALBUMIN SERPL BCP-MCNC: 4.5 G/DL (ref 3.2–4.9)
ALBUMIN/GLOB SERPL: 1.2 G/DL
ALP SERPL-CCNC: 58 U/L (ref 30–99)
ALT SERPL-CCNC: 25 U/L (ref 2–50)
ANION GAP SERPL CALC-SCNC: 13 MMOL/L (ref 7–16)
AST SERPL-CCNC: 27 U/L (ref 12–45)
BILIRUB SERPL-MCNC: 0.2 MG/DL (ref 0.1–1.5)
BUN SERPL-MCNC: 12 MG/DL (ref 8–22)
CALCIUM ALBUM COR SERPL-MCNC: 9.2 MG/DL (ref 8.5–10.5)
CALCIUM SERPL-MCNC: 9.6 MG/DL (ref 8.5–10.5)
CHLORIDE SERPL-SCNC: 100 MMOL/L (ref 96–112)
CHOLEST SERPL-MCNC: 222 MG/DL (ref 100–199)
CO2 SERPL-SCNC: 24 MMOL/L (ref 20–33)
CREAT SERPL-MCNC: 0.55 MG/DL (ref 0.5–1.4)
CREAT UR-MCNC: 96.5 MG/DL
GFR SERPLBLD CREATININE-BSD FMLA CKD-EPI: 111 ML/MIN/1.73 M 2
GLOBULIN SER CALC-MCNC: 3.7 G/DL (ref 1.9–3.5)
GLUCOSE SERPL-MCNC: 130 MG/DL (ref 65–99)
HDLC SERPL-MCNC: 39 MG/DL
LDLC SERPL CALC-MCNC: 155 MG/DL
MICROALBUMIN UR-MCNC: <1.2 MG/DL
MICROALBUMIN/CREAT UR: NORMAL MG/G (ref 0–30)
POTASSIUM SERPL-SCNC: 4.4 MMOL/L (ref 3.6–5.5)
PROT SERPL-MCNC: 8.2 G/DL (ref 6–8.2)
SODIUM SERPL-SCNC: 137 MMOL/L (ref 135–145)
T4 FREE SERPL-MCNC: 1.08 NG/DL (ref 0.93–1.7)
TRIGL SERPL-MCNC: 142 MG/DL (ref 0–149)
TSH SERPL-ACNC: 2.16 UIU/ML (ref 0.35–5.5)

## 2024-10-02 PROCEDURE — 82570 ASSAY OF URINE CREATININE: CPT

## 2024-10-02 PROCEDURE — 36415 COLL VENOUS BLD VENIPUNCTURE: CPT

## 2024-10-02 PROCEDURE — 80061 LIPID PANEL: CPT

## 2024-10-02 PROCEDURE — 84443 ASSAY THYROID STIM HORMONE: CPT

## 2024-10-02 PROCEDURE — 80053 COMPREHEN METABOLIC PANEL: CPT

## 2024-10-02 PROCEDURE — 82043 UR ALBUMIN QUANTITATIVE: CPT

## 2024-10-02 PROCEDURE — 82306 VITAMIN D 25 HYDROXY: CPT

## 2024-10-02 PROCEDURE — 84439 ASSAY OF FREE THYROXINE: CPT

## 2024-10-02 RX ORDER — ALBUTEROL SULFATE 90 UG/1
2 INHALANT RESPIRATORY (INHALATION) EVERY 4 HOURS PRN
Qty: 9 G | Refills: 4 | Status: SHIPPED | OUTPATIENT
Start: 2024-10-02

## 2024-10-21 ENCOUNTER — TELEPHONE (OUTPATIENT)
Dept: ENDOCRINOLOGY | Facility: MEDICAL CENTER | Age: 49
End: 2024-10-21
Payer: COMMERCIAL

## 2024-12-05 ENCOUNTER — OFFICE VISIT (OUTPATIENT)
Dept: MEDICAL GROUP | Age: 49
End: 2024-12-05
Payer: COMMERCIAL

## 2024-12-05 VITALS
RESPIRATION RATE: 20 BRPM | TEMPERATURE: 98.9 F | HEART RATE: 82 BPM | BODY MASS INDEX: 25.73 KG/M2 | HEIGHT: 62 IN | WEIGHT: 139.8 LBS | DIASTOLIC BLOOD PRESSURE: 62 MMHG | SYSTOLIC BLOOD PRESSURE: 110 MMHG | OXYGEN SATURATION: 98 %

## 2024-12-05 DIAGNOSIS — L30.9 DERMATITIS: ICD-10-CM

## 2024-12-05 DIAGNOSIS — R11.2 NAUSEA AND VOMITING, UNSPECIFIED VOMITING TYPE: ICD-10-CM

## 2024-12-05 DIAGNOSIS — L81.9 DISCOLORATION OF SKIN: ICD-10-CM

## 2024-12-05 DIAGNOSIS — R11.0 NAUSEA: ICD-10-CM

## 2024-12-05 PROCEDURE — 3074F SYST BP LT 130 MM HG: CPT | Performed by: INTERNAL MEDICINE

## 2024-12-05 PROCEDURE — 99214 OFFICE O/P EST MOD 30 MIN: CPT | Performed by: INTERNAL MEDICINE

## 2024-12-05 PROCEDURE — 3078F DIAST BP <80 MM HG: CPT | Performed by: INTERNAL MEDICINE

## 2024-12-05 RX ORDER — OXYCODONE HYDROCHLORIDE 5 MG/1
5 TABLET ORAL EVERY 6 HOURS PRN
COMMUNITY
End: 2024-12-10

## 2024-12-05 RX ORDER — TRIAMCINOLONE ACETONIDE 1 MG/G
1 CREAM TOPICAL 2 TIMES DAILY
Qty: 45 G | Refills: 0 | Status: SHIPPED | OUTPATIENT
Start: 2024-12-05

## 2024-12-05 NOTE — LETTER
[facility]  Perla Dixon M.D.  [address]   [place] [address]  Fax: [phone]   Authorization for Release/Disclosure of   Protected Health Information   Name: TREY MANUEL : 1975 SSN: [id number]   Address: [address] [place] [id number] Phone:    [phone] (home) [phone] (work)   I authorize the entity listed below to release/disclose the PHI below to:   [facility]/Perla Dixon M.D. and Perla Dixon M.D.   Provider or Entity Name:  [facility]    Address   City, State, [facility]   Phone:      Fax:     Reason for request: continuity of care   Information to be released:    [ x ] LAST COLONOSCOPY,  including any PATH REPORT and follow-up  [  ] LAST FIT/COLOGUARD RESULT [  ] LAST DEXA  [  ] LAST MAMMOGRAM  [  ] LAST PAP  [  ] LAST LABS [  ] RETINA EXAM REPORT  [  ] IMMUNIZATION RECORDS  [ x ] Release all info      [  ] Check here and initial the line next to each item to release ALL health information INCLUDING  _____ Care and treatment for drug and / or alcohol abuse  _____ HIV testing, infection status, or AIDS  _____ Genetic Testing    DATES OF SERVICE OR TIME PERIOD TO BE DISCLOSED: _____________  I understand and acknowledge that:  * This Authorization may be revoked at any time by you in writing, except if your health information has already been used or disclosed.  * Your health information that will be used or disclosed as a result of you signing this authorization could be re-disclosed by the recipient. If this occurs, your re-disclosed health information may no longer be protected by State or Federal laws.  * You may refuse to sign this Authorization. Your refusal will not affect your ability to obtain treatment.  * This Authorization becomes effective upon signing and will  on (date) __________.      If no date is indicated, this Authorization will  one (1) year from the signature date.    Name: Trey Manuel  Signature: Date:    12/5/2024     PLEASE FAX REQUESTED RECORDS BACK TO: [phone]

## 2024-12-05 NOTE — PROGRESS NOTES
"CC:   Chief Complaint   Patient presents with    Dizziness     X2 months, \"Nausea, anxiety, headache\"    Bump     X1 year, Left side of back, \" Started as a very small spot, and has now grown in size. Spot is also itchy.\"      Diagnoses of Nausea, Discoloration of skin, Dermatitis, and Nausea and vomiting, unspecified vomiting type were pertinent to this visit.  Verbal consent was acquired by the patient to use [employer] ambient listening note generation during this visit Yes     History of Present Illness  Sybil is a pleasant 49 y.o. female who presents today to discuss the following problems:     She reports experiencing significant nausea and anxiety, which have been disrupting her sleep. She has been taking Zofran for her nausea but is concerned that her medications may be causing it. She last took Lyrica 2 days ago and has been feeling nauseated since then, particularly in the mornings. She also experiences vomiting, sometimes twice a day, which she initially attributed to her diet. Additionally, she has a history of mucus discharge from her throat, which she describes as green or yellow, similar to what one might experience with a respiratory infection. She uses Flonase spray as needed and has not taken Prilosec or omeprazole.    She mentions a dark discoloration on her back, approximately the size of her palm, which has been causing itchiness and appears to be growing. This discoloration has been present since she injured her hand in 01/2023.    She is currently under the care of a pain specialist for Complex Regional Pain Syndrome (CRPS) in her hand, which involves medication, surgery, therapy, and pain management. Her current medications include Lyrica, oxycodone, and ibuprofen. She takes oxycodone sparingly, with a supply of 9 tablets lasting her about 2 months. She uses Lyrica as needed but notes that it makes her feel tired and unfocused, so she avoids taking it when she needs to work. She started " Lyrica over a year ago at a low dose, which was gradually increased. She experiences nausea and headaches even when not taking Lyrica and notes that her hand pain intensifies with weather changes. She also reports feeling anxious about the possibility of falling again.    She had a colonoscopy about 5 years ago and still has her gallbladder.     She recently traveled to [place] to visit her father and was cautious about her diet there. She has not had a diabetic eye exam.    Past Medical History:   Diagnosis Date    Allergy     Anxiety     Lorazepam rarely    ASTHMA 9/3/2010    COVID-19 6/27/2022    Date of symptoms onset: 6/15/2022  Symptoms: Headache, fatigue, runny nose, sinusitis, cough productive with small amount of clear sputum. Fever: no fever  Date of COVID positive test: 06/15/2022, 6/24/2022  Medications tried: Dayquil, cold and flue OTC medicine, Tylenol  Home max temperature: normal  Home O2 saturation: has been able to tolerate PO but with reduced appetite. Normal urination.  CO    Depression 2008    no antidepressant    Diabetes mellitus type II 9/3/2010    Elevated liver enzymes 2009    GERD (gastroesophageal reflux disease)     occasional    Hyperlipidemia     Multiple thyroid nodules 3/24/2023    Thyroid nodule 3/8/2023    Urinary tract infection, site not specified     Vitamin D deficiency 2009     Current Outpatient Medications Ordered in Epic   Medication Sig Dispense Refill    oxyCODONE immediate-release (ROXICODONE) 5 MG Tab Take 1 Tablet by mouth 1 time a day as needed.      triamcinolone acetonide (KENALOG) 0.1 % Cream Apply 1 Application topically 2 times a day. 45 g 0    omeprazole (PRILOSEC) 20 MG delayed-release capsule Take 1 Capsule by mouth every day. 30 Capsule 1    albuterol 108 (90 Base) MCG/ACT Aero Soln inhalation aerosol INHALE 2 PUFFS BY MOUTH EVERY 4 HOURS AS NEEDED FOR SHORTNESS OF BREATH 9 g 4    pregabalin (LYRICA) 25 MG Cap Take 1 Capsule by mouth 2 times a day as needed.    "   pregabalin (LYRICA) 150 MG Cap TAKE 1 CAPSULE BY MOUTH ONCE DAILY AT BEDTIME FOR 30 DAYS      Semaglutide,0.25 or 0.5MG/DOS, (OZEMPIC, 0.25 OR 0.5 MG/DOSE,) 2 MG/3ML Solution Pen-injector Inject 0.5 mg under the skin every 7 days. 9 mL 2    metFORMIN ER (GLUCOPHAGE XR) 500 MG TABLET SR 24 HR Take 2 Tablets by mouth 2 times a day. 360 Tablet 2    pravastatin (PRAVACHOL) 40 MG tablet Take 1 Tablet by mouth every evening. 90 Tablet 2    fluticasone (FLONASE) 50 MCG/ACT nasal spray Administer 1 Spray into affected nostril(S) every day. 48 g 1    ondansetron (ZOFRAN ODT) 4 MG TABLET DISPERSIBLE Take 1 Tablet by mouth every 6 hours as needed for Nausea/Vomiting. 10 Tablet 0    LORATADINE Take 10 mg by mouth 1 time daily as needed.      vitamin D2, Ergocalciferol, (DRISDOL) 1.25 MG (73034 UT) Cap capsule Take 1 Capsule by mouth every 7 days. (Patient not taking: Reported on 12/5/2024) 12 Capsule 2    Multiple Vitamins-Minerals (MULTIVITAL PO) Take  by mouth. (Patient not taking: Reported on 12/5/2024)       No current [facility]-ordered facility-administered medications on file.     Review of Systems   All other systems reviewed and are negative.    Objective:     Exam:  /62 (BP Location: Right arm, Patient Position: Sitting, BP Cuff Size: Adult)   Pulse 82   Temp 37.2 °C (98.9 °F) (Temporal)   Resp 20   Ht 1.575 m (5' 2\")   Wt 63.4 kg (139 lb 12.8 oz)   SpO2 98%   BMI 25.57 kg/m²  Body mass index is 25.57 kg/m².    Physical Exam  Constitutional:       Appearance: Normal appearance.   HENT:      Head: Normocephalic and atraumatic.   Cardiovascular:      Rate and Rhythm: Normal rate.   Pulmonary:      Effort: Pulmonary effort is normal. No respiratory distress.   Skin:            Comments: She has a large, irregularly shaped patch on the lower left lumbar area, approximately 6 x 8 cm, which is slightly discolored and pruritic.   Neurological:      General: No focal deficit present.      Mental Status: She is alert " and oriented to person, place, and time.   Psychiatric:         Mood and Affect: Mood normal.         Behavior: Behavior normal.         Thought Content: Thought content normal.         Judgment: Judgment normal.       Assessment & Plan:   Sybil  is a pleasant 49 y.o. female with the following -   Assessment & Plan  1. Nausea and vomiting.  The symptoms are likely multifactorial, potentially due to GERD, acid reflux, gallbladder issues, possible gastroparesis, or medication side effects. Ozempic, which she is currently taking, could also be a contributing factor. A trial of omeprazole will be initiated. Blood tests will be conducted to evaluate kidney and liver function, as well as lipase levels. A gastric emptying study and an ultrasound of the right upper quadrant will be performed. An H. pylori test will also be conducted, with the patient instructed to take the test before starting omeprazole. She is advised to schedule an appointment with a gastroenterologist.    2. Type 2 diabetes.  This is a chronic condition. She is under the care of Dr. Allen and her most recent A1c is 6.3 percent. She is currently on Ozempic and metformin, and her condition appears to be well controlled. She will continue to follow up with her endocrinologist.    3. Complex regional pain syndrome.  She is currently being treated by a pain specialist with Lyrica, oxycodone, and ibuprofen. This is a complex issue and it is uncertain whether her medications are contributing to her nausea.    4. Dermatitis.  She has a large, irregularly shaped patch on the lower left lumbar area, approximately 6 x 8 cm, which is slightly discolored and pruritic. This could possibly be dermatitis. A trial of Kenalog topical will be initiated and a referral to dermatology will be made.    Follow-up  Return in 2 to 3 weeks for follow up.    Problem List Items Addressed This Visit       Nausea    Relevant Medications    omeprazole (PRILOSEC) 20 MG  delayed-release capsule    Other Relevant Orders    NM-GASTRIC EMPTYING    Referral to Gastroenterology    US-RU    CBC WITH DIFFERENTIAL    LIPASE    Comp Metabolic Panel    HELICOBACTER PYLORI BREATH TEST    LIPASE     Other Visit Diagnoses       Discoloration of skin        Relevant Orders    Referral to Dermatology    Dermatitis        Relevant Medications    triamcinolone acetonide (KENALOG) 0.1 % Cream    Other Relevant Orders    Referral to Dermatology    Nausea and vomiting, unspecified vomiting type        Relevant Medications    omeprazole (PRILOSEC) 20 MG delayed-release capsule    Other Relevant Orders    NM-GASTRIC EMPTYING    Referral to Gastroenterology    US-RUQ    CBC WITH DIFFERENTIAL    LIPASE    Comp Metabolic Panel    HELICOBACTER PYLORI BREATH TEST    LIPASE          Return if symptoms worsen or fail to improve, for 2-3 weeks.    Please note that this dictation was created using voice recognition software. I have made every reasonable attempt to correct obvious errors, but I expect that there are errors of grammar and possibly content that I did not discover before finalizing the note.

## 2024-12-06 ENCOUNTER — HOSPITAL ENCOUNTER (OUTPATIENT)
Dept: LAB | Facility: MEDICAL CENTER | Age: 49
End: 2024-12-06
Attending: INTERNAL MEDICINE
Payer: COMMERCIAL

## 2024-12-06 DIAGNOSIS — R11.0 NAUSEA: ICD-10-CM

## 2024-12-06 DIAGNOSIS — R11.2 NAUSEA AND VOMITING, UNSPECIFIED VOMITING TYPE: ICD-10-CM

## 2024-12-06 LAB
ALBUMIN SERPL BCP-MCNC: 4.4 G/DL (ref 3.2–4.9)
ALBUMIN/GLOB SERPL: 1.3 G/DL
ALP SERPL-CCNC: 66 U/L (ref 30–99)
ALT SERPL-CCNC: 17 U/L (ref 2–50)
ANION GAP SERPL CALC-SCNC: 11 MMOL/L (ref 7–16)
ANISOCYTOSIS BLD QL SMEAR: ABNORMAL
AST SERPL-CCNC: 18 U/L (ref 12–45)
BASOPHILS # BLD AUTO: 1.1 % (ref 0–1.8)
BASOPHILS # BLD: 0.05 K/UL (ref 0–0.12)
BILIRUB SERPL-MCNC: 0.2 MG/DL (ref 0.1–1.5)
BUN SERPL-MCNC: 10 MG/DL (ref 8–22)
CALCIUM ALBUM COR SERPL-MCNC: 8.7 MG/DL (ref 8.5–10.5)
CALCIUM SERPL-MCNC: 9 MG/DL (ref 8.5–10.5)
CHLORIDE SERPL-SCNC: 104 MMOL/L (ref 96–112)
CO2 SERPL-SCNC: 22 MMOL/L (ref 20–33)
COMMENT 1642: NORMAL
CREAT SERPL-MCNC: 0.52 MG/DL (ref 0.5–1.4)
EOSINOPHIL # BLD AUTO: 0.17 K/UL (ref 0–0.51)
EOSINOPHIL NFR BLD: 3.7 % (ref 0–6.9)
ERYTHROCYTE [DISTWIDTH] IN BLOOD BY AUTOMATED COUNT: 43.9 FL (ref 35.9–50)
GFR SERPLBLD CREATININE-BSD FMLA CKD-EPI: 113 ML/MIN/1.73 M 2
GLOBULIN SER CALC-MCNC: 3.3 G/DL (ref 1.9–3.5)
GLUCOSE SERPL-MCNC: 120 MG/DL (ref 65–99)
HCT VFR BLD AUTO: 29.1 % (ref 37–47)
HGB BLD-MCNC: 7.7 G/DL (ref 12–16)
HYPOCHROMIA BLD QL SMEAR: ABNORMAL
IMM GRANULOCYTES # BLD AUTO: 0.01 K/UL (ref 0–0.11)
IMM GRANULOCYTES NFR BLD AUTO: 0.2 % (ref 0–0.9)
LIPASE SERPL-CCNC: 31 U/L (ref 11–82)
LYMPHOCYTES # BLD AUTO: 1.73 K/UL (ref 1–4.8)
LYMPHOCYTES NFR BLD: 38 % (ref 22–41)
MCH RBC QN AUTO: 17.4 PG (ref 27–33)
MCHC RBC AUTO-ENTMCNC: 26.5 G/DL (ref 32.2–35.5)
MCV RBC AUTO: 65.8 FL (ref 81.4–97.8)
MICROCYTES BLD QL SMEAR: ABNORMAL
MONOCYTES # BLD AUTO: 0.52 K/UL (ref 0–0.85)
MONOCYTES NFR BLD AUTO: 11.4 % (ref 0–13.4)
MORPHOLOGY BLD-IMP: NORMAL
NEUTROPHILS # BLD AUTO: 2.07 K/UL (ref 1.82–7.42)
NEUTROPHILS NFR BLD: 45.6 % (ref 44–72)
NRBC # BLD AUTO: 0 K/UL
NRBC BLD-RTO: 0 /100 WBC (ref 0–0.2)
OVALOCYTES BLD QL SMEAR: NORMAL
PLATELET # BLD AUTO: 586 K/UL (ref 164–446)
PLATELET BLD QL SMEAR: NORMAL
PMV BLD AUTO: 9.6 FL (ref 9–12.9)
POIKILOCYTOSIS BLD QL SMEAR: NORMAL
POTASSIUM SERPL-SCNC: 5.1 MMOL/L (ref 3.6–5.5)
PROT SERPL-MCNC: 7.7 G/DL (ref 6–8.2)
RBC # BLD AUTO: 4.42 M/UL (ref 4.2–5.4)
RBC BLD AUTO: PRESENT
SODIUM SERPL-SCNC: 137 MMOL/L (ref 135–145)
WBC # BLD AUTO: 4.6 K/UL (ref 4.8–10.8)

## 2024-12-06 PROCEDURE — 85025 COMPLETE CBC W/AUTO DIFF WBC: CPT

## 2024-12-06 PROCEDURE — 83690 ASSAY OF LIPASE: CPT

## 2024-12-06 PROCEDURE — 83013 H PYLORI (C-13) BREATH: CPT

## 2024-12-06 PROCEDURE — 80053 COMPREHEN METABOLIC PANEL: CPT

## 2024-12-06 PROCEDURE — 36415 COLL VENOUS BLD VENIPUNCTURE: CPT

## 2024-12-07 LAB — UREA BREATH TEST QL: POSITIVE

## 2024-12-09 ENCOUNTER — TELEPHONE (OUTPATIENT)
Dept: MEDICAL GROUP | Age: 49
End: 2024-12-09
Payer: COMMERCIAL

## 2024-12-09 ENCOUNTER — HOSPITAL ENCOUNTER (OUTPATIENT)
Dept: RADIOLOGY | Facility: MEDICAL CENTER | Age: 49
End: 2024-12-09
Attending: INTERNAL MEDICINE
Payer: COMMERCIAL

## 2024-12-09 DIAGNOSIS — R11.2 NAUSEA AND VOMITING, UNSPECIFIED VOMITING TYPE: ICD-10-CM

## 2024-12-09 DIAGNOSIS — R11.0 NAUSEA: ICD-10-CM

## 2024-12-09 PROCEDURE — A9541 TC99M SULFUR COLLOID: HCPCS

## 2024-12-09 NOTE — TELEPHONE ENCOUNTER
Called and left a voicemail, asking the patient to please give us a call as soon as possible, Informed her we would like to go over test results with her.

## 2024-12-10 ENCOUNTER — TELEPHONE (OUTPATIENT)
Dept: MEDICAL GROUP | Age: 49
End: 2024-12-10
Payer: COMMERCIAL

## 2024-12-10 ENCOUNTER — HOSPITAL ENCOUNTER (INPATIENT)
Facility: MEDICAL CENTER | Age: 49
LOS: 2 days | DRG: 378 | End: 2024-12-12
Attending: STUDENT IN AN ORGANIZED HEALTH CARE EDUCATION/TRAINING PROGRAM | Admitting: STUDENT IN AN ORGANIZED HEALTH CARE EDUCATION/TRAINING PROGRAM
Payer: COMMERCIAL

## 2024-12-10 DIAGNOSIS — D50.0 IRON DEFICIENCY ANEMIA DUE TO CHRONIC BLOOD LOSS: ICD-10-CM

## 2024-12-10 DIAGNOSIS — K92.2 UPPER GI BLEED: ICD-10-CM

## 2024-12-10 DIAGNOSIS — A04.8 H. PYLORI INFECTION: ICD-10-CM

## 2024-12-10 DIAGNOSIS — K27.9 H PYLORI ULCER: ICD-10-CM

## 2024-12-10 DIAGNOSIS — D64.9 ACUTE ANEMIA: ICD-10-CM

## 2024-12-10 DIAGNOSIS — B96.81 H PYLORI ULCER: ICD-10-CM

## 2024-12-10 PROBLEM — D62 ACUTE BLOOD LOSS ANEMIA: Status: ACTIVE | Noted: 2024-12-10

## 2024-12-10 LAB
ALBUMIN SERPL BCP-MCNC: 4.8 G/DL (ref 3.2–4.9)
ALBUMIN/GLOB SERPL: 1.4 G/DL
ALP SERPL-CCNC: 68 U/L (ref 30–99)
ALT SERPL-CCNC: 30 U/L (ref 2–50)
ANION GAP SERPL CALC-SCNC: 14 MMOL/L (ref 7–16)
AST SERPL-CCNC: 26 U/L (ref 12–45)
BASOPHILS # BLD AUTO: 1 % (ref 0–1.8)
BASOPHILS # BLD: 0.06 K/UL (ref 0–0.12)
BILIRUB SERPL-MCNC: <0.2 MG/DL (ref 0.1–1.5)
BUN SERPL-MCNC: 11 MG/DL (ref 8–22)
CALCIUM ALBUM COR SERPL-MCNC: 8.8 MG/DL (ref 8.5–10.5)
CALCIUM SERPL-MCNC: 9.4 MG/DL (ref 8.5–10.5)
CHLORIDE SERPL-SCNC: 100 MMOL/L (ref 96–112)
CO2 SERPL-SCNC: 23 MMOL/L (ref 20–33)
CREAT SERPL-MCNC: 0.54 MG/DL (ref 0.5–1.4)
EOSINOPHIL # BLD AUTO: 0.1 K/UL (ref 0–0.51)
EOSINOPHIL NFR BLD: 1.7 % (ref 0–6.9)
ERYTHROCYTE [DISTWIDTH] IN BLOOD BY AUTOMATED COUNT: 43.4 FL (ref 35.9–50)
GFR SERPLBLD CREATININE-BSD FMLA CKD-EPI: 112 ML/MIN/1.73 M 2
GLOBULIN SER CALC-MCNC: 3.4 G/DL (ref 1.9–3.5)
GLUCOSE SERPL-MCNC: 204 MG/DL (ref 65–99)
HCT VFR BLD AUTO: 25.9 % (ref 37–47)
HCT VFR BLD AUTO: 27.5 % (ref 37–47)
HGB BLD-MCNC: 7.4 G/DL (ref 12–16)
HGB BLD-MCNC: 7.8 G/DL (ref 12–16)
IMM GRANULOCYTES # BLD AUTO: 0.01 K/UL (ref 0–0.11)
IMM GRANULOCYTES NFR BLD AUTO: 0.2 % (ref 0–0.9)
LIPASE SERPL-CCNC: 44 U/L (ref 11–82)
LYMPHOCYTES # BLD AUTO: 2.17 K/UL (ref 1–4.8)
LYMPHOCYTES NFR BLD: 37.2 % (ref 22–41)
MCH RBC QN AUTO: 18.4 PG (ref 27–33)
MCHC RBC AUTO-ENTMCNC: 28.4 G/DL (ref 32.2–35.5)
MCV RBC AUTO: 65 FL (ref 81.4–97.8)
MONOCYTES # BLD AUTO: 0.47 K/UL (ref 0–0.85)
MONOCYTES NFR BLD AUTO: 8.1 % (ref 0–13.4)
NEUTROPHILS # BLD AUTO: 3.02 K/UL (ref 1.82–7.42)
NEUTROPHILS NFR BLD: 51.8 % (ref 44–72)
NRBC # BLD AUTO: 0 K/UL
NRBC BLD-RTO: 0 /100 WBC (ref 0–0.2)
PLATELET # BLD AUTO: 542 K/UL (ref 164–446)
PMV BLD AUTO: 9.2 FL (ref 9–12.9)
POTASSIUM SERPL-SCNC: 3.6 MMOL/L (ref 3.6–5.5)
PROT SERPL-MCNC: 8.2 G/DL (ref 6–8.2)
RBC # BLD AUTO: 4.23 M/UL (ref 4.2–5.4)
SODIUM SERPL-SCNC: 137 MMOL/L (ref 135–145)
WBC # BLD AUTO: 5.8 K/UL (ref 4.8–10.8)

## 2024-12-10 PROCEDURE — 36415 COLL VENOUS BLD VENIPUNCTURE: CPT

## 2024-12-10 PROCEDURE — 85025 COMPLETE CBC W/AUTO DIFF WBC: CPT

## 2024-12-10 PROCEDURE — 700102 HCHG RX REV CODE 250 W/ 637 OVERRIDE(OP): Performed by: STUDENT IN AN ORGANIZED HEALTH CARE EDUCATION/TRAINING PROGRAM

## 2024-12-10 PROCEDURE — 85018 HEMOGLOBIN: CPT

## 2024-12-10 PROCEDURE — A9270 NON-COVERED ITEM OR SERVICE: HCPCS | Performed by: STUDENT IN AN ORGANIZED HEALTH CARE EDUCATION/TRAINING PROGRAM

## 2024-12-10 PROCEDURE — 80053 COMPREHEN METABOLIC PANEL: CPT

## 2024-12-10 PROCEDURE — 82746 ASSAY OF FOLIC ACID SERUM: CPT

## 2024-12-10 PROCEDURE — 85014 HEMATOCRIT: CPT

## 2024-12-10 PROCEDURE — 99285 EMERGENCY DEPT VISIT HI MDM: CPT

## 2024-12-10 PROCEDURE — 83690 ASSAY OF LIPASE: CPT

## 2024-12-10 PROCEDURE — 99223 1ST HOSP IP/OBS HIGH 75: CPT | Performed by: STUDENT IN AN ORGANIZED HEALTH CARE EDUCATION/TRAINING PROGRAM

## 2024-12-10 PROCEDURE — 770020 HCHG ROOM/CARE - TELE (206)

## 2024-12-10 PROCEDURE — 700111 HCHG RX REV CODE 636 W/ 250 OVERRIDE (IP): Performed by: STUDENT IN AN ORGANIZED HEALTH CARE EDUCATION/TRAINING PROGRAM

## 2024-12-10 PROCEDURE — 96374 THER/PROPH/DIAG INJ IV PUSH: CPT

## 2024-12-10 RX ORDER — PANTOPRAZOLE SODIUM 40 MG/10ML
40 INJECTION, POWDER, LYOPHILIZED, FOR SOLUTION INTRAVENOUS ONCE
Status: COMPLETED | OUTPATIENT
Start: 2024-12-10 | End: 2024-12-10

## 2024-12-10 RX ORDER — IBUPROFEN 600 MG/1
600 TABLET, FILM COATED ORAL 2 TIMES DAILY PRN
Status: ON HOLD | COMMUNITY
End: 2024-12-12

## 2024-12-10 RX ORDER — INSULIN LISPRO 100 [IU]/ML
1-6 INJECTION, SOLUTION INTRAVENOUS; SUBCUTANEOUS
Status: DISCONTINUED | OUTPATIENT
Start: 2024-12-11 | End: 2024-12-12 | Stop reason: HOSPADM

## 2024-12-10 RX ORDER — ONDANSETRON 2 MG/ML
4 INJECTION INTRAMUSCULAR; INTRAVENOUS EVERY 4 HOURS PRN
Status: DISCONTINUED | OUTPATIENT
Start: 2024-12-10 | End: 2024-12-12 | Stop reason: HOSPADM

## 2024-12-10 RX ORDER — METRONIDAZOLE 500 MG/1
250 TABLET ORAL EVERY 6 HOURS
Status: DISCONTINUED | OUTPATIENT
Start: 2024-12-10 | End: 2024-12-10

## 2024-12-10 RX ORDER — PANTOPRAZOLE SODIUM 40 MG/10ML
40 INJECTION, POWDER, LYOPHILIZED, FOR SOLUTION INTRAVENOUS 2 TIMES DAILY
Status: DISCONTINUED | OUTPATIENT
Start: 2024-12-11 | End: 2024-12-12 | Stop reason: HOSPADM

## 2024-12-10 RX ORDER — TETRACYCLINE HYDROCHLORIDE 500 MG/1
500 CAPSULE ORAL EVERY 6 HOURS
Status: DISCONTINUED | OUTPATIENT
Start: 2024-12-10 | End: 2024-12-12 | Stop reason: HOSPADM

## 2024-12-10 RX ORDER — ACETAMINOPHEN 325 MG/1
650 TABLET ORAL EVERY 6 HOURS PRN
Status: DISCONTINUED | OUTPATIENT
Start: 2024-12-10 | End: 2024-12-12 | Stop reason: HOSPADM

## 2024-12-10 RX ORDER — DEXTROSE MONOHYDRATE 25 G/50ML
25 INJECTION, SOLUTION INTRAVENOUS
Status: DISCONTINUED | OUTPATIENT
Start: 2024-12-10 | End: 2024-12-12 | Stop reason: HOSPADM

## 2024-12-10 RX ORDER — METRONIDAZOLE 500 MG/1
500 TABLET ORAL 3 TIMES DAILY
Status: DISCONTINUED | OUTPATIENT
Start: 2024-12-10 | End: 2024-12-12 | Stop reason: HOSPADM

## 2024-12-10 RX ORDER — PREGABALIN 25 MG/1
25 CAPSULE ORAL 2 TIMES DAILY
Status: DISCONTINUED | OUTPATIENT
Start: 2024-12-11 | End: 2024-12-12 | Stop reason: HOSPADM

## 2024-12-10 RX ADMIN — METRONIDAZOLE 500 MG: 500 TABLET ORAL at 22:28

## 2024-12-10 RX ADMIN — PANTOPRAZOLE SODIUM 40 MG: 40 INJECTION, POWDER, FOR SOLUTION INTRAVENOUS at 22:28

## 2024-12-10 ASSESSMENT — ENCOUNTER SYMPTOMS
SHORTNESS OF BREATH: 0
ABDOMINAL PAIN: 0
FEVER: 0
ORTHOPNEA: 0
DOUBLE VISION: 0
HEADACHES: 0
NECK PAIN: 0
BACK PAIN: 0
NAUSEA: 1
VOMITING: 0
HEMOPTYSIS: 0
EYE PAIN: 0
CHILLS: 0
PHOTOPHOBIA: 0
DIARRHEA: 0
PALPITATIONS: 0
EYE DISCHARGE: 0
SPUTUM PRODUCTION: 0
DIZZINESS: 0

## 2024-12-10 ASSESSMENT — FIBROSIS 4 INDEX
FIB4 SCORE: 0.37
FIB4 SCORE: 0.44

## 2024-12-10 ASSESSMENT — PAIN DESCRIPTION - PAIN TYPE: TYPE: ACUTE PAIN

## 2024-12-10 NOTE — TELEPHONE ENCOUNTER
I have spoken with the patient yesterday on 12/9/2020 1700 PM.  I have advised her on low hemoglobin levels 7.7, Positive H pylori and possibility of upper GI bleeding.  I have advised her evaluation emergency room as soon as possible.  Patient verbalized understanding and agrees with the plan.      Perla Dixon M.D.

## 2024-12-11 ENCOUNTER — ANESTHESIA (OUTPATIENT)
Dept: SURGERY | Facility: MEDICAL CENTER | Age: 49
DRG: 378 | End: 2024-12-11
Payer: COMMERCIAL

## 2024-12-11 ENCOUNTER — APPOINTMENT (OUTPATIENT)
Dept: RADIOLOGY | Facility: MEDICAL CENTER | Age: 49
End: 2024-12-11
Attending: INTERNAL MEDICINE
Payer: COMMERCIAL

## 2024-12-11 ENCOUNTER — ANESTHESIA EVENT (OUTPATIENT)
Dept: SURGERY | Facility: MEDICAL CENTER | Age: 49
DRG: 378 | End: 2024-12-11
Payer: COMMERCIAL

## 2024-12-11 PROBLEM — D50.9 IRON DEFICIENCY ANEMIA: Status: ACTIVE | Noted: 2024-12-11

## 2024-12-11 LAB
ALBUMIN SERPL BCP-MCNC: 4.4 G/DL (ref 3.2–4.9)
ALBUMIN/GLOB SERPL: 1.5 G/DL
ALP SERPL-CCNC: 59 U/L (ref 30–99)
ALT SERPL-CCNC: 25 U/L (ref 2–50)
ANION GAP SERPL CALC-SCNC: 14 MMOL/L (ref 7–16)
APPEARANCE UR: CLEAR
AST SERPL-CCNC: 25 U/L (ref 12–45)
BILIRUB SERPL-MCNC: <0.2 MG/DL (ref 0.1–1.5)
BILIRUB UR QL STRIP.AUTO: NEGATIVE
BUN SERPL-MCNC: 10 MG/DL (ref 8–22)
CALCIUM ALBUM COR SERPL-MCNC: 8.5 MG/DL (ref 8.5–10.5)
CALCIUM SERPL-MCNC: 8.8 MG/DL (ref 8.5–10.5)
CHLORIDE SERPL-SCNC: 102 MMOL/L (ref 96–112)
CO2 SERPL-SCNC: 22 MMOL/L (ref 20–33)
COLOR UR: YELLOW
CREAT SERPL-MCNC: 0.53 MG/DL (ref 0.5–1.4)
ERYTHROCYTE [DISTWIDTH] IN BLOOD BY AUTOMATED COUNT: 43.8 FL (ref 35.9–50)
FERRITIN SERPL-MCNC: 8.8 NG/ML (ref 10–291)
FOLATE SERPL-MCNC: 12.5 NG/ML
GFR SERPLBLD CREATININE-BSD FMLA CKD-EPI: 112 ML/MIN/1.73 M 2
GLOBULIN SER CALC-MCNC: 2.9 G/DL (ref 1.9–3.5)
GLUCOSE BLD STRIP.AUTO-MCNC: 109 MG/DL (ref 65–99)
GLUCOSE BLD STRIP.AUTO-MCNC: 115 MG/DL (ref 65–99)
GLUCOSE BLD STRIP.AUTO-MCNC: 85 MG/DL (ref 65–99)
GLUCOSE BLD STRIP.AUTO-MCNC: 98 MG/DL (ref 65–99)
GLUCOSE SERPL-MCNC: 94 MG/DL (ref 65–99)
GLUCOSE UR STRIP.AUTO-MCNC: NEGATIVE MG/DL
HCG UR QL: NEGATIVE
HCT VFR BLD AUTO: 25.9 % (ref 37–47)
HCT VFR BLD AUTO: 26.9 % (ref 37–47)
HCT VFR BLD AUTO: 27.5 % (ref 37–47)
HCT VFR BLD AUTO: 27.8 % (ref 37–47)
HEMOCCULT STL QL: NEGATIVE
HGB BLD-MCNC: 7.4 G/DL (ref 12–16)
HGB BLD-MCNC: 7.4 G/DL (ref 12–16)
HGB BLD-MCNC: 7.7 G/DL (ref 12–16)
HGB BLD-MCNC: 7.7 G/DL (ref 12–16)
IRON SATN MFR SERPL: 4 % (ref 15–55)
IRON SERPL-MCNC: 14 UG/DL (ref 40–170)
KETONES UR STRIP.AUTO-MCNC: NEGATIVE MG/DL
LEUKOCYTE ESTERASE UR QL STRIP.AUTO: NEGATIVE
MCH RBC QN AUTO: 18.6 PG (ref 27–33)
MCHC RBC AUTO-ENTMCNC: 28.6 G/DL (ref 32.2–35.5)
MCV RBC AUTO: 65.1 FL (ref 81.4–97.8)
MICRO URNS: NORMAL
NITRITE UR QL STRIP.AUTO: NEGATIVE
PATHOLOGY CONSULT NOTE: NORMAL
PH UR STRIP.AUTO: 6 [PH] (ref 5–8)
PLATELET # BLD AUTO: 479 K/UL (ref 164–446)
PMV BLD AUTO: 9.1 FL (ref 9–12.9)
POTASSIUM SERPL-SCNC: 3.6 MMOL/L (ref 3.6–5.5)
PROT SERPL-MCNC: 7.3 G/DL (ref 6–8.2)
PROT UR QL STRIP: NEGATIVE MG/DL
RBC # BLD AUTO: 3.98 M/UL (ref 4.2–5.4)
RBC UR QL AUTO: NEGATIVE
SODIUM SERPL-SCNC: 138 MMOL/L (ref 135–145)
SP GR UR STRIP.AUTO: 1.01
TIBC SERPL-MCNC: 379 UG/DL (ref 250–450)
UIBC SERPL-MCNC: 365 UG/DL (ref 110–370)
UROBILINOGEN UR STRIP.AUTO-MCNC: 0.2 EU/DL
VIT B12 SERPL-MCNC: 671 PG/ML (ref 211–911)
WBC # BLD AUTO: 6 K/UL (ref 4.8–10.8)

## 2024-12-11 PROCEDURE — 700111 HCHG RX REV CODE 636 W/ 250 OVERRIDE (IP): Performed by: STUDENT IN AN ORGANIZED HEALTH CARE EDUCATION/TRAINING PROGRAM

## 2024-12-11 PROCEDURE — 99253 IP/OBS CNSLTJ NEW/EST LOW 45: CPT | Mod: 25 | Performed by: INTERNAL MEDICINE

## 2024-12-11 PROCEDURE — 88312 SPECIAL STAINS GROUP 1: CPT

## 2024-12-11 PROCEDURE — 80053 COMPREHEN METABOLIC PANEL: CPT

## 2024-12-11 PROCEDURE — 85018 HEMOGLOBIN: CPT

## 2024-12-11 PROCEDURE — 700111 HCHG RX REV CODE 636 W/ 250 OVERRIDE (IP): Mod: JZ | Performed by: STUDENT IN AN ORGANIZED HEALTH CARE EDUCATION/TRAINING PROGRAM

## 2024-12-11 PROCEDURE — 83550 IRON BINDING TEST: CPT

## 2024-12-11 PROCEDURE — 82607 VITAMIN B-12: CPT

## 2024-12-11 PROCEDURE — A9270 NON-COVERED ITEM OR SERVICE: HCPCS

## 2024-12-11 PROCEDURE — A9270 NON-COVERED ITEM OR SERVICE: HCPCS | Performed by: STUDENT IN AN ORGANIZED HEALTH CARE EDUCATION/TRAINING PROGRAM

## 2024-12-11 PROCEDURE — 700105 HCHG RX REV CODE 258: Performed by: STUDENT IN AN ORGANIZED HEALTH CARE EDUCATION/TRAINING PROGRAM

## 2024-12-11 PROCEDURE — 82272 OCCULT BLD FECES 1-3 TESTS: CPT

## 2024-12-11 PROCEDURE — 81003 URINALYSIS AUTO W/O SCOPE: CPT

## 2024-12-11 PROCEDURE — 43239 EGD BIOPSY SINGLE/MULTIPLE: CPT | Performed by: INTERNAL MEDICINE

## 2024-12-11 PROCEDURE — 160048 HCHG OR STATISTICAL LEVEL 1-5: Performed by: INTERNAL MEDICINE

## 2024-12-11 PROCEDURE — A9270 NON-COVERED ITEM OR SERVICE: HCPCS | Performed by: NURSE PRACTITIONER

## 2024-12-11 PROCEDURE — 160035 HCHG PACU - 1ST 60 MINS PHASE I: Performed by: INTERNAL MEDICINE

## 2024-12-11 PROCEDURE — 0DJ08ZZ INSPECTION OF UPPER INTESTINAL TRACT, VIA NATURAL OR ARTIFICIAL OPENING ENDOSCOPIC: ICD-10-PCS | Performed by: INTERNAL MEDICINE

## 2024-12-11 PROCEDURE — 160002 HCHG RECOVERY MINUTES (STAT): Performed by: INTERNAL MEDICINE

## 2024-12-11 PROCEDURE — 700102 HCHG RX REV CODE 250 W/ 637 OVERRIDE(OP)

## 2024-12-11 PROCEDURE — 700101 HCHG RX REV CODE 250: Performed by: STUDENT IN AN ORGANIZED HEALTH CARE EDUCATION/TRAINING PROGRAM

## 2024-12-11 PROCEDURE — 700102 HCHG RX REV CODE 250 W/ 637 OVERRIDE(OP): Performed by: NURSE PRACTITIONER

## 2024-12-11 PROCEDURE — 160203 HCHG ENDO MINUTES - 1ST 30 MINS LEVEL 4: Performed by: INTERNAL MEDICINE

## 2024-12-11 PROCEDURE — 700102 HCHG RX REV CODE 250 W/ 637 OVERRIDE(OP): Performed by: STUDENT IN AN ORGANIZED HEALTH CARE EDUCATION/TRAINING PROGRAM

## 2024-12-11 PROCEDURE — 770020 HCHG ROOM/CARE - TELE (206)

## 2024-12-11 PROCEDURE — 160009 HCHG ANES TIME/MIN: Performed by: INTERNAL MEDICINE

## 2024-12-11 PROCEDURE — 36415 COLL VENOUS BLD VENIPUNCTURE: CPT

## 2024-12-11 PROCEDURE — 82728 ASSAY OF FERRITIN: CPT

## 2024-12-11 PROCEDURE — 85027 COMPLETE CBC AUTOMATED: CPT

## 2024-12-11 PROCEDURE — 81025 URINE PREGNANCY TEST: CPT

## 2024-12-11 PROCEDURE — 82962 GLUCOSE BLOOD TEST: CPT

## 2024-12-11 PROCEDURE — 99233 SBSQ HOSP IP/OBS HIGH 50: CPT | Performed by: STUDENT IN AN ORGANIZED HEALTH CARE EDUCATION/TRAINING PROGRAM

## 2024-12-11 PROCEDURE — 88305 TISSUE EXAM BY PATHOLOGIST: CPT

## 2024-12-11 PROCEDURE — 85014 HEMATOCRIT: CPT

## 2024-12-11 PROCEDURE — 83540 ASSAY OF IRON: CPT

## 2024-12-11 RX ORDER — IPRATROPIUM BROMIDE AND ALBUTEROL SULFATE 2.5; .5 MG/3ML; MG/3ML
3 SOLUTION RESPIRATORY (INHALATION)
Status: DISCONTINUED | OUTPATIENT
Start: 2024-12-11 | End: 2024-12-11 | Stop reason: HOSPADM

## 2024-12-11 RX ORDER — PRAVASTATIN SODIUM 20 MG
40 TABLET ORAL EVERY EVENING
Status: DISCONTINUED | OUTPATIENT
Start: 2024-12-11 | End: 2024-12-12 | Stop reason: HOSPADM

## 2024-12-11 RX ORDER — OXYCODONE HCL 5 MG/5 ML
5 SOLUTION, ORAL ORAL
Status: DISCONTINUED | OUTPATIENT
Start: 2024-12-11 | End: 2024-12-11 | Stop reason: HOSPADM

## 2024-12-11 RX ORDER — SODIUM CHLORIDE, SODIUM LACTATE, POTASSIUM CHLORIDE, CALCIUM CHLORIDE 600; 310; 30; 20 MG/100ML; MG/100ML; MG/100ML; MG/100ML
INJECTION, SOLUTION INTRAVENOUS CONTINUOUS
Status: DISCONTINUED | OUTPATIENT
Start: 2024-12-11 | End: 2024-12-11 | Stop reason: HOSPADM

## 2024-12-11 RX ORDER — SODIUM CHLORIDE, SODIUM LACTATE, POTASSIUM CHLORIDE, CALCIUM CHLORIDE 600; 310; 30; 20 MG/100ML; MG/100ML; MG/100ML; MG/100ML
INJECTION, SOLUTION INTRAVENOUS
Status: DISCONTINUED | OUTPATIENT
Start: 2024-12-11 | End: 2024-12-11 | Stop reason: SURG

## 2024-12-11 RX ORDER — HALOPERIDOL 5 MG/ML
1 INJECTION INTRAMUSCULAR
Status: DISCONTINUED | OUTPATIENT
Start: 2024-12-11 | End: 2024-12-11 | Stop reason: HOSPADM

## 2024-12-11 RX ORDER — HYDRALAZINE HYDROCHLORIDE 20 MG/ML
5 INJECTION INTRAMUSCULAR; INTRAVENOUS
Status: DISCONTINUED | OUTPATIENT
Start: 2024-12-11 | End: 2024-12-11 | Stop reason: HOSPADM

## 2024-12-11 RX ORDER — HYDROMORPHONE HYDROCHLORIDE 1 MG/ML
0.1 INJECTION, SOLUTION INTRAMUSCULAR; INTRAVENOUS; SUBCUTANEOUS
Status: DISCONTINUED | OUTPATIENT
Start: 2024-12-11 | End: 2024-12-11 | Stop reason: HOSPADM

## 2024-12-11 RX ORDER — LABETALOL HYDROCHLORIDE 5 MG/ML
5 INJECTION, SOLUTION INTRAVENOUS
Status: DISCONTINUED | OUTPATIENT
Start: 2024-12-11 | End: 2024-12-11 | Stop reason: HOSPADM

## 2024-12-11 RX ORDER — MEPERIDINE HYDROCHLORIDE 25 MG/ML
12.5 INJECTION INTRAMUSCULAR; INTRAVENOUS; SUBCUTANEOUS
Status: DISCONTINUED | OUTPATIENT
Start: 2024-12-11 | End: 2024-12-11 | Stop reason: HOSPADM

## 2024-12-11 RX ORDER — HYDROMORPHONE HYDROCHLORIDE 1 MG/ML
0.4 INJECTION, SOLUTION INTRAMUSCULAR; INTRAVENOUS; SUBCUTANEOUS
Status: DISCONTINUED | OUTPATIENT
Start: 2024-12-11 | End: 2024-12-11 | Stop reason: HOSPADM

## 2024-12-11 RX ORDER — ONDANSETRON 2 MG/ML
4 INJECTION INTRAMUSCULAR; INTRAVENOUS
Status: DISCONTINUED | OUTPATIENT
Start: 2024-12-11 | End: 2024-12-11 | Stop reason: HOSPADM

## 2024-12-11 RX ORDER — DIPHENHYDRAMINE HYDROCHLORIDE 50 MG/ML
12.5 INJECTION INTRAMUSCULAR; INTRAVENOUS
Status: DISCONTINUED | OUTPATIENT
Start: 2024-12-11 | End: 2024-12-11 | Stop reason: HOSPADM

## 2024-12-11 RX ORDER — HYDROMORPHONE HYDROCHLORIDE 1 MG/ML
0.2 INJECTION, SOLUTION INTRAMUSCULAR; INTRAVENOUS; SUBCUTANEOUS
Status: DISCONTINUED | OUTPATIENT
Start: 2024-12-11 | End: 2024-12-11 | Stop reason: HOSPADM

## 2024-12-11 RX ORDER — EPHEDRINE SULFATE 50 MG/ML
5 INJECTION, SOLUTION INTRAVENOUS
Status: DISCONTINUED | OUTPATIENT
Start: 2024-12-11 | End: 2024-12-11 | Stop reason: HOSPADM

## 2024-12-11 RX ORDER — LIDOCAINE HYDROCHLORIDE 20 MG/ML
INJECTION, SOLUTION EPIDURAL; INFILTRATION; INTRACAUDAL; PERINEURAL PRN
Status: DISCONTINUED | OUTPATIENT
Start: 2024-12-11 | End: 2024-12-11 | Stop reason: SURG

## 2024-12-11 RX ORDER — OXYCODONE HCL 5 MG/5 ML
10 SOLUTION, ORAL ORAL
Status: DISCONTINUED | OUTPATIENT
Start: 2024-12-11 | End: 2024-12-11 | Stop reason: HOSPADM

## 2024-12-11 RX ADMIN — SODIUM CHLORIDE, POTASSIUM CHLORIDE, SODIUM LACTATE AND CALCIUM CHLORIDE: 600; 310; 30; 20 INJECTION, SOLUTION INTRAVENOUS at 13:00

## 2024-12-11 RX ADMIN — PROPOFOL 20 MG: 10 INJECTION, EMULSION INTRAVENOUS at 13:09

## 2024-12-11 RX ADMIN — PANTOPRAZOLE SODIUM 40 MG: 40 INJECTION, POWDER, FOR SOLUTION INTRAVENOUS at 05:26

## 2024-12-11 RX ADMIN — BISMUTH SUBSALICYLATE 524 MG: 525 LIQUID ORAL at 08:09

## 2024-12-11 RX ADMIN — TETRACYCLINE HYDROCHLORIDE 500 MG: 500 CAPSULE ORAL at 20:25

## 2024-12-11 RX ADMIN — PROPOFOL 20 MG: 10 INJECTION, EMULSION INTRAVENOUS at 13:07

## 2024-12-11 RX ADMIN — BISMUTH SUBSALICYLATE 524 MG: 525 LIQUID ORAL at 20:26

## 2024-12-11 RX ADMIN — PREGABALIN 25 MG: 25 CAPSULE ORAL at 01:27

## 2024-12-11 RX ADMIN — BISMUTH SUBSALICYLATE 524 MG: 525 LIQUID ORAL at 17:41

## 2024-12-11 RX ADMIN — SODIUM CHLORIDE 250 MG: 9 INJECTION, SOLUTION INTRAVENOUS at 17:41

## 2024-12-11 RX ADMIN — ACETAMINOPHEN 650 MG: 325 TABLET ORAL at 08:16

## 2024-12-11 RX ADMIN — TETRACYCLINE HYDROCHLORIDE 500 MG: 500 CAPSULE ORAL at 05:26

## 2024-12-11 RX ADMIN — PROPOFOL 40 MG: 10 INJECTION, EMULSION INTRAVENOUS at 13:05

## 2024-12-11 RX ADMIN — PANTOPRAZOLE SODIUM 40 MG: 40 INJECTION, POWDER, FOR SOLUTION INTRAVENOUS at 17:38

## 2024-12-11 RX ADMIN — PROPOFOL 40 MG: 10 INJECTION, EMULSION INTRAVENOUS at 13:04

## 2024-12-11 RX ADMIN — BISMUTH SUBSALICYLATE 524 MG: 525 LIQUID ORAL at 01:34

## 2024-12-11 RX ADMIN — PROPOFOL 40 MG: 10 INJECTION, EMULSION INTRAVENOUS at 13:03

## 2024-12-11 RX ADMIN — METRONIDAZOLE 500 MG: 500 TABLET ORAL at 14:35

## 2024-12-11 RX ADMIN — TETRACYCLINE HYDROCHLORIDE 500 MG: 500 CAPSULE ORAL at 01:33

## 2024-12-11 RX ADMIN — TETRACYCLINE HYDROCHLORIDE 500 MG: 500 CAPSULE ORAL at 14:35

## 2024-12-11 RX ADMIN — PREGABALIN 25 MG: 25 CAPSULE ORAL at 17:38

## 2024-12-11 RX ADMIN — METRONIDAZOLE 500 MG: 500 TABLET ORAL at 08:09

## 2024-12-11 RX ADMIN — PRAVASTATIN SODIUM 40 MG: 20 TABLET ORAL at 21:44

## 2024-12-11 RX ADMIN — METRONIDAZOLE 500 MG: 500 TABLET ORAL at 20:25

## 2024-12-11 RX ADMIN — LIDOCAINE HYDROCHLORIDE 80 MG: 20 INJECTION, SOLUTION EPIDURAL; INFILTRATION; INTRACAUDAL; PERINEURAL at 13:03

## 2024-12-11 ASSESSMENT — COGNITIVE AND FUNCTIONAL STATUS - GENERAL
MOBILITY SCORE: 24
SUGGESTED CMS G CODE MODIFIER DAILY ACTIVITY: CH
DAILY ACTIVITIY SCORE: 24
SUGGESTED CMS G CODE MODIFIER MOBILITY: CH
SUGGESTED CMS G CODE MODIFIER MOBILITY: CH
DAILY ACTIVITIY SCORE: 24
SUGGESTED CMS G CODE MODIFIER DAILY ACTIVITY: CH
MOBILITY SCORE: 24

## 2024-12-11 ASSESSMENT — PATIENT HEALTH QUESTIONNAIRE - PHQ9
2. FEELING DOWN, DEPRESSED, IRRITABLE, OR HOPELESS: NOT AT ALL
1. LITTLE INTEREST OR PLEASURE IN DOING THINGS: NOT AT ALL
SUM OF ALL RESPONSES TO PHQ9 QUESTIONS 1 AND 2: 0

## 2024-12-11 ASSESSMENT — PAIN SCALES - GENERAL: PAIN_LEVEL: 0

## 2024-12-11 ASSESSMENT — PAIN DESCRIPTION - PAIN TYPE
TYPE: SURGICAL PAIN
TYPE: CHRONIC PAIN
TYPE: ACUTE PAIN
TYPE: ACUTE PAIN

## 2024-12-11 ASSESSMENT — SOCIAL DETERMINANTS OF HEALTH (SDOH)
WITHIN THE LAST YEAR, HAVE YOU BEEN HUMILIATED OR EMOTIONALLY ABUSED IN OTHER WAYS BY YOUR PARTNER OR EX-PARTNER?: NO
WITHIN THE LAST YEAR, HAVE YOU BEEN KICKED, HIT, SLAPPED, OR OTHERWISE PHYSICALLY HURT BY YOUR PARTNER OR EX-PARTNER?: NO
WITHIN THE LAST YEAR, HAVE YOU BEEN AFRAID OF YOUR PARTNER OR EX-PARTNER?: NO
WITHIN THE LAST YEAR, HAVE TO BEEN RAPED OR FORCED TO HAVE ANY KIND OF SEXUAL ACTIVITY BY YOUR PARTNER OR EX-PARTNER?: NO

## 2024-12-11 ASSESSMENT — LIFESTYLE VARIABLES
HOW MANY TIMES IN THE PAST YEAR HAVE YOU HAD 5 OR MORE DRINKS IN A DAY: 0
EVER HAD A DRINK FIRST THING IN THE MORNING TO STEADY YOUR NERVES TO GET RID OF A HANGOVER: NO
EVER FELT BAD OR GUILTY ABOUT YOUR DRINKING: NO
ON A TYPICAL DAY WHEN YOU DRINK ALCOHOL HOW MANY DRINKS DO YOU HAVE: 1
ALCOHOL_USE: YES
HAVE PEOPLE ANNOYED YOU BY CRITICIZING YOUR DRINKING: NO
DOES PATIENT WANT TO STOP DRINKING: NO
HAVE YOU EVER FELT YOU SHOULD CUT DOWN ON YOUR DRINKING: NO
TOTAL SCORE: 0
TOTAL SCORE: 0
AVERAGE NUMBER OF DAYS PER WEEK YOU HAVE A DRINK CONTAINING ALCOHOL: 0
CONSUMPTION TOTAL: NEGATIVE
TOTAL SCORE: 0

## 2024-12-11 ASSESSMENT — FIBROSIS 4 INDEX: FIB4 SCORE: 0.52

## 2024-12-11 NOTE — CARE PLAN
The patient is Stable - Low risk of patient condition declining or worsening    Shift Goals  Clinical Goals: monitor HH values, monitor labs  Patient Goals: monitor labs, plan of care, single room  Family Goals: plan of care    Progress made toward(s) clinical / shift goals:    Problem: Knowledge Deficit - Standard  Goal: Patient and family/care givers will demonstrate understanding of plan of care, disease process/condition, diagnostic tests and medications  Outcome: Progressing     Problem: Hemodynamics  Goal: Patient's hemodynamics, fluid balance and neurologic status will be stable or improve  Outcome: Progressing  Note: HH monitoring in place, VSS.     Problem: Pain - Standard  Goal: Alleviation of pain or a reduction in pain to the patient’s comfort goal  Outcome: Progressing

## 2024-12-11 NOTE — PROCEDURES
OPERATIVE REPORT    PATIENT:   Sybil Manuel   1975       PREOPERATIVE DIAGNOSES/INDICATIONS: Anemia, r/o GI bleeding.     POSTOPERATIVE DIAGNOSIS:   Gastritis, but no overt source of bleeding.     PROCEDURE:  ESOPHAGOGASTRODUODENOSCOPY    PHYSICIAN:  Robert Bloom MD. MPH.    ANESTHESIA:  Per anesthesiologist or ICU/ED team.     LOCATION: [facility]    CONSENT:  OBTAINED. The risks, benefits and alternatives of the procedure were discussed in details. The risks include and are not limited to bleeding, infection, perforation, missed lesions, and sedations risks (cardiopulmonary compromise and allergic reaction to medications).    DESCRIPTION: Scope team at bedside  Patient was placed on his/her left lateral decubitus position. A bite block was placed in patient's mouth. Patient was sedated by anesthesia.  Vital signs were monitored throughout the procedure.  Oxygenation support was provided throughout the procedure. Upper endoscope was inserted into patient's mouth and advanced to the second portion of the duodenum under direct visualization.      Once the site was reached and examined, the upper endoscope was withdrawn.  Retroflexion was made within the stomach.  The stomach was decompressed, scope was withdrawn and the procedure was terminated.    The patient tolerated the procedure well.  There were no immediate complications.    Estimate procedure related bleeding: less than 1cc.     OPERATIVE FINDINGS:    1. Esophagus: Grossly normal.   2. Stomach: Mild gastritis at body and antrum, s/p biopsy.   3. Duodenum: Grossly normal.     RECOMMENDATIONS:  Okay to resume anti-HP medication regimen.   She had a neg colonoscopy at [facility] 4-5 years ago, no plan to repeat during this admission.   She needs further anemia study for non-gi bleeding issue.   GI signs off.     This note has been transcribed with digital voice recognition software and although it has been reviewed may contain grammatical or word errors

## 2024-12-11 NOTE — H&P
Hospital Medicine History & Physical Note    Date of Service  12/10/2024    Primary Care Physician  Perla Dixon M.D.      Code Status  Full Code    Chief Complaint  Chief Complaint   Patient presents with    Sent by MD     Sent by MD for HgB of 7.7, drawn on 12/6.        History of Presenting Illness  Sybil Manuel is a 49 y.o. female who presented 12/10/2024 at the recommendation of her primary care physician.  Patient was recently seen by her primary care physician, where she was tested for H. pylori.  She tested positive, was supposed to start on oral outpatient regimen, but she has yet to do so.  Her primary care physician told her to come to the emergency department due to concerning hemoglobin levels at 7.6.  While in our emergency department, patient does complain of some fatigue.  Her vital signs are stable.  She states that she has noticed some dark tarry stools over the last several days.  Denies any hematemesis or bright red bloody bowel movements.  Patient will be admitted for management of acute blood loss anemia secondary to GI bleed.  She will need gastroenterology consult in the morning.    I discussed the plan of care with patient and family.    Review of Systems  Review of Systems   Constitutional:  Negative for chills and fever.   Eyes:  Negative for double vision, photophobia, pain and discharge.   Respiratory:  Negative for hemoptysis, sputum production and shortness of breath.    Cardiovascular:  Negative for chest pain, palpitations and orthopnea.   Gastrointestinal:  Positive for melena and nausea. Negative for abdominal pain, diarrhea and vomiting.   Musculoskeletal:  Negative for back pain and neck pain.   Neurological:  Negative for dizziness and headaches.   All other systems reviewed and are negative.      Past Medical History   has a past medical history of Allergy, Anxiety, ASTHMA (9/3/2010), COVID-19 (6/27/2022), Depression (2008), Diabetes mellitus type II (9/3/2010),  Elevated liver enzymes (2009), GERD (gastroesophageal reflux disease), Hyperlipidemia, Multiple thyroid nodules (3/24/2023), Thyroid nodule (3/8/2023), Urinary tract infection, site not specified, and Vitamin D deficiency (2009).    Surgical History   has a past surgical history that includes primary c section; tubal coagulation laparoscopic bilateral; and orif, wrist (Right, 1/27/2023).     Family History  family history includes Arrythmia in her mother; Diabetes in her brother and maternal grandmother; Heart Disease in her mother; Hyperlipidemia in her brother and mother; Hypertension in her mother.   Family history reviewed with patient. There is no family history that is pertinent to the chief complaint.     Social History   reports that she has never smoked. She has never used smokeless tobacco. She reports that she does not currently use alcohol. She reports that she does not use drugs.    Allergies  No Known Allergies    Medications  Prior to Admission Medications   Prescriptions Last Dose Informant Patient Reported? Taking?   Amoxicill&Clarithro&Vonoprazan 500 & 500 & 20 MG Therapy Pack   No No   Sig: Take 1 Each Tablet by mouth 2 times a day for 14 days.   LORATADINE   Yes No   Sig: Take 10 mg by mouth 1 time daily as needed.   Multiple Vitamins-Minerals (MULTIVITAL PO)   Yes No   Sig: Take  by mouth.   Patient not taking: Reported on 12/5/2024   Semaglutide,0.25 or 0.5MG/DOS, (OZEMPIC, 0.25 OR 0.5 MG/DOSE,) 2 MG/3ML Solution Pen-injector   No No   Sig: Inject 0.5 mg under the skin every 7 days.   albuterol 108 (90 Base) MCG/ACT Aero Soln inhalation aerosol   No No   Sig: INHALE 2 PUFFS BY MOUTH EVERY 4 HOURS AS NEEDED FOR SHORTNESS OF BREATH   fluticasone (FLONASE) 50 MCG/ACT nasal spray   No No   Sig: Administer 1 Spray into affected nostril(S) every day.   metFORMIN ER (GLUCOPHAGE XR) 500 MG TABLET SR 24 HR   No No   Sig: Take 2 Tablets by mouth 2 times a day.   omeprazole (PRILOSEC) 20 MG  delayed-release capsule   No No   Sig: Take 1 Capsule by mouth every day.   ondansetron (ZOFRAN ODT) 4 MG TABLET DISPERSIBLE   No No   Sig: Take 1 Tablet by mouth every 6 hours as needed for Nausea/Vomiting.   oxyCODONE immediate-release (ROXICODONE) 5 MG Tab   Yes No   Sig: Take 1 Tablet by mouth 1 time a day as needed.   pravastatin (PRAVACHOL) 40 MG tablet   No No   Sig: Take 1 Tablet by mouth every evening.   pregabalin (LYRICA) 150 MG Cap   Yes No   Sig: TAKE 1 CAPSULE BY MOUTH ONCE DAILY AT BEDTIME FOR 30 DAYS   pregabalin (LYRICA) 25 MG Cap   Yes No   Sig: Take 1 Capsule by mouth 2 times a day as needed.   triamcinolone acetonide (KENALOG) 0.1 % Cream   No No   Sig: Apply 1 Application topically 2 times a day.   vitamin D2, Ergocalciferol, (DRISDOL) 1.25 MG (02984 UT) Cap capsule   No No   Sig: Take 1 Capsule by mouth every 7 days.   Patient not taking: Reported on 12/5/2024      Facility-Administered Medications: None       Physical Exam  Temp:  [36.1 °C (96.9 °F)] 36.1 °C (96.9 °F)  Pulse:  [86] 86  Resp:  [14] 14  BP: (172)/(88) 172/88  SpO2:  [99 %] 99 %  Blood Pressure: (!) 172/88   Temperature: 36.1 °C (96.9 °F)   Pulse: 86   Respiration: 14   Pulse Oximetry: 99 %       Physical Exam  Constitutional:       General: She is not in acute distress.     Appearance: Normal appearance. She is normal weight. She is not ill-appearing, toxic-appearing or diaphoretic.   HENT:      Head: Normocephalic and atraumatic.      Nose: Nose normal.      Mouth/Throat:      Mouth: Mucous membranes are moist.   Eyes:      Extraocular Movements: Extraocular movements intact.      Pupils: Pupils are equal, round, and reactive to light.   Cardiovascular:      Rate and Rhythm: Normal rate and regular rhythm.      Pulses: Normal pulses.      Heart sounds: Normal heart sounds. No murmur heard.     No friction rub. No gallop.   Pulmonary:      Effort: Pulmonary effort is normal. No respiratory distress.      Breath sounds: No  "stridor. No wheezing, rhonchi or rales.   Chest:      Chest wall: No tenderness.   Abdominal:      General: Abdomen is flat. There is no distension.      Palpations: Abdomen is soft. There is no mass.      Tenderness: There is no abdominal tenderness. There is no guarding or rebound.      Hernia: No hernia is present.   Musculoskeletal:         General: No swelling, tenderness, deformity or signs of injury.      Right lower leg: No edema.      Left lower leg: No edema.      Comments:      Skin:     General: Skin is warm and dry.      Capillary Refill: Capillary refill takes less than 2 seconds.      Coloration: Skin is not jaundiced or pale.      Findings: No bruising, erythema, lesion or rash.   Neurological:      General: No focal deficit present.      Mental Status: She is alert and oriented to person, place, and time. Mental status is at baseline.      Cranial Nerves: No cranial nerve deficit.      Sensory: No sensory deficit.      Motor: No weakness.      Coordination: Coordination normal.   Psychiatric:         Mood and Affect: Mood normal.         Behavior: Behavior normal.         Laboratory:  Recent Labs     12/10/24  2025   WBC 5.8   RBC 4.23   HEMOGLOBIN 7.8*   HEMATOCRIT 27.5*   MCV 65.0*   MCH 18.4*   MCHC 28.4*   RDW 43.4   PLATELETCT 542*   MPV 9.2     Recent Labs     12/10/24  2025   SODIUM 137   POTASSIUM 3.6   CHLORIDE 100   CO2 23   GLUCOSE 204*   BUN 11   CREATININE 0.54   CALCIUM 9.4     Recent Labs     12/10/24  2025   ALTSGPT 30   ASTSGOT 26   ALKPHOSPHAT 68   TBILIRUBIN <0.2   LIPASE 44   GLUCOSE 204*         No results for input(s): \"NTPROBNP\" in the last 72 hours.      No results for input(s): \"TROPONINT\" in the last 72 hours.    Imaging:  No orders to display       X-Ray:  I have personally reviewed the images and compared with prior images.  EKG:  I have personally reviewed the images and compared with prior images.    Assessment/Plan:  Justification for Admission Status  I anticipate " this patient will require at least two midnights for appropriate medical management, necessitating inpatient admission because GI bleed    Patient will need a Telemetry bed on MEDICAL service .  The need is secondary to GI bleed.    * GI bleed- (present on admission)  Assessment & Plan  Patient recently diagnosed with H. pylori by outside primary care physician.  She has not started her oral medications  She has downtrending hemoglobin levels.  She does endorse dark melanotic stools  Follow-up occult blood  Gastroenterology consult in morning  Trend hemoglobin every 4 hours  Transfuse for hemoglobin less than 7  Pantoprazole 40 mg IV twice daily  Continue with appropriate H. pylori treatment    Acute blood loss anemia  Assessment & Plan  Secondary to GI bleed  Trend hemoglobin every 4 hours  Transfuse for hemoglobin less than 7    H pylori ulcer  Assessment & Plan  Continue with pantoprazole, Flagyl, Pepto-Bismol, tetracycline    Nausea- (present on admission)  Assessment & Plan  Continue with IV and p.o. antiemetics        VTE prophylaxis: SCDs/TEDs

## 2024-12-11 NOTE — ED PROVIDER NOTES
ED Provider Note    CHIEF COMPLAINT  Chief Complaint   Patient presents with    Sent by MD     Sent by MD for HgB of 7.7, drawn on 12/6.        EXTERNAL RECORDS REVIEWED  Outpatient Notes office visit on 12/5/2024 for nausea and vomiting    HPI/ROS  LIMITATION TO HISTORY   Select: : None  OUTSIDE HISTORIAN(S):    Sybil Manuel is a 49 y.o. female who presents for acute anemia and H. pylori positive testing with PCP and dark stools.  Patient denies hematemesis.  Patient denies history of ulcers.  Patient denies starting any medicine for her H. pylori.  Patient endorses some fatigue and lightheadedness but no chest pain or shortness of breath.    PAST MEDICAL HISTORY   has a past medical history of Allergy, Anxiety, ASTHMA (9/3/2010), COVID-19 (6/27/2022), Depression (2008), Diabetes mellitus type II (9/3/2010), Elevated liver enzymes (2009), GERD (gastroesophageal reflux disease), Hyperlipidemia, Multiple thyroid nodules (3/24/2023), Thyroid nodule (3/8/2023), Urinary tract infection, site not specified, and Vitamin D deficiency (2009).    SURGICAL HISTORY   has a past surgical history that includes primary c section; tubal coagulation laparoscopic bilateral; and orif, wrist (Right, 1/27/2023).    FAMILY HISTORY  Family History   Problem Relation Age of Onset    Heart Disease Mother         65 yo stents, pacemaker x2    Hypertension Mother     Hyperlipidemia Mother     Arrythmia Mother     Diabetes Brother     Hyperlipidemia Brother     Diabetes Maternal Grandmother     Cancer Neg Hx        SOCIAL HISTORY  Social History     Tobacco Use    Smoking status: Never    Smokeless tobacco: Never   Vaping Use    Vaping status: Never Used   Substance and Sexual Activity    Alcohol use: Not Currently     Comment: 4 per year    Drug use: Never    Sexual activity: Yes     Partners: Male     Birth control/protection: Surgical       CURRENT MEDICATIONS  Home Medications       Reviewed by Catracho Cuellar (Pharmacy Tech) on  "12/10/24 at 2234  Med List Status: Complete     Medication Last Dose Status   albuterol 108 (90 Base) MCG/ACT Aero Soln inhalation aerosol Not Taking Active   Amoxicill&Clarithro&Vonoprazan 500 & 500 & 20 MG Therapy Pack New Rx Active   ibuprofen (MOTRIN) 600 MG Tab 12/9/2024 Active   metFORMIN ER (GLUCOPHAGE XR) 500 MG TABLET SR 24 HR 12/10/2024 Active   omeprazole (PRILOSEC) 20 MG delayed-release capsule New Rx Active   ondansetron (ZOFRAN ODT) 4 MG TABLET DISPERSIBLE 12/7/2024 Active   oxyCODONE immediate-release (ROXICODONE) 5 MG Tab  Active   pravastatin (PRAVACHOL) 40 MG tablet 12/9/2024 Active   pregabalin (LYRICA) 150 MG Cap 12/3/2024 Active   pregabalin (LYRICA) 25 MG Cap 12/7/2024 Active   Semaglutide,0.25 or 0.5MG/DOS, (OZEMPIC, 0.25 OR 0.5 MG/DOSE,) 2 MG/3ML Solution Pen-injector 12/4/2024 Active   triamcinolone acetonide (KENALOG) 0.1 % Cream New Rx Active   vitamin D2, Ergocalciferol, (DRISDOL) 1.25 MG (96978 UT) Cap capsule  Active                    ALLERGIES  No Known Allergies    PHYSICAL EXAM  VITAL SIGNS: BP (!) 172/88   Pulse 86   Temp 36.1 °C (96.9 °F) (Temporal)   Resp 14   Ht 1.575 m (5' 2\")   Wt 62.4 kg (137 lb 9.1 oz)   SpO2 99%   BMI 25.16 kg/m²    Vitals and nursing note reviewed.   Constitutional:       Comments: Patient is lying in bed supine, pleasant, conversant, speaking in complete sentences   HENT:      Head: Normocephalic and atraumatic.   Eyes:      Extraocular Movements: Extraocular movements intact.      Conjunctiva/sclera: Conjunctivae normal.      Pupils: Pupils are equal, round, and reactive to light.   Cardiovascular:      Pulses: Normal pulses.      Comments: HR 86  Pulmonary:      Effort: Pulmonary effort is normal. No respiratory distress.   Abdominal:      Comments: Abdomen is soft, non-tender, non-distended, non-rigid, no rebound, guarding, masses, no McBurney's point tenderness, no peritoneal signs, negative Rovsing sign, negative Kennedy sign.  No CVA " tenderness to palpation. Benign abdomen.   Musculoskeletal:         General: No swelling. Normal range of motion.      Cervical back: Normal range of motion. No rigidity.   Skin:     General: Skin is warm and dry.      Capillary Refill: Capillary refill takes less than 2 seconds.   Neurological:      Mental Status: Alert.       COURSE & MEDICAL DECISION MAKING    ASSESSMENT, COURSE AND PLAN  Care Narrative: Significant drop in H&H concerning for GI bleed.  This especially in the context of positive H. pylori and dark stools.  Other than anemia, CMP demonstrates no evidence of acute kidney injury, acute electrolyte abnormality, acute liver failure, CBC demonstrates no evidence of leukocytosis.  Lipase negative, pancreatitis inconsistent with patient presentation at this time. No evidence of appendicitis, diverticulitis, small bowel obstruction, perforation, cholecystitis based on physical exam findings.  Protonix given for symptom control.  Patient admitted to the hospital medicine service for endoscopy.    Electronically signed by: John Boyle M.D., 12/10/2024 10:49 PM      This dictation has been created using voice recognition software. I am continuously working with the software to minimize the number of voice recognition errors and I have made every attempt to manually correct the errors within my dictation. However errors  related to this voice recognition software may still exist and should be interpreted within the appropriate context.     Electronically signed by: John Boyle M.D., 12/10/2024 10:49 PM        FINAL DIAGNOSIS  1. Upper GI bleed    2. Acute anemia         Electronically signed by: John Boyle M.D., 12/10/2024 10:47 PM

## 2024-12-11 NOTE — ANESTHESIA TIME REPORT
Anesthesia Start and Stop Event Times       Date Time Event    12/11/2024 1257 Ready for Procedure     1300 Anesthesia Start     1315 Anesthesia Stop          Responsible Staff  12/11/24      Name Role Begin End    Efren Perez MD, PhD Anesth 1300 1315          Overtime Reason:  no overtime (within assigned shift)    Comments:

## 2024-12-11 NOTE — ANESTHESIA PREPROCEDURE EVALUATION
Case: [id number] Date/Time: 12/11/24 1126    Procedure: GASTROSCOPY    Pre-op diagnosis: Anemia    Location: [facility] ROOM 26 / SURGERY SAME DAY [facility]    Surgeons: Robert Bloom M.D.          48 yo F w/ T2DM, H. Pylori ulcer, admitted with melena    Hgb 7.7  Patient refuses blood for Rastafari reasons  Relevant Problems   NEURO   (positive) Morning headache      GI   (positive) H pylori ulcer      ENDO   (positive) Controlled type 2 diabetes mellitus without complication, without long-term current use of insulin (HCC)       Physical Exam    Airway   Mallampati: II  TM distance: >3 FB  Neck ROM: full       Cardiovascular - normal exam  Rhythm: regular  Rate: normal  (-) murmur     Dental - normal exam           Pulmonary - normal exam  Breath sounds clear to auscultation     Abdominal    Neurological - normal exam                   Anesthesia Plan    ASA 2- EMERGENT   ASA physical status emergent criteria: acute hemorrhage    Plan - MAC               Induction: intravenous    Postoperative Plan: Postoperative administration of opioids is intended.    Pertinent diagnostic labs and testing reviewed    Informed Consent:    Anesthetic plan and risks discussed with patient.    Use of blood products discussed with: patient whom consented to blood products.

## 2024-12-11 NOTE — ASSESSMENT & PLAN NOTE
Patient recently diagnosed with H. pylori by outside primary care physician.  She has not started her oral medications  She has downtrending hemoglobin levels.  She does endorse dark melanotic stools  Follow-up occult blood  Gastroenterology consult in morning  Trend hemoglobin every 4 hours  Transfuse for hemoglobin less than 7  Pantoprazole 40 mg IV twice daily  Continue with appropriate H. pylori treatment

## 2024-12-11 NOTE — CONSULTS
Date of Consultation:  12/11/2024    Patient: : Sybil Manuel  MRN: [id number]    Referring Physician:  Palak Godinez M.D.Attending      GI:Robert Bloom M.D.     Reason for Consultation: Anemia, melena, rule out upper GI bleeding.  H. pylori positive by the primary team.    History of Present Illness:   The patient is 49 years old female with medical history of recently diagnosed H. pylori infection, anemia with hemoglobin 7, colonoscopy at [facility] about 5 years ago was negative, presented inpatient GI service for melena, hemoglobin drop and possible upper GI bleeding.    Never had upper GI scope before    The patient refused blood transfusion.    We saw the patient at bedside, patient's  was in the room as well.    Regarding H. pylori, the patient started the quadruple therapy from yesterday.    Current NPO.    Usually 1 bowel movement a day, possible melena, but denied hematochezia, bright red blood per rectum, coffee-ground emesis or other overt GI bleeding.  Relative stable body weight.      Past Medical History:   Diagnosis Date    Multiple thyroid nodules 3/24/2023    Thyroid nodule 3/8/2023    COVID-19 6/27/2022    Date of symptoms onset: 6/15/2022  Symptoms: Headache, fatigue, runny nose, sinusitis, cough productive with small amount of clear sputum. Fever: no fever  Date of COVID positive test: 06/15/2022, 6/24/2022  Medications tried: Dayquil, cold and flue OTC medicine, Tylenol  Home max temperature: normal  Home O2 saturation: has been able to tolerate PO but with reduced appetite. Normal urination.  CO    Diabetes mellitus type II 9/3/2010    ASTHMA 9/3/2010    Vitamin D deficiency 2009    Elevated liver enzymes 2009    Depression 2008    no antidepressant    Allergy     Anxiety     Lorazepam rarely    GERD (gastroesophageal reflux disease)     occasional    Hyperlipidemia     Urinary tract infection, site not specified          Past Surgical History:   Procedure Laterality Date    ORIF, WRIST Right  1/27/2023    Procedure: RIGHT DISTAL RADIUS OPEN REDUCTION INTERNAL FIXATION, REPAIRS AS INDICATED;  Surgeon: Perri Orozco M.D.;  Location: [facility];  Service: Orthopedics    PRIMARY C SECTION      TUBAL COAGULATION LAPAROSCOPIC BILATERAL         Family History   Problem Relation Age of Onset    Heart Disease Mother         65 yo stents, pacemaker x2    Hypertension Mother     Hyperlipidemia Mother     Arrythmia Mother     Diabetes Brother     Hyperlipidemia Brother     Diabetes Maternal Grandmother     Cancer Neg Hx        Social History     Socioeconomic History    Marital status:    Tobacco Use    Smoking status: Never    Smokeless tobacco: Never   Vaping Use    Vaping status: Never Used   Substance and Sexual Activity    Alcohol use: Not Currently     Comment: 4 per year    Drug use: Never    Sexual activity: Yes     Partners: Male     Birth control/protection: Surgical     Social Drivers of Health     Financial Resource Strain: Unknown (2/18/2022)    Overall Financial Resource Strain (CARDIA)     Difficulty of Paying Living Expenses: Patient declined   Food Insecurity: Unknown (2/18/2022)    Hunger Vital Sign     Worried About Running Out of Food in the Last Year: Patient declined     Ran Out of Food in the Last Year: Patient declined   Transportation Needs: Unknown (2/18/2022)    PRAPARE - Transportation     Lack of Transportation (Medical): Patient declined     Lack of Transportation (Non-Medical): Patient declined   Physical Activity: Unknown (2/18/2022)    Exercise Vital Sign     Minutes of Exercise per Session: Patient declined   Social Connections: Socially Integrated (2/18/2022)    Social Connection and Isolation Panel [NHANES]     Frequency of Communication with Friends and Family: More than three times a week     Frequency of Social Gatherings with Friends and Family: Patient declined     Attends L[employer] day Services: 1 to 4 times per year     Active Member of  "Clubs or Organizations: Yes     Attends Club or Organization Meetings: 1 to 4 times per year     Marital Status:    Intimate Partner Violence: Not At Risk (12/11/2024)    Humiliation, Afraid, Rape, and Kick questionnaire     Fear of Current or Ex-Partner: No     Emotionally Abused: No     Physically Abused: No     Sexually Abused: No   Housing Stability: Unknown (2/18/2022)    Housing Stability Vital Sign     Unable to Pay for Housing in the Last Year: Patient refused     Number of Places Lived in the Last Year: 1     Unstable Housing in the Last Year: No           Physical Exam:  Vitals:    12/10/24 2259 12/10/24 2352 12/11/24 0425 12/11/24 0831   BP: 131/67 (!) 141/83 93/61 113/67   Pulse: 69 77 71 78   Resp: 15 16 16 16   Temp:  36.2 °C (97.2 °F) 36.8 °C (98.2 °F) 36.8 °C (98.3 °F)   TempSrc:  Temporal Temporal Temporal   SpO2: 97% 99% 96% 96%   Weight:  62.3 kg (137 lb 5.6 oz) 62.3 kg (137 lb 5.6 oz)    Height:  1.575 m (5' 2\")         Constitutional: No fevers.  Eyes: No symptoms reported.   Ears/Nose/Throat/Mouth: No choking reported.  Cardiovascular: No chest pain reported.   Respiratory: Denies shortness of breath.  Gastrointestinal/Hepatic: Per H/P.   Skin/Breast: No new rash reported.   Psychiatric: No complaints    HEENT: grossly normal.  Cardiovascular: Please refer to primary team's daily assessment.   Lungs: Please refer to primary team's daily assessment.   Abdomen: Deferred, will do in the OR.  Skin: No erythema, No rash.  Neurologic: Alert & oriented x 3, Normal motor function, No focal deficits noted.  PSY: stable mood.         Labs:  Recent Labs     12/10/24  2025 12/10/24  2232 12/11/24  0208 12/11/24  0603   WBC 5.8  --  6.0  --    RBC 4.23  --  3.98*  --    HEMOGLOBIN 7.8* 7.4* 7.4* 7.7*   HEMATOCRIT 27.5* 25.9* 25.9* 27.5*   MCV 65.0*  --  65.1*  --    MCH 18.4*  --  18.6*  --    MCHC 28.4*  --  28.6*  --    RDW 43.4  --  43.8  --    PLATELETCT 542*  --  479*  --    MPV 9.2  --  9.1  -- "      Recent Labs     12/10/24  2025 12/11/24  0208   SODIUM 137 138   POTASSIUM 3.6 3.6   CHLORIDE 100 102   CO2 23 22   GLUCOSE 204* 94   BUN 11 10           Recent Labs     12/10/24  2025 12/11/24  0208   ASTSGOT 26 25   ALTSGPT 30 25   TBILIRUBIN <0.2 <0.2   ALKPHOSPHAT 68 59   GLOBULIN 3.4 2.9         Imaging:  NM-GASTRIC EMPTYING  Narrative: 12/9/2024 1:00 PM    HISTORY/REASON FOR EXAM:  nausea.    TECHNIQUE/EXAM DESCRIPTION AND NUMBER OF VIEWS:  0.51 uCi Tc 99m sulfur colloid was administered orally mixed with solid.  90 minutes of planar imaging was then performed.    COMPARISON: None    FINDINGS:  There was no evidence of gastro-esophageal reflux during 60 minutes of continuous observation.  Half time of gastric emptying is measured at 66.2 minutes.  Impression: Gastric emptying halftime measured at 66.2 minutes.            Impressions:  The patient is 49 years old female with medical history of recently diagnosed H. pylori infection, anemia with hemoglobin 7, colonoscopy at [facility] about 5 years ago was negative, presented inpatient GI service for melena, hemoglobin drop and possible upper GI bleeding.    Never had upper GI scope before    Continue n.p.o., PPI, plan to have upper GI scope today in OR.    Diagnosis: upper gastrointestinal bleeding.   Procedure: Esophagogastroduodenoscopy with bleeding control including banding.         This note was generated using voice recognition software which has a small chance of producing errors of grammar and possibly content. I have made every reasonable attempt to find and correct any obvious errors, but expect that some may not be found prior to finalization of this note.

## 2024-12-11 NOTE — PROGRESS NOTES
4 Eyes Skin Assessment Completed by PRABHAKAR Dexter and PRABHAKAR Khoury.    Head WDL  Ears WDL  Nose WDL  Mouth WDL  Neck WDL  Breast/Chest WDL  Shoulder Blades WDL  Spine WDL  (R) Arm/Elbow/Hand WDL  (L) Arm/Elbow/Hand WDL  Abdomen WDL  Groin WDL  Scrotum/Coccyx/Buttocks WDL  (R) Leg WDL  (L) Leg WDL  (R) Heel/Foot/Toe Blanching  (L) Heel/Foot/Toe Blanching          Devices In Places Tele Box, Blood Pressure Cuff, and Pulse Ox      Interventions In Place Pillows    Possible Skin Injury No    Pictures Uploaded Into Epic N/A  Wound Consult Placed N/A  RN Wound Prevention Protocol Ordered No     no

## 2024-12-11 NOTE — OR NURSING
1312-Pt arrive to PACU from Endo on 5L O2 via mask with bite block. Report received from Endo RN and anesthesia. Bite block removed.     1318-MD to bedside. Pt continues to sleep     1320-Pt wakening, to RA. Pt denies pain or nausea.    1333-Water given, tolerated. Message to primary RN    1340- Attempt to call report to floor RN & apprentice    1345-MD to bedside    1348- Attempt to call report to floor RN & apprentice. RN to call back shortly    1352-Report given to floor RNAzalia. All questions answered.    1359-Pt off floor via transport with family on RA. All belongings with pt

## 2024-12-11 NOTE — ED TRIAGE NOTES
"Chief Complaint   Patient presents with    Sent by MD     Sent by MD for HgB of 7.7, drawn on 12/6.      50 y/o female sent by MD for HgB of 7.7 drawn on 12/6. Pt was seen at her PCP for positive H.pylori. +N/V/D, +abd pain. Pt reports \"dark\" stools. Denies blood in vomit. -thinners. -dysuria. Pt denies chest pain.   "

## 2024-12-11 NOTE — ED NOTES
Med Rec completed per pt and list at bedside     Allergies reviewed: yes    Antibiotics in the past 30 days: no    Anticoagulant in past 14 days: no    Pharmacy patient utilizes: [facility]   [phone]    Patient has not used her Oxycodone for a month. Just has it if needed for pain

## 2024-12-11 NOTE — ANESTHESIA POSTPROCEDURE EVALUATION
Patient: Sbyil Manuel    Procedure Summary       Date: 12/11/24 Room / Location: [facility] ROOM 26 / SURGERY SAME DAY [facility]    Anesthesia Start: 1300 Anesthesia Stop: 1315    Procedures:       GASTROSCOPY (Esophagus)      GASTROSCOPY, WITH BIOPSY (Esophagus) Diagnosis: (gastritis)    Surgeons: Robert Bloom M.D. Responsible Provider: Efren Perez MD, PhD    Anesthesia Type: MAC ASA Status: 2 - Emergent            Final Anesthesia Type: MAC  Last vitals  BP   Blood Pressure: 118/62    Temp   36.3 °C (97.3 °F)    Pulse   65   Resp   18    SpO2   100 %      Anesthesia Post Evaluation    Patient location during evaluation: PACU  Patient participation: complete - patient participated  Level of consciousness: awake  Pain score: 0    Airway patency: patent  Anesthetic complications: no  Cardiovascular status: hemodynamically stable  Respiratory status: acceptable  Hydration status: acceptable    PONV: none          No notable events documented.     Nurse Pain Score: 0 (NPRS)

## 2024-12-12 VITALS
HEART RATE: 86 BPM | DIASTOLIC BLOOD PRESSURE: 86 MMHG | OXYGEN SATURATION: 95 % | SYSTOLIC BLOOD PRESSURE: 136 MMHG | WEIGHT: 137.57 LBS | RESPIRATION RATE: 16 BRPM | HEIGHT: 62 IN | TEMPERATURE: 97.7 F | BODY MASS INDEX: 25.32 KG/M2

## 2024-12-12 LAB
ANION GAP SERPL CALC-SCNC: 11 MMOL/L (ref 7–16)
ANISOCYTOSIS BLD QL SMEAR: ABNORMAL
BUN SERPL-MCNC: 10 MG/DL (ref 8–22)
CALCIUM SERPL-MCNC: 8.8 MG/DL (ref 8.5–10.5)
CHLORIDE SERPL-SCNC: 101 MMOL/L (ref 96–112)
CO2 SERPL-SCNC: 24 MMOL/L (ref 20–33)
CREAT SERPL-MCNC: 0.68 MG/DL (ref 0.5–1.4)
ERYTHROCYTE [DISTWIDTH] IN BLOOD BY AUTOMATED COUNT: 43.9 FL (ref 35.9–50)
GFR SERPLBLD CREATININE-BSD FMLA CKD-EPI: 106 ML/MIN/1.73 M 2
GLUCOSE BLD STRIP.AUTO-MCNC: 122 MG/DL (ref 65–99)
GLUCOSE BLD STRIP.AUTO-MCNC: 141 MG/DL (ref 65–99)
GLUCOSE SERPL-MCNC: 107 MG/DL (ref 65–99)
HCT VFR BLD AUTO: 26.5 % (ref 37–47)
HGB BLD-MCNC: 7.3 G/DL (ref 12–16)
HYPOCHROMIA BLD QL SMEAR: ABNORMAL
MAGNESIUM SERPL-MCNC: 2 MG/DL (ref 1.5–2.5)
MCH RBC QN AUTO: 18.1 PG (ref 27–33)
MCHC RBC AUTO-ENTMCNC: 27.5 G/DL (ref 32.2–35.5)
MCV RBC AUTO: 65.6 FL (ref 81.4–97.8)
MICROCYTES BLD QL SMEAR: ABNORMAL
MORPHOLOGY BLD-IMP: NORMAL
OVALOCYTES BLD QL SMEAR: ABNORMAL
PHOSPHATE SERPL-MCNC: 3.9 MG/DL (ref 2.5–4.5)
PLATELET # BLD AUTO: 422 K/UL (ref 164–446)
PLATELET BLD QL SMEAR: NORMAL
PMV BLD AUTO: 9.4 FL (ref 9–12.9)
POIKILOCYTOSIS BLD QL SMEAR: ABNORMAL
POTASSIUM SERPL-SCNC: 4.1 MMOL/L (ref 3.6–5.5)
RBC # BLD AUTO: 4.04 M/UL (ref 4.2–5.4)
RBC BLD AUTO: PRESENT
SODIUM SERPL-SCNC: 136 MMOL/L (ref 135–145)
TARGETS BLD QL SMEAR: ABNORMAL
WBC # BLD AUTO: 5.8 K/UL (ref 4.8–10.8)

## 2024-12-12 PROCEDURE — 84100 ASSAY OF PHOSPHORUS: CPT

## 2024-12-12 PROCEDURE — 700102 HCHG RX REV CODE 250 W/ 637 OVERRIDE(OP): Performed by: STUDENT IN AN ORGANIZED HEALTH CARE EDUCATION/TRAINING PROGRAM

## 2024-12-12 PROCEDURE — 82962 GLUCOSE BLOOD TEST: CPT | Mod: 91

## 2024-12-12 PROCEDURE — A9270 NON-COVERED ITEM OR SERVICE: HCPCS | Performed by: STUDENT IN AN ORGANIZED HEALTH CARE EDUCATION/TRAINING PROGRAM

## 2024-12-12 PROCEDURE — 85027 COMPLETE CBC AUTOMATED: CPT

## 2024-12-12 PROCEDURE — 36415 COLL VENOUS BLD VENIPUNCTURE: CPT

## 2024-12-12 PROCEDURE — 700111 HCHG RX REV CODE 636 W/ 250 OVERRIDE (IP): Performed by: STUDENT IN AN ORGANIZED HEALTH CARE EDUCATION/TRAINING PROGRAM

## 2024-12-12 PROCEDURE — 80048 BASIC METABOLIC PNL TOTAL CA: CPT

## 2024-12-12 PROCEDURE — A9270 NON-COVERED ITEM OR SERVICE: HCPCS | Performed by: NURSE PRACTITIONER

## 2024-12-12 PROCEDURE — 700105 HCHG RX REV CODE 258: Performed by: STUDENT IN AN ORGANIZED HEALTH CARE EDUCATION/TRAINING PROGRAM

## 2024-12-12 PROCEDURE — 700102 HCHG RX REV CODE 250 W/ 637 OVERRIDE(OP): Performed by: NURSE PRACTITIONER

## 2024-12-12 PROCEDURE — 700111 HCHG RX REV CODE 636 W/ 250 OVERRIDE (IP): Mod: JZ | Performed by: STUDENT IN AN ORGANIZED HEALTH CARE EDUCATION/TRAINING PROGRAM

## 2024-12-12 PROCEDURE — 83735 ASSAY OF MAGNESIUM: CPT

## 2024-12-12 PROCEDURE — 99239 HOSP IP/OBS DSCHRG MGMT >30: CPT | Performed by: STUDENT IN AN ORGANIZED HEALTH CARE EDUCATION/TRAINING PROGRAM

## 2024-12-12 RX ORDER — VONOPRAZAN FUMARATE, AMOXICILLIN AND CLARITHROMYCIN 20-500-500
1 KIT ORAL 2 TIMES DAILY
Qty: 14 EACH | Refills: 0 | Status: ACTIVE | OUTPATIENT
Start: 2024-12-12 | End: 2024-12-12

## 2024-12-12 RX ORDER — TETRACYCLINE HYDROCHLORIDE 500 MG/1
500 CAPSULE ORAL EVERY 6 HOURS
Qty: 56 CAPSULE | Refills: 0 | Status: ACTIVE | OUTPATIENT
Start: 2024-12-12 | End: 2024-12-26

## 2024-12-12 RX ORDER — METRONIDAZOLE 500 MG/1
500 TABLET ORAL 3 TIMES DAILY
Qty: 42 TABLET | Refills: 0 | Status: ACTIVE | OUTPATIENT
Start: 2024-12-12 | End: 2024-12-26

## 2024-12-12 RX ORDER — VONOPRAZAN FUMARATE, AMOXICILLIN AND CLARITHROMYCIN 20-500-500
1 KIT ORAL 2 TIMES DAILY
Status: ON HOLD | COMMUNITY
End: 2024-12-12

## 2024-12-12 RX ORDER — FERROUS SULFATE 325(65) MG
325 TABLET, DELAYED RELEASE (ENTERIC COATED) ORAL DAILY
Qty: 30 TABLET | Refills: 0 | Status: SHIPPED | OUTPATIENT
Start: 2024-12-12 | End: 2025-01-11

## 2024-12-12 RX ORDER — PANTOPRAZOLE SODIUM 40 MG/1
40 TABLET, DELAYED RELEASE ORAL DAILY
Qty: 14 TABLET | Refills: 0 | Status: SHIPPED | OUTPATIENT
Start: 2024-12-12 | End: 2024-12-26

## 2024-12-12 RX ADMIN — METRONIDAZOLE 500 MG: 500 TABLET ORAL at 15:38

## 2024-12-12 RX ADMIN — METRONIDAZOLE 500 MG: 500 TABLET ORAL at 08:11

## 2024-12-12 RX ADMIN — PREGABALIN 25 MG: 25 CAPSULE ORAL at 05:32

## 2024-12-12 RX ADMIN — PANTOPRAZOLE SODIUM 40 MG: 40 INJECTION, POWDER, FOR SOLUTION INTRAVENOUS at 05:39

## 2024-12-12 RX ADMIN — BISMUTH SUBSALICYLATE 524 MG: 525 LIQUID ORAL at 13:15

## 2024-12-12 RX ADMIN — BISMUTH SUBSALICYLATE 524 MG: 525 LIQUID ORAL at 08:12

## 2024-12-12 RX ADMIN — ACETAMINOPHEN 650 MG: 325 TABLET ORAL at 08:14

## 2024-12-12 RX ADMIN — SODIUM CHLORIDE 250 MG: 9 INJECTION, SOLUTION INTRAVENOUS at 16:00

## 2024-12-12 RX ADMIN — TETRACYCLINE HYDROCHLORIDE 500 MG: 500 CAPSULE ORAL at 08:11

## 2024-12-12 RX ADMIN — SODIUM CHLORIDE 250 MG: 9 INJECTION, SOLUTION INTRAVENOUS at 05:41

## 2024-12-12 RX ADMIN — TETRACYCLINE HYDROCHLORIDE 500 MG: 500 CAPSULE ORAL at 15:38

## 2024-12-12 RX ADMIN — EPOETIN ALFA-EPBX 40000 UNITS: 40000 INJECTION, SOLUTION INTRAVENOUS; SUBCUTANEOUS at 13:15

## 2024-12-12 RX ADMIN — TETRACYCLINE HYDROCHLORIDE 500 MG: 500 CAPSULE ORAL at 03:48

## 2024-12-12 ASSESSMENT — PAIN DESCRIPTION - PAIN TYPE
TYPE: ACUTE PAIN
TYPE: ACUTE PAIN

## 2024-12-12 ASSESSMENT — FIBROSIS 4 INDEX: FIB4 SCORE: 0.59

## 2024-12-12 NOTE — DISCHARGE SUMMARY
Discharge Summary    CHIEF COMPLAINT ON ADMISSION  Chief Complaint   Patient presents with    Sent by MD     Sent by MD for HgB of 7.7, drawn on 12/6.        Reason for Admission  abnormal labs     Admission Date  12/10/2024    CODE STATUS  Full Code    HPI & HOSPITAL COURSE  Sybil Manuel is a 49 y.o. female who presented 12/10/2024 at the recommendation of her primary care physician.  Patient was recently seen by her primary care physician, where she was tested for H. pylori.  She tested positive, was supposed to start on oral outpatient regimen, but she has yet to do so.  Her primary care physician told her to come to the emergency department due to concerning hemoglobin levels at 7.6.  she has noticed some dark tarry stools over the last several days.  GI consulted, s/p EGD, mild gastritis, negative for ulcer or active bleeding.  Patient had a colonoscopy at the [facility] 4 to 5 years ago and no significant abnormality per the patient.  Patient will continue H. pylori treatment.   She was found significant iron deficiency, received IV iron infusion.  Will continue oral iron supplement.  She does report heavy menstrual bleeding.  She was advised to follow-up with OB/GYN.   She is bloodless and does not accept blood transfusion.  She requested epo injection.  I discussed with pharmacy, she received 1 dose prior to discharge.   She was advised to follow-up with primary care for further management.      I ordered anemia workup, significant for iron deficiency.  I ordered IV iron replacement     Patient is bloodless.  No blood transfusion.  Okay with our infusion    Therefore, she is discharged in good and stable condition to home with close outpatient follow-up.    The patient met 2-midnight criteria for an inpatient stay at the time of discharge.    Discharge Date  12/12/2024    FOLLOW UP ITEMS POST DISCHARGE  - Follow up with primary care physician in 1 week and recheck CBC  - f/u with ob/gyn for heavy menstrual  bleeding  - Please take the medications as instructed    - Go to the local Emergency Department if you have any worsening condition.     DISCHARGE DIAGNOSES  Principal Problem:    GI bleed (POA: Yes)  Active Problems:    Nausea (POA: Yes)    H pylori ulcer (POA: Unknown)    Acute blood loss anemia (POA: Unknown)    Iron deficiency anemia (POA: Unknown)  Resolved Problems:    * No resolved hospital problems. *      FOLLOW UP  Future Appointments   Date Time Provider Department Center   1/14/2025  9:20 AM Perla Dixon M.D. [address] M.D.  [address] [address]  [phone]    Follow up in 1 week(s)  Please call your primary care provider to schedule, recent hospitalization follow up, recheck lab CBC in a week      MEDICATIONS ON DISCHARGE     Medication List        START taking these medications        Instructions   bismuth subsalicylate 262 MG/15ML Susp  Commonly known as: Pepto-Bismol   Take 30 mL by mouth 4 times a day for 14 days.  Dose: 30 mL     ferrous sulfate 325 (65 Fe) MG EC tablet   Take 1 Tablet by mouth every day for 30 days.  Dose: 325 mg     metroNIDAZOLE 500 MG Tabs  Commonly known as: Flagyl   Take 1 Tablet by mouth in the morning, at noon, and at bedtime for 14 days.  Dose: 500 mg     pantoprazole 40 MG Tbec  Commonly known as: Protonix   Take 1 Tablet by mouth every day for 14 days.  Dose: 40 mg     tetracycline 500 MG Caps  Commonly known as: Sumycin   Take 1 Capsule by mouth every 6 hours for 14 days.  Dose: 500 mg            CONTINUE taking these medications        Instructions   metFORMIN  MG Tb24  Commonly known as: Glucophage XR   Take 2 Tablets by mouth 2 times a day.  Dose: 1,000 mg     ondansetron 4 MG Tbdp  Commonly known as: Zofran ODT   Take 1 Tablet by mouth every 6 hours as needed for Nausea/Vomiting.  Dose: 4 mg     Ozempic (0.25 or 0.5 MG/DOSE) 2 MG/3ML Sopn  Generic drug: Semaglutide(0.25 or 0.5MG/DOS)   Inject 0.5 mg  under the skin every 7 days.  Dose: 0.5 mg     pravastatin 40 MG tablet  Commonly known as: Pravachol   Take 1 Tablet by mouth every evening.  Dose: 40 mg     * pregabalin 25 MG Caps  Commonly known as: Lyrica   Take 1 Capsule by mouth 2 times a day as needed (pain).  Dose: 1 Capsule     * pregabalin 150 MG Caps  Commonly known as: Lyrica   Take 150 mg by mouth at bedtime as needed (pain).  Dose: 150 mg     triamcinolone acetonide 0.1 % Crea  Commonly known as: Kenalog   Apply 1 Application topically 2 times a day.  Dose: 1 Application           * This list has 2 medication(s) that are the same as other medications prescribed for you. Read the directions carefully, and ask your doctor or other care provider to review them with you.                STOP taking these medications      Amoxicill&Clarithro&Vonoprazan 500 & 500 & 20 MG Thpk     ibuprofen 600 MG Tabs  Commonly known as: Motrin     omeprazole 20 MG delayed-release capsule  Commonly known as: PriLOSEC     Voquezna Triple Dionicio 500-500-20 MG Thpk  Generic drug: Amoxicill&Clarithro&Vonoprazan            ASK your doctor about these medications        Instructions   albuterol 108 (90 Base) MCG/ACT Aers inhalation aerosol   INHALE 2 PUFFS BY MOUTH EVERY 4 HOURS AS NEEDED FOR SHORTNESS OF BREATH  Dose: 2 Puff     vitamin D2 (Ergocalciferol) 1.25 MG (96010 UT) Caps capsule  Commonly known as: Drisdol   Take 1 Capsule by mouth every 7 days.  Dose: 50,000 Units              Allergies  Allergies   Allergen Reactions    Bloodless        DIET  Orders Placed This Encounter   Procedures    Diet Order Diet: Regular     Standing Status:   Standing     Number of Occurrences:   1     Order Specific Question:   Diet:     Answer:   Regular [1]       ACTIVITY  As tolerated.  Weight bearing as tolerated    CONSULTATIONS  GI    PROCEDURES  EGD    LABORATORY  Lab Results   Component Value Date    SODIUM 136 12/12/2024    POTASSIUM 4.1 12/12/2024    CHLORIDE 101 12/12/2024    CO2 24  12/12/2024    GLUCOSE 107 (H) 12/12/2024    BUN 10 12/12/2024    CREATININE 0.68 12/12/2024    CREATININE 0.67 07/20/2010    GLOMRATE >59 07/20/2010        Lab Results   Component Value Date    WBC 5.8 12/12/2024    WBC 6.1 07/20/2010    HEMOGLOBIN 7.3 (L) 12/12/2024    HEMATOCRIT 26.5 (L) 12/12/2024    PLATELETCT 422 12/12/2024        Total time of the discharge process exceeds 35 minutes.

## 2024-12-12 NOTE — DISCHARGE INSTRUCTIONS
- Follow up with primary care physician in 1 week and recheck CBC  - f/u with ob/gyn for heavy menstrual bleeding  - Please take the medications as instructed    - Go to the local Emergency Department if you have any worsening condition.

## 2024-12-12 NOTE — CARE PLAN
The patient is Stable - Low risk of patient condition declining or worsening    Shift Goals  Clinical Goals: Monitor H & H, comfort  Patient Goals: Plan of care, single troom, comfort  Family Goals: plan of care    Progress made toward(s) clinical / shift goals:      Problem: Knowledge Deficit - Standard  Goal: Patient and family/care givers will demonstrate understanding of plan of care, disease process/condition, diagnostic tests and medications  Outcome: Progressing     Problem: Hemodynamics  Goal: Patient's hemodynamics, fluid balance and neurologic status will be stable or improve  Outcome: Progressing     Problem: Pain - Standard  Goal: Alleviation of pain or a reduction in pain to the patient’s comfort goal  Outcome: Progressing       Patient is not progressing towards the following goals:

## 2024-12-12 NOTE — PROGRESS NOTES
Report given to Dia RADFORD. All questions answered. Patient transported to room 734 with all belongings.  and daughter at bedside. No other needs at this time.

## 2024-12-12 NOTE — PROGRESS NOTES
Monitor Summary:     Rhythm: SR  Rate: 64-81  Ectopy: n/a  Measurements: 0.13/0.07/0.41              12 Hour Chart Check

## 2024-12-12 NOTE — CARE PLAN
The patient is Stable - Low risk of patient condition declining or worsening    Shift Goals  Clinical Goals: monitor Hgb  Patient Goals: rest and go home  Family Goals: plan of care    Progress made toward(s) clinical / shift goals:        Problem: Knowledge Deficit - Standard  Goal: Patient and family/care givers will demonstrate understanding of plan of care, disease process/condition, diagnostic tests and medications  Outcome: Progressing   Pt and family educated on POC, all questions answered in regards to disease process, treatment and diet. Pt and family verbalize understanding and voice no further questions at this time.    Problem: Hemodynamics  Goal: Patient's hemodynamics, fluid balance and neurologic status will be stable or improve  Outcome: Progressing   Vital signs remained stable through out shift.     Patient is not progressing towards the following goals:

## 2024-12-12 NOTE — PROGRESS NOTES
Hospital Medicine Daily Progress Note    Date of Service  12/11/2024    Chief Complaint  Sybil Manuel is a 49 y.o. female admitted 12/10/2024 with GI bleeding    Hospital Course  Sybil Manuel is a 49 y.o. female who presented 12/10/2024 at the recommendation of her primary care physician.  Patient was recently seen by her primary care physician, where she was tested for H. pylori.  She tested positive, was supposed to start on oral outpatient regimen, but she has yet to do so.  Her primary care physician told her to come to the emergency department due to concerning hemoglobin levels at 7.6.  While in our emergency department, patient does complain of some fatigue.  Her vital signs are stable.  She states that she has noticed some dark tarry stools over the last several days.  Denies any hematemesis or bright red bloody bowel movements.  Patient will be admitted for management of acute blood loss anemia secondary to GI bleed.  She will need gastroenterology consult in the morning.     Interval Problem Update  I saw and examined the patient bedside  I discussed the care plan with the family at the bedside    Hb 7.4 (Hb 13 in 2022)  I discussed with GI, plan to have EGD     I ordered anemia workup, significant for iron deficiency.  I ordered IV iron replacement    Patient is bloodless.  No blood transfusion.  Okay with our infusion      I have discussed this patient's plan of care and discharge plan at IDT rounds today with Case Management, Nursing, Nursing leadership, and other members of the IDT team.    Consultants/Specialty  GI    Code Status  Full Code    Disposition  The patient is not medically cleared for discharge to home or a post-acute facility.      I have placed the appropriate orders for post-discharge needs.    Review of Systems  ROS     Physical Exam  Temp:  [36.1 °C (96.9 °F)-37.1 °C (98.8 °F)] 37.1 °C (98.8 °F)  Pulse:  [63-86] 80  Resp:  [14-22] 18  BP: ()/(61-88) 128/65  SpO2:  [96 %-100  %] 99 %    Physical Exam    Fluids    Intake/Output Summary (Last 24 hours) at 12/11/2024 1734  Last data filed at 12/11/2024 1440  Gross per 24 hour   Intake 640 ml   Output --   Net 640 ml        Laboratory  Recent Labs     12/10/24  2025 12/10/24  2232 12/11/24  0208 12/11/24  0603 12/11/24  1047 12/11/24  1517   WBC 5.8  --  6.0  --   --   --    RBC 4.23  --  3.98*  --   --   --    HEMOGLOBIN 7.8*   < > 7.4* 7.7* 7.4* 7.7*   HEMATOCRIT 27.5*   < > 25.9* 27.5* 26.9* 27.8*   MCV 65.0*  --  65.1*  --   --   --    MCH 18.4*  --  18.6*  --   --   --    MCHC 28.4*  --  28.6*  --   --   --    RDW 43.4  --  43.8  --   --   --    PLATELETCT 542*  --  479*  --   --   --    MPV 9.2  --  9.1  --   --   --     < > = values in this interval not displayed.     Recent Labs     12/10/24  2025 12/11/24  0208   SODIUM 137 138   POTASSIUM 3.6 3.6   CHLORIDE 100 102   CO2 23 22   GLUCOSE 204* 94   BUN 11 10   CREATININE 0.54 0.53   CALCIUM 9.4 8.8                   Imaging  No orders to display        Assessment/Plan  * GI bleed- (present on admission)  Assessment & Plan  Patient recently diagnosed with H. pylori by outside primary care physician.  She has not started her oral medications  She has downtrending hemoglobin levels.  She does endorse dark melanotic stools  Follow-up occult blood  Gastroenterology consult in morning  Trend hemoglobin every 4 hours  Transfuse for hemoglobin less than 7  Pantoprazole 40 mg IV twice daily  Continue with appropriate H. pylori treatment    Iron deficiency anemia  Assessment & Plan  Anemia workup to be significant iron deficiency  I ordered IV iron infusion  monitor side effects    Acute blood loss anemia  Assessment & Plan  Secondary to GI bleed  Trend hemoglobin every 4 hours  Transfuse for hemoglobin less than 7    H pylori ulcer  Assessment & Plan  Continue with pantoprazole, Flagyl, Pepto-Bismol, tetracycline    Nausea- (present on admission)  Assessment & Plan  Continue with IV and p.o.  antiemetics         VTE prophylaxis: SCD  GI bleeding    I have performed a physical exam and reviewed and updated ROS and Plan today (12/11/2024). In review of yesterday's note (12/10/2024), there are no changes except as documented above.    I spent greater than 53 minutes for chart review, obtaining history independently, performing medically appropriate examination,  documenting , ordering medications, tests, or procedures, referring and communicating with other health care professionals, Independently interpreting results and communicating results with patient/family/caregiver. More than 50% of time was spent in face-to-face clinical encounter.

## 2024-12-12 NOTE — CARE PLAN
The patient is Stable - Low risk of patient condition declining or worsening    Shift Goals  Clinical Goals: monitor Hgb  Patient Goals: rest and go home  Family Goals: plan of care    Progress made toward(s) clinical / shift goals:    Problem: Hemodynamics  Goal: Patient's hemodynamics, fluid balance and neurologic status will be stable or improve  Outcome: Progressing     Problem: Respiratory  Goal: Patient will achieve/maintain optimum respiratory ventilation and gas exchange  Outcome: Progressing     Problem: Safety  Goal: Will remain free from injury  Outcome: Progressing       Patient is not progressing towards the following goals:

## 2024-12-12 NOTE — PROGRESS NOTES
Assumed care of patient. Chart, labs, and orders reviewed. Pt resting in bed, breathing even and unlabored, denies pain and in no acute distress. A&O x4. Tele monitoring in place. Fall precautions in place and education provided. Call light within reach, bed locked and in lowest position, denies other needs at this time.

## 2024-12-12 NOTE — PROGRESS NOTES
Bedside shift report received from day shift RN. Pt care assumed. A&O x 4. Calm and cooperative.  at bedside. No complaints of pain. Bed in low and locked position. Call light and belongings within reach. All pt concerns addressed. No further assistance needed at this time.

## 2024-12-13 NOTE — PROGRESS NOTES
Discharge instructions reviewed and signed. IV and tele monitor removed. Patient given a work excuse note. Pt. Discharged home with .

## 2024-12-17 ENCOUNTER — APPOINTMENT (OUTPATIENT)
Dept: URBAN - METROPOLITAN AREA CLINIC 15 | Facility: CLINIC | Age: 49
Setting detail: DERMATOLOGY
End: 2024-12-17

## 2024-12-17 ENCOUNTER — APPOINTMENT (OUTPATIENT)
Dept: MEDICAL GROUP | Age: 49
End: 2024-12-17
Payer: COMMERCIAL

## 2024-12-17 ENCOUNTER — OFFICE VISIT (OUTPATIENT)
Dept: MEDICAL GROUP | Age: 49
End: 2024-12-17
Payer: COMMERCIAL

## 2024-12-17 VITALS
DIASTOLIC BLOOD PRESSURE: 74 MMHG | BODY MASS INDEX: 25.4 KG/M2 | RESPIRATION RATE: 20 BRPM | HEART RATE: 86 BPM | TEMPERATURE: 98.9 F | SYSTOLIC BLOOD PRESSURE: 130 MMHG | HEIGHT: 62 IN | OXYGEN SATURATION: 98 % | WEIGHT: 138 LBS

## 2024-12-17 DIAGNOSIS — R20.2 PARESTHESIA OF SKIN: ICD-10-CM | Status: INADEQUATELY CONTROLLED

## 2024-12-17 DIAGNOSIS — A04.8 H. PYLORI INFECTION: ICD-10-CM

## 2024-12-17 DIAGNOSIS — N92.1 MENORRHAGIA WITH IRREGULAR CYCLE: ICD-10-CM

## 2024-12-17 DIAGNOSIS — D50.9 IRON DEFICIENCY ANEMIA, UNSPECIFIED IRON DEFICIENCY ANEMIA TYPE: ICD-10-CM

## 2024-12-17 PROBLEM — N92.0 MENORRHAGIA: Status: ACTIVE | Noted: 2024-12-17

## 2024-12-17 PROCEDURE — 99202 OFFICE O/P NEW SF 15 MIN: CPT

## 2024-12-17 PROCEDURE — ? ADDITIONAL NOTES

## 2024-12-17 PROCEDURE — ? COUNSELING

## 2024-12-17 PROCEDURE — 99214 OFFICE O/P EST MOD 30 MIN: CPT | Performed by: INTERNAL MEDICINE

## 2024-12-17 PROCEDURE — ? RECOMMENDATIONS

## 2024-12-17 PROCEDURE — ? EDUCATIONAL RESOURCES PROVIDED

## 2024-12-17 PROCEDURE — 3078F DIAST BP <80 MM HG: CPT | Performed by: INTERNAL MEDICINE

## 2024-12-17 PROCEDURE — 3075F SYST BP GE 130 - 139MM HG: CPT | Performed by: INTERNAL MEDICINE

## 2024-12-17 ASSESSMENT — LOCATION ZONE DERM: LOCATION ZONE: TRUNK

## 2024-12-17 ASSESSMENT — ENCOUNTER SYMPTOMS
ABDOMINAL PAIN: 0
CONSTIPATION: 0
FEVER: 0
DIARRHEA: 0
CHILLS: 0
VOMITING: 0
NAUSEA: 0
BLOOD IN STOOL: 0

## 2024-12-17 ASSESSMENT — LOCATION DETAILED DESCRIPTION DERM: LOCATION DETAILED: LEFT SUPERIOR MEDIAL MIDBACK

## 2024-12-17 ASSESSMENT — FIBROSIS 4 INDEX: FIB4 SCORE: 0.59

## 2024-12-17 ASSESSMENT — LOCATION SIMPLE DESCRIPTION DERM: LOCATION SIMPLE: LEFT LOWER BACK

## 2024-12-17 NOTE — PROGRESS NOTES
CC:   Chief Complaint   Patient presents with    [facility] Follow-up     I feel a little bit better, I am still very tired.      Diagnoses of Iron deficiency anemia, unspecified iron deficiency anemia type, H. pylori infection, and Menorrhagia with irregular cycle were pertinent to this visit.  Verbal consent was acquired by the patient to use [employer] ambient listening note generation during this visit Yes     History of Present Illness  Sybil is a pleasant 49 y.o. female presenting for a hospital discharge follow-up.     She reports an improvement in her abdominal discomfort. During her hospital stay, she received infusion.  Her hemoglobin level had decreased to 7.2, prompting her to request erythropoietin (EPO) treatment, which was initially declined but later administered.     An endoscopy revealed no ulcers but indicated mild inflammation of the gastric lining. She has been diagnosed with H. pylori and is currently on day 6 of a 14-day antibiotic regimen. She is also taking pantoprazole.     She has a long-standing history of menorrhagia, which is suspected to be contributing to her iron deficiency. She has an upcoming appointment with her OB/GYN. She was discharged from the hospital on Thursday after receiving an iron infusion. She is currently taking oral iron supplements, 325 mg daily, without any associated constipation. However, she occasionally experiences nausea and a bitter taste in her mouth due to the medication.      Past Medical History:   Diagnosis Date    Allergy     Anxiety     Lorazepam rarely    ASTHMA 9/3/2010    COVID-19 6/27/2022    Date of symptoms onset: 6/15/2022  Symptoms: Headache, fatigue, runny nose, sinusitis, cough productive with small amount of clear sputum. Fever: no fever  Date of COVID positive test: 06/15/2022, 6/24/2022  Medications tried: Dayquil, cold and flue OTC medicine, Tylenol  Home max temperature: normal  Home O2 saturation: has been  able to tolerate PO but with reduced appetite. Normal urination.  CO    Depression 2008    no antidepressant    Diabetes mellitus type II 9/3/2010    Elevated liver enzymes 2009    GERD (gastroesophageal reflux disease)     occasional    Hyperlipidemia     Multiple thyroid nodules 3/24/2023    Thyroid nodule 3/8/2023    Urinary tract infection, site not specified     Vitamin D deficiency 2009       Current Outpatient Medications Ordered in Epic   Medication Sig Dispense Refill    ferrous sulfate 325 (65 Fe) MG EC tablet Take 1 Tablet by mouth every day for 30 days. 30 Tablet 0    bismuth subsalicylate (PEPTO-BISMOL) 262 MG/15ML Suspension Take 30 mL by mouth 4 times a day for 14 days. 1680 mL 0    metroNIDAZOLE (FLAGYL) 500 MG Tab Take 1 Tablet by mouth in the morning, at noon, and at bedtime for 14 days. 42 Tablet 0    pantoprazole (PROTONIX) 40 MG Tablet Delayed Response Take 1 Tablet by mouth every day for 14 days. 14 Tablet 0    tetracycline (SUMYCIN) 500 MG Cap Take 1 Capsule by mouth every 6 hours for 14 days. 56 Capsule 0    triamcinolone acetonide (KENALOG) 0.1 % Cream Apply 1 Application topically 2 times a day. 45 g 0    albuterol 108 (90 Base) MCG/ACT Aero Soln inhalation aerosol INHALE 2 PUFFS BY MOUTH EVERY 4 HOURS AS NEEDED FOR SHORTNESS OF BREATH 9 g 4    pregabalin (LYRICA) 25 MG Cap Take 1 Capsule by mouth 2 times a day as needed (pain).      pregabalin (LYRICA) 150 MG Cap Take 150 mg by mouth at bedtime as needed (pain).      Semaglutide,0.25 or 0.5MG/DOS, (OZEMPIC, 0.25 OR 0.5 MG/DOSE,) 2 MG/3ML Solution Pen-injector Inject 0.5 mg under the skin every 7 days. 9 mL 2    metFORMIN ER (GLUCOPHAGE XR) 500 MG TABLET SR 24 HR Take 2 Tablets by mouth 2 times a day. 360 Tablet 2    pravastatin (PRAVACHOL) 40 MG tablet Take 1 Tablet by mouth every evening. 90 Tablet 2    vitamin D2, Ergocalciferol, (DRISDOL) 1.25 MG (68020 UT) Cap capsule Take 1 Capsule by mouth every 7 days. 12 Capsule 2    ondansetron  "(ZOFRAN ODT) 4 MG TABLET DISPERSIBLE Take 1 Tablet by mouth every 6 hours as needed for Nausea/Vomiting. 10 Tablet 0     No current Epic-ordered facility-administered medications on file.     Review of Systems   Constitutional:  Positive for malaise/fatigue. Negative for chills and fever.   Gastrointestinal:  Negative for abdominal pain, blood in stool, constipation, diarrhea, melena, nausea and vomiting.   All other systems reviewed and are negative.    Objective:     Exam:  /74 (BP Location: Left arm, Patient Position: Sitting, BP Cuff Size: Adult)   Pulse 86   Temp 37.2 °C (98.9 °F) (Temporal)   Resp 20   Ht 1.575 m (5' 2\")   Wt 62.6 kg (138 lb)   SpO2 98%   BMI 25.24 kg/m²  Body mass index is 25.24 kg/m².    Physical Exam  Constitutional:       Appearance: Normal appearance.   HENT:      Head: Normocephalic and atraumatic.   Pulmonary:      Effort: Pulmonary effort is normal. No respiratory distress.      Breath sounds: Normal breath sounds.   Neurological:      Mental Status: She is alert.   Psychiatric:         Mood and Affect: Mood normal.         Behavior: Behavior normal.         Thought Content: Thought content normal.         Judgment: Judgment normal.       Results:  Results  Lab Results   Component Value Date/Time    WBC 5.8 12/12/2024 01:44 AM    WBC 6.1 07/20/2010 09:00 AM    RBC 4.04 (L) 12/12/2024 01:44 AM    RBC 4.99 07/20/2010 09:00 AM    HEMOGLOBIN 7.3 (L) 12/12/2024 01:44 AM    HEMATOCRIT 26.5 (L) 12/12/2024 01:44 AM    MCV 65.6 (L) 12/12/2024 01:44 AM    MCV 90 07/20/2010 09:00 AM    MCH 18.1 (L) 12/12/2024 01:44 AM    MCH 28.5 07/20/2010 09:00 AM    MCHC 27.5 (L) 12/12/2024 01:44 AM    MPV 9.4 12/12/2024 01:44 AM      Imaging  Endoscopy showed no ulcers but indicated mild inflammation of the gastric lining.    Assessment & Plan:   Sybil  is a pleasant 49 y.o. female with the following -     Assessment & Plan  1. Iron deficiency anemia.  Her iron levels were significantly low " "during her hospital stay, necessitating an iron infusion. She is currently taking iron pills 325 mg once a day. A blood test will be conducted in approximately 2 weeks to reassess her blood count and iron levels. She is advised to continue her current regimen of iron pills 325 mg once a day. If her iron levels remain low, a referral for an iron infusion at the infusion center will be considered. She is also advised to consider taking \"gentle iron\" if she experiences side effects from her current iron pills.    2. H. pylori infection.  She tested positive for H. pylori and is currently on a 14-day course of antibiotics. She is advised to complete the full course of antibiotics. After finishing the antibiotics, she should stop taking pantoprazole for 2 weeks before undergoing a retest for H. pylori.    3.  Menorrhagia  She has a history of heavy menstrual periods, which may contribute to her iron deficiency anemia. She is advised to discuss potential treatments for heavy menstrual bleeding, such as ablation or hysterectomy, with her OB/GYN. She has an upcoming appointment with her OB/GYN to address this issue.      Problem List Items Addressed This Visit       Iron deficiency anemia    Relevant Orders    IRON/TOTAL IRON BIND    FERRITIN    RETICULOCYTES COUNT    CBC WITH DIFFERENTIAL    Menorrhagia     Other Visit Diagnoses       H. pylori infection        Relevant Orders    H. PYLORI BREATH TEST          Follow-up  The patient will follow up on 01/14/2025.    Please note that this dictation was created using voice recognition software. I have made every reasonable attempt to correct obvious errors, but I expect that there are errors of grammar and possibly content that I did not discover before finalizing the note.  "

## 2024-12-17 NOTE — PROCEDURE: ADDITIONAL NOTES
Additional Notes: Triamcinolone given to pt from PCP, advised pt to only use the triamcinalone x 2 weeks. Advised pt long term use of this topical is not recommended.
Render Risk Assessment In Note?: no
Detail Level: Simple

## 2024-12-17 NOTE — PROCEDURE: RECOMMENDATIONS
Render Risk Assessment In Note?: no
Detail Level: Zone
Recommendation Preamble: The following recommendations were made during the visit: OTC Promoxine and Capsaicin, 10s unit shock therapy.

## 2024-12-17 NOTE — HPI: EVALUATION OF SKIN LESION(S)
What Type Of Note Output Would You Prefer (Optional)?: Bullet Format
Have Your Spot(S) Been Treated In The Past?: has not been treated
Hpi Title: Evaluation of a Skin Lesion
Additional History: \\nPCP was prescribed triamcinalone, did not use

## 2025-01-02 ENCOUNTER — HOSPITAL ENCOUNTER (OUTPATIENT)
Dept: LAB | Facility: MEDICAL CENTER | Age: 50
End: 2025-01-02
Attending: INTERNAL MEDICINE
Payer: COMMERCIAL

## 2025-01-02 DIAGNOSIS — D50.9 IRON DEFICIENCY ANEMIA, UNSPECIFIED IRON DEFICIENCY ANEMIA TYPE: ICD-10-CM

## 2025-01-02 LAB
ANISOCYTOSIS BLD QL SMEAR: ABNORMAL
BASOPHILS # BLD AUTO: 1.6 % (ref 0–1.8)
BASOPHILS # BLD: 0.08 K/UL (ref 0–0.12)
BURR CELLS BLD QL SMEAR: NORMAL
COMMENT 1642: NORMAL
EOSINOPHIL # BLD AUTO: 0.16 K/UL (ref 0–0.51)
EOSINOPHIL NFR BLD: 3.2 % (ref 0–6.9)
ERYTHROCYTE [DISTWIDTH] IN BLOOD BY AUTOMATED COUNT: 78.1 FL (ref 35.9–50)
HCT VFR BLD AUTO: 42.1 % (ref 37–47)
HGB BLD-MCNC: 12.4 G/DL (ref 12–16)
HGB RETIC QN AUTO: 33.5 PG/CELL (ref 29–35)
HYPOCHROMIA BLD QL SMEAR: ABNORMAL
IMM GRANULOCYTES # BLD AUTO: 0.02 K/UL (ref 0–0.11)
IMM GRANULOCYTES NFR BLD AUTO: 0.4 % (ref 0–0.9)
IMM RETICS NFR: 6.2 % (ref 2.6–16.1)
LYMPHOCYTES # BLD AUTO: 1.56 K/UL (ref 1–4.8)
LYMPHOCYTES NFR BLD: 31.3 % (ref 22–41)
MCH RBC QN AUTO: 23 PG (ref 27–33)
MCHC RBC AUTO-ENTMCNC: 29.5 G/DL (ref 32.2–35.5)
MCV RBC AUTO: 78.1 FL (ref 81.4–97.8)
MICROCYTES BLD QL SMEAR: ABNORMAL
MONOCYTES # BLD AUTO: 0.43 K/UL (ref 0–0.85)
MONOCYTES NFR BLD AUTO: 8.6 % (ref 0–13.4)
MORPHOLOGY BLD-IMP: NORMAL
NEUTROPHILS # BLD AUTO: 2.74 K/UL (ref 1.82–7.42)
NEUTROPHILS NFR BLD: 54.9 % (ref 44–72)
NRBC # BLD AUTO: 0 K/UL
NRBC BLD-RTO: 0 /100 WBC (ref 0–0.2)
OVALOCYTES BLD QL SMEAR: NORMAL
PLATELET # BLD AUTO: 402 K/UL (ref 164–446)
PLATELET BLD QL SMEAR: NORMAL
PMV BLD AUTO: 10.1 FL (ref 9–12.9)
POIKILOCYTOSIS BLD QL SMEAR: NORMAL
RBC # BLD AUTO: 5.39 M/UL (ref 4.2–5.4)
RBC BLD AUTO: PRESENT
RETICS # AUTO: 0.03 M/UL (ref 0.04–0.12)
RETICS/RBC NFR: 0.6 % (ref 0.8–2.6)
WBC # BLD AUTO: 5 K/UL (ref 4.8–10.8)

## 2025-01-02 PROCEDURE — 83550 IRON BINDING TEST: CPT

## 2025-01-02 PROCEDURE — 36415 COLL VENOUS BLD VENIPUNCTURE: CPT

## 2025-01-02 PROCEDURE — 85046 RETICYTE/HGB CONCENTRATE: CPT

## 2025-01-02 PROCEDURE — 85025 COMPLETE CBC W/AUTO DIFF WBC: CPT

## 2025-01-02 PROCEDURE — 82728 ASSAY OF FERRITIN: CPT

## 2025-01-02 PROCEDURE — 83540 ASSAY OF IRON: CPT

## 2025-01-03 LAB
FERRITIN SERPL-MCNC: 128 NG/ML (ref 10–291)
IRON SATN MFR SERPL: 39 % (ref 15–55)
IRON SERPL-MCNC: 112 UG/DL (ref 40–170)
TIBC SERPL-MCNC: 290 UG/DL (ref 250–450)
UIBC SERPL-MCNC: 178 UG/DL (ref 110–370)

## 2025-01-07 ENCOUNTER — OFFICE VISIT (OUTPATIENT)
Dept: MEDICAL GROUP | Age: 50
End: 2025-01-07
Payer: COMMERCIAL

## 2025-01-07 ENCOUNTER — HOSPITAL ENCOUNTER (OUTPATIENT)
Facility: MEDICAL CENTER | Age: 50
End: 2025-01-07
Attending: OBSTETRICS & GYNECOLOGY
Payer: COMMERCIAL

## 2025-01-07 VITALS
RESPIRATION RATE: 20 BRPM | BODY MASS INDEX: 25.21 KG/M2 | HEART RATE: 80 BPM | DIASTOLIC BLOOD PRESSURE: 68 MMHG | SYSTOLIC BLOOD PRESSURE: 104 MMHG | OXYGEN SATURATION: 100 % | WEIGHT: 137 LBS | HEIGHT: 62 IN | TEMPERATURE: 98.2 F

## 2025-01-07 DIAGNOSIS — E11.9 CONTROLLED TYPE 2 DIABETES MELLITUS WITHOUT COMPLICATION, WITHOUT LONG-TERM CURRENT USE OF INSULIN (HCC): ICD-10-CM

## 2025-01-07 DIAGNOSIS — F32.A DEPRESSION, UNSPECIFIED DEPRESSION TYPE: ICD-10-CM

## 2025-01-07 DIAGNOSIS — F43.21 GRIEVING: ICD-10-CM

## 2025-01-07 LAB
ANISOCYTOSIS BLD QL SMEAR: ABNORMAL
BASOPHILS # BLD AUTO: 1.7 % (ref 0–1.8)
BASOPHILS # BLD: 0.08 K/UL (ref 0–0.12)
BURR CELLS BLD QL SMEAR: NORMAL
COMMENT 1642: NORMAL
EOSINOPHIL # BLD AUTO: 0.11 K/UL (ref 0–0.51)
EOSINOPHIL NFR BLD: 2.4 % (ref 0–6.9)
ERYTHROCYTE [DISTWIDTH] IN BLOOD BY AUTOMATED COUNT: 78.6 FL (ref 35.9–50)
FERRITIN SERPL-MCNC: 75.4 NG/ML (ref 10–291)
HCT VFR BLD AUTO: 41.6 % (ref 37–47)
HGB BLD-MCNC: 12.2 G/DL (ref 12–16)
HYPOCHROMIA BLD QL SMEAR: ABNORMAL
IMM GRANULOCYTES # BLD AUTO: 0.01 K/UL (ref 0–0.11)
IMM GRANULOCYTES NFR BLD AUTO: 0.2 % (ref 0–0.9)
LYMPHOCYTES # BLD AUTO: 1.9 K/UL (ref 1–4.8)
LYMPHOCYTES NFR BLD: 40.9 % (ref 22–41)
MCH RBC QN AUTO: 23.1 PG (ref 27–33)
MCHC RBC AUTO-ENTMCNC: 29.3 G/DL (ref 32.2–35.5)
MCV RBC AUTO: 78.8 FL (ref 81.4–97.8)
MICROCYTES BLD QL SMEAR: ABNORMAL
MONOCYTES # BLD AUTO: 0.39 K/UL (ref 0–0.85)
MONOCYTES NFR BLD AUTO: 8.4 % (ref 0–13.4)
MORPHOLOGY BLD-IMP: NORMAL
NEUTROPHILS # BLD AUTO: 2.15 K/UL (ref 1.82–7.42)
NEUTROPHILS NFR BLD: 46.4 % (ref 44–72)
NRBC # BLD AUTO: 0 K/UL
NRBC BLD-RTO: 0 /100 WBC (ref 0–0.2)
OVALOCYTES BLD QL SMEAR: NORMAL
PLATELET # BLD AUTO: 400 K/UL (ref 164–446)
PLATELET BLD QL SMEAR: NORMAL
PMV BLD AUTO: 10.1 FL (ref 9–12.9)
POIKILOCYTOSIS BLD QL SMEAR: NORMAL
RBC # BLD AUTO: 5.28 M/UL (ref 4.2–5.4)
RBC BLD AUTO: PRESENT
WBC # BLD AUTO: 4.6 K/UL (ref 4.8–10.8)

## 2025-01-07 PROCEDURE — 85025 COMPLETE CBC W/AUTO DIFF WBC: CPT

## 2025-01-07 PROCEDURE — 99214 OFFICE O/P EST MOD 30 MIN: CPT | Performed by: INTERNAL MEDICINE

## 2025-01-07 PROCEDURE — 3074F SYST BP LT 130 MM HG: CPT | Performed by: INTERNAL MEDICINE

## 2025-01-07 PROCEDURE — 82728 ASSAY OF FERRITIN: CPT

## 2025-01-07 PROCEDURE — 92250 FUNDUS PHOTOGRAPHY W/I&R: CPT | Mod: 26 | Performed by: INTERNAL MEDICINE

## 2025-01-07 PROCEDURE — 3078F DIAST BP <80 MM HG: CPT | Performed by: INTERNAL MEDICINE

## 2025-01-07 RX ORDER — ESCITALOPRAM OXALATE 10 MG/1
TABLET ORAL
Qty: 83 TABLET | Refills: 1 | Status: SHIPPED | OUTPATIENT
Start: 2025-01-07 | End: 2025-04-07

## 2025-01-07 ASSESSMENT — ENCOUNTER SYMPTOMS
DIARRHEA: 0
FEVER: 0
CONSTIPATION: 0
NAUSEA: 0
VOMITING: 0
CHILLS: 0
BLOOD IN STOOL: 0
ABDOMINAL PAIN: 0

## 2025-01-07 ASSESSMENT — PATIENT HEALTH QUESTIONNAIRE - PHQ9
CLINICAL INTERPRETATION OF PHQ2 SCORE: 1
5. POOR APPETITE OR OVEREATING: 0 - NOT AT ALL
SUM OF ALL RESPONSES TO PHQ QUESTIONS 1-9: 5

## 2025-01-07 ASSESSMENT — FIBROSIS 4 INDEX: FIB4 SCORE: 0.62

## 2025-01-07 NOTE — PROGRESS NOTES
CC:   Chief Complaint   Patient presents with    Lab Results    Follow-Up     Diagnoses of Grieving, Depression, unspecified depression type, and Controlled type 2 diabetes mellitus without complication, without long-term current use of insulin ([facility]) were pertinent to this visit.  Verbal consent was acquired by the patient to use Submittable ambient listening note generation during this visit Yes     History of Present Illness  Sybil is a pleasant 49 y.o. female who presents today to discuss the following problems:    She was seen in the emergency room for iron deficiency anemia and received an iron infusion while in the hospital. A repeat of her iron levels shows significant improvement.    She reports a slight improvement in her condition but continues to experience occasional stomach discomfort. She has not yet undergone her second H. pylori test. She completed her antibiotic course and was advised to discontinue omeprazole for 2 weeks. She experiences mild discomfort after meals, which is less severe than before.    She had a consultation with her gynecologist this morning, during which an ultrasound and possibly a biopsy were ordered to rule out any underlying issues.    She has been traveling back and forth to [place] to care for her father, who recently passed away. She is seeking assistance to cope with the grieving process. She experiences emotional distress throughout the day and has difficulty sleeping at night. She has never been prescribed antidepressants before. She takes Lyrica for pain management, which also aids in her sleep. She has not previously engaged in counseling or therapy.    She continues to see Dr. Allen for her diabetes management, with the last visit occurring in either September or October.     Past Medical History:   Diagnosis Date    Allergy     Anxiety     Lorazepam rarely    ASTHMA 9/3/2010    COVID-19 6/27/2022    Date of symptoms onset: 6/15/2022  Symptoms: Headache,  fatigue, runny nose, sinusitis, cough productive with small amount of clear sputum. Fever: no fever  Date of COVID positive test: 06/15/2022, 6/24/2022  Medications tried: Dayquil, cold and flue OTC medicine, Tylenol  Home max temperature: normal  Home O2 saturation: has been able to tolerate PO but with reduced appetite. Normal urination.  CO    Depression 2008    no antidepressant    Diabetes mellitus type II 9/3/2010    Elevated liver enzymes 2009    GERD (gastroesophageal reflux disease)     occasional    Hyperlipidemia     Multiple thyroid nodules 3/24/2023    Thyroid nodule 3/8/2023    Urinary tract infection, site not specified     Vitamin D deficiency 2009       Current Outpatient Medications Ordered in Epic   Medication Sig Dispense Refill    escitalopram (LEXAPRO) 10 MG Tab Take 0.5 Tablets by mouth every day for 14 days, THEN 1 Tablet every day for 76 days. 83 Tablet 1    ferrous sulfate 325 (65 Fe) MG EC tablet Take 1 Tablet by mouth every day for 30 days. 30 Tablet 0    triamcinolone acetonide (KENALOG) 0.1 % Cream Apply 1 Application topically 2 times a day. 45 g 0    albuterol 108 (90 Base) MCG/ACT Aero Soln inhalation aerosol INHALE 2 PUFFS BY MOUTH EVERY 4 HOURS AS NEEDED FOR SHORTNESS OF BREATH 9 g 4    pregabalin (LYRICA) 25 MG Cap Take 1 Capsule by mouth 2 times a day as needed (pain).      pregabalin (LYRICA) 150 MG Cap Take 150 mg by mouth at bedtime as needed (pain).      Semaglutide,0.25 or 0.5MG/DOS, (OZEMPIC, 0.25 OR 0.5 MG/DOSE,) 2 MG/3ML Solution Pen-injector Inject 0.5 mg under the skin every 7 days. 9 mL 2    metFORMIN ER (GLUCOPHAGE XR) 500 MG TABLET SR 24 HR Take 2 Tablets by mouth 2 times a day. 360 Tablet 2    pravastatin (PRAVACHOL) 40 MG tablet Take 1 Tablet by mouth every evening. 90 Tablet 2    vitamin D2, Ergocalciferol, (DRISDOL) 1.25 MG (03526 UT) Cap capsule Take 1 Capsule by mouth every 7 days. 12 Capsule 2    ondansetron (ZOFRAN ODT) 4 MG TABLET DISPERSIBLE Take 1 Tablet  "by mouth every 6 hours as needed for Nausea/Vomiting. 10 Tablet 0     No current Epic-ordered facility-administered medications on file.     Review of Systems   Constitutional:  Positive for malaise/fatigue. Negative for chills and fever.   Gastrointestinal:  Negative for abdominal pain, blood in stool, constipation, diarrhea, melena, nausea and vomiting.   All other systems reviewed and are negative.    Objective:     Exam:  /68 (BP Location: Right arm, Patient Position: Sitting, BP Cuff Size: Adult)   Pulse 80   Temp 36.8 °C (98.2 °F) (Temporal)   Resp 20   Ht 1.575 m (5' 2\")   Wt 62.1 kg (137 lb)   SpO2 100%   BMI 25.06 kg/m²  Body mass index is 25.06 kg/m².    Physical Exam  Constitutional:       Appearance: Normal appearance.   HENT:      Head: Normocephalic and atraumatic.   Pulmonary:      Effort: Pulmonary effort is normal. No respiratory distress.      Breath sounds: Normal breath sounds.   Neurological:      Mental Status: She is alert.   Psychiatric:         Attention and Perception: Attention and perception normal.         Mood and Affect: Mood normal. Affect is tearful.         Speech: Speech normal.         Behavior: Behavior normal. Behavior is cooperative.         Thought Content: Thought content normal.         Judgment: Judgment normal.       Results:   Latest Reference Range & Units 01/02/25 10:31   WBC 4.8 - 10.8 K/uL 5.0   RBC 4.20 - 5.40 M/uL 5.39   Hemoglobin 12.0 - 16.0 g/dL 12.4   Hematocrit 37.0 - 47.0 % 42.1   MCV 81.4 - 97.8 fL 78.1 (L)   MCH 27.0 - 33.0 pg 23.0 (L)   MCHC 32.2 - 35.5 g/dL 29.5 (L)   RDW 35.9 - 50.0 fL 78.1 (H)   Platelet Count 164 - 446 K/uL 402   MPV 9.0 - 12.9 fL 10.1   Neutrophils-Polys 44.00 - 72.00 % 54.90   Neutrophils (Absolute) 1.82 - 7.42 K/uL 2.74   Lymphocytes 22.00 - 41.00 % 31.30   Lymphs (Absolute) 1.00 - 4.80 K/uL 1.56   Monocytes 0.00 - 13.40 % 8.60   Monos (Absolute) 0.00 - 0.85 K/uL 0.43   Eosinophils 0.00 - 6.90 % 3.20   Eos (Absolute) " 0.00 - 0.51 K/uL 0.16   Basophils 0.00 - 1.80 % 1.60   Baso (Absolute) 0.00 - 0.12 K/uL 0.08   Immature Granulocytes 0.00 - 0.90 % 0.40   Immature Granulocytes (abs) 0.00 - 0.11 K/uL 0.02   Nucleated RBC 0.00 - 0.20 /100 WBC 0.00   NRBC (Absolute) K/uL 0.00   Plt Estimation  Normal   RBC Morphology  Present   Hypochromia  1+   Anisocytosis  1+   Microcytosis  1+   Poikilocytosis  1+   Ovalocytes  1+   Echinocytes  1+   Comments-Diff  see below   Peripheral Smear Review  see below   Reticulocyte Count 0.8 - 2.6 % 0.6 (L)   Retic, Absolute 0.04 - 0.12 M/uL 0.03 (L)   Imm. Reticulocyte Fraction 2.6 - 16.1 % 6.2   Retic Hgb Equivalent 29.0 - 35.0 pg/cell 33.5   (L): Data is abnormally low  (H): Data is abnormally high  Results  Laboratory Studies  Iron levels, ferritin, and blood counts have improved significantly.    Assessment & Plan:   Sybil  is a pleasant 49 y.o. female with the following -   Assessment & Plan  1. Iron deficiency anemia.  Her iron levels, ferritin, and blood counts have shown significant improvement. She is advised to continue her iron supplement regimen.    2. Type 2 diabetes mellitus.  An eye exam will be conducted today to check the back of her eyes, as it is essential for diabetes management. Her A1c levels will be assessed during her next visit.    3. Grieving  Lexapro (escitalopram) has been prescribed, starting with half a tablet daily for 2 weeks, then increasing to a full tablet daily. It is expected to take about 4 to 5 weeks to feel the full effect. She is advised to take the medication in the morning to avoid interaction with Lyrica, which she takes at night for pain. A referral for counseling has been discussed, but she prefers to start with medication first.    Problem List Items Addressed This Visit       Controlled type 2 diabetes mellitus without complication, without long-term current use of insulin ([facility]) (Chronic)    Relevant Orders    POCT Retinal Eye Exam     Other Visit  Diagnoses       Grieving        Depression, unspecified depression type        Relevant Medications    escitalopram (LEXAPRO) 10 MG Tab          No follow-ups on file. 02/10/2025     Please note that this dictation was created using voice recognition software. I have made every reasonable attempt to correct obvious errors, but I expect that there are errors of grammar and possibly content that I did not discover before finalizing the note.

## 2025-01-08 LAB — RETINAL SCREEN: NEGATIVE

## 2025-01-18 ENCOUNTER — HOSPITAL ENCOUNTER (OUTPATIENT)
Dept: LAB | Facility: MEDICAL CENTER | Age: 50
End: 2025-01-18
Attending: INTERNAL MEDICINE
Payer: COMMERCIAL

## 2025-01-18 LAB — UREA BREATH TEST QL: NEGATIVE

## 2025-01-18 PROCEDURE — 83013 H PYLORI (C-13) BREATH: CPT

## 2025-01-31 ENCOUNTER — PATIENT MESSAGE (OUTPATIENT)
Dept: MEDICAL GROUP | Age: 50
End: 2025-01-31
Payer: COMMERCIAL

## 2025-01-31 DIAGNOSIS — D50.9 IRON DEFICIENCY ANEMIA, UNSPECIFIED IRON DEFICIENCY ANEMIA TYPE: ICD-10-CM

## 2025-02-05 NOTE — PROGRESS NOTES
Problem List Items Addressed This Visit       Iron deficiency anemia    Relevant Orders    CBC WITH DIFFERENTIAL    IRON/TOTAL IRON BIND    FERRITIN        Speech Language Pathology  [facility]    Cognitive Treatment Note    Date: 2024  Patient’s Name: Jagdeep Quintana Sr.  MRN: [id number]  Patient Active Problem List   Diagnosis Code    Uncontrolled hypertension I10    Chronic shoulder pain M25.519, G89.29    Acute low back pain M54.50    Cervical pain M54.2    Neck sprain S13.9XXA    Lumbar sprain S33.5XXA    DDD (degenerative disc disease), cervical M50.30    DDD (degenerative disc disease), lumbar M51.36    Smoker F17.200    Acquired spondylolisthesis M43.10    Intraparenchymal hemorrhage of brain (HCC) I61.9    Intracranial hemorrhage (HCC) I62.9    Hyponatremia E87.1    Alcohol withdrawal syndrome, with delirium (HCC) F10.931    Polysubstance abuse (HCC) F19.10    ETOH abuse F10.10    Vertebral artery occlusion, 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 Cognitive evaluation completed on this date. Results are as follows.     Recall: Immediate recall: 3/3, 5/5, 3/3  Short-term recall of 3-units: 0/3 increased to 2/3 with min verbal cues, 0/3 increased to 3/3 with min-mod verbal cues    Organization: Word associations: 2/5 increased to 5/5 with min verbal cues and repetition  Sequencin/4   Categorization (divergent): 5/8 in 30 seconds, increased to 8/8 with min verbal cues and additional time    Problem Solving/Reasoning: Antonyms: 4/4  Inductive reasoning: 3/4 increased to 4/4 with min verbal cues  Similarities/Differences: 4/4  Task insight: 3/3  Thought flexibility: 4/6 increased to 5/6 with min verbal cues  Convergent thinkin/3 increased to 1/3 with mod verbal cues  Deductive reasoning: 3/4, did not increase with max verbal cues    New goals are as follows:  1) Pt will recall 3-5  units with distractions with 90% accuracy.  2) Pt will utilize memory compensatory strategies to aid in recall.  3) Pt will complete verbal reasoning tasks with 90% accuracy.  Pt will complete abstract reasoning/deductive reasoning tasks with 90% accuracy.    Facial Exercises: Pt. Seen for O/M treatment program for dysarthria.  Pt. Completed O/M exercises  X5 sets with minimal verbal cues. Note right-side weakness during labial retraction.  Education provided re: compensatory strategies to increase speech intelligibility/clarity.  Pt. Verbalized understanding.     Plan:  [x] Continue ST services    [] Discharge from ST:      Discharge recommendations: []  Further therapy recommended at discharge.The patient should be able to tolerate at least 3 hours of therapy per day over 5 days or 15 hours over 7 days. [x] Further therapy recommended at discharge.   [] No therapy recommended at discharge.        Completed by: Vanessa Alvares  Clinician    Cosigned By: Beatrice Schroeder M.A.CCC/SLP

## 2025-02-10 ENCOUNTER — APPOINTMENT (OUTPATIENT)
Dept: MEDICAL GROUP | Age: 50
End: 2025-02-10
Payer: COMMERCIAL

## 2025-04-02 ENCOUNTER — APPOINTMENT (OUTPATIENT)
Dept: MEDICAL GROUP | Facility: PHYSICIAN GROUP | Age: 50
End: 2025-04-02
Payer: COMMERCIAL

## 2025-04-02 ENCOUNTER — OFFICE VISIT (OUTPATIENT)
Dept: URGENT CARE | Facility: PHYSICIAN GROUP | Age: 50
End: 2025-04-02
Payer: COMMERCIAL

## 2025-04-02 VITALS
HEIGHT: 62 IN | DIASTOLIC BLOOD PRESSURE: 68 MMHG | SYSTOLIC BLOOD PRESSURE: 112 MMHG | OXYGEN SATURATION: 98 % | WEIGHT: 135.91 LBS | BODY MASS INDEX: 25.01 KG/M2 | RESPIRATION RATE: 14 BRPM | HEART RATE: 85 BPM | TEMPERATURE: 97.5 F

## 2025-04-02 DIAGNOSIS — B96.89 ACUTE BACTERIAL SINUSITIS: ICD-10-CM

## 2025-04-02 DIAGNOSIS — J01.90 ACUTE BACTERIAL SINUSITIS: ICD-10-CM

## 2025-04-02 PROCEDURE — 99213 OFFICE O/P EST LOW 20 MIN: CPT | Performed by: PHYSICIAN ASSISTANT

## 2025-04-02 PROCEDURE — 3078F DIAST BP <80 MM HG: CPT | Performed by: PHYSICIAN ASSISTANT

## 2025-04-02 PROCEDURE — 3074F SYST BP LT 130 MM HG: CPT | Performed by: PHYSICIAN ASSISTANT

## 2025-04-02 ASSESSMENT — ENCOUNTER SYMPTOMS
SORE THROAT: 0
COUGH: 0
DIZZINESS: 0
NAUSEA: 0
MYALGIAS: 0
VOMITING: 0
SHORTNESS OF BREATH: 0
CHILLS: 0
FEVER: 0
SPUTUM PRODUCTION: 0
PALPITATIONS: 0
DIAPHORESIS: 0
HEADACHES: 0
ABDOMINAL PAIN: 0
SINUS PAIN: 1
DIARRHEA: 0
WHEEZING: 0

## 2025-04-02 ASSESSMENT — FIBROSIS 4 INDEX: FIB4 SCORE: .625

## 2025-04-02 NOTE — PROGRESS NOTES
Subjective:     CHIEF COMPLAINT  Chief Complaint   Patient presents with    Nasal Congestion     Nasal congestion,runny nose, light yellow-green mucus x 1 month     Ear Fullness     Right ear fullness x 1 week        HPI  Sybil Manuel is a very pleasant 49 y.o. female who presents to the clinic with sinus congestion and pressure that has been ongoing for the last month.  Rhinorrhea is thick and yellow/green in color.  Describes pressure over the maxillary sinuses.  Over the last week she has been experiencing pressure and fullness to the right ear.  No ear discharge or drainage.  Denies any associated fevers, chills or myalgias.  No cough.  Has been taking antihistamines and recently started Flonase without any significant improvement.    REVIEW OF SYSTEMS  Review of Systems   Constitutional:  Negative for chills, diaphoresis, fever and malaise/fatigue.   HENT:  Positive for congestion, ear pain and sinus pain. Negative for sore throat.    Respiratory:  Negative for cough, sputum production, shortness of breath and wheezing.    Cardiovascular:  Negative for chest pain and palpitations.   Gastrointestinal:  Negative for abdominal pain, diarrhea, nausea and vomiting.   Musculoskeletal:  Negative for myalgias.   Neurological:  Negative for dizziness and headaches.   Endo/Heme/Allergies:  Positive for environmental allergies.       PAST MEDICAL HISTORY  Patient Active Problem List    Diagnosis Date Noted    Menorrhagia 12/17/2024    Iron deficiency anemia 12/11/2024    GI bleed 12/10/2024    H pylori ulcer 12/10/2024    Acute blood loss anemia 12/10/2024    Nausea 12/05/2024    Acute pharyngitis due to other specified organisms 01/08/2024    Upper respiratory disease 01/08/2024    Pain from implanted hardware 08/01/2023    Dyslipidemia 03/24/2023    Multiple thyroid nodules 03/24/2023    Closed fracture of right distal radius 01/19/2023    Controlled type 2 diabetes mellitus without complication, without long-term  current use of insulin (HCC) 09/22/2022    COVID-19 06/27/2022    Microalbuminuria 03/25/2022    Bilateral calf pain 12/01/2021    Morning headache 11/29/2021    Long term (current) use of oral hypoglycemic drugs 09/24/2021    Palpitations 04/16/2021    Hyperlipidemia associated with type 2 diabetes mellitus (HCC) 07/13/2018    Elevated liver enzymes 07/13/2018    Seasonal allergies 07/15/2016    Acne 07/21/2011    Vitamin D deficiency 09/03/2010       SURGICAL HISTORY   has a past surgical history that includes primary c section; tubal coagulation laparoscopic bilateral; orif, wrist (Right, 1/27/2023); upper gi endoscopy,diagnosis (N/A, 12/11/2024); and upper gi endoscopy,biopsy (N/A, 12/11/2024).    ALLERGIES  Allergies   Allergen Reactions    Bloodless        CURRENT MEDICATIONS  Home Medications       Reviewed by Tyrone Torres P.A.-C. (Physician Assistant) on 04/02/25 at 1219  Med List Status: <None>     Medication Last Dose Status   albuterol 108 (90 Base) MCG/ACT Aero Soln inhalation aerosol PRN Active   escitalopram (LEXAPRO) 10 MG Tab Taking Active   metFORMIN ER (GLUCOPHAGE XR) 500 MG TABLET SR 24 HR Taking Active   ondansetron (ZOFRAN ODT) 4 MG TABLET DISPERSIBLE PRN Active   ondansetron (ZOFRAN ODT) 4 MG TABLET DISPERSIBLE PRN Active   oxyCODONE immediate-release (ROXICODONE) 5 MG Tab PRN Active   oxyCODONE immediate-release (ROXICODONE) 5 MG Tab PRN Active   pravastatin (PRAVACHOL) 40 MG tablet Taking Active   pregabalin (LYRICA) 150 MG Cap Taking Active   pregabalin (LYRICA) 150 MG Cap Taking Active   pregabalin (LYRICA) 25 MG Cap Taking Active   pregabalin (LYRICA) 25 MG Cap Taking Active   Semaglutide,0.25 or 0.5MG/DOS, (OZEMPIC, 0.25 OR 0.5 MG/DOSE,) 2 MG/3ML Solution Pen-injector Taking Active   triamcinolone acetonide (KENALOG) 0.1 % Cream Not Taking Active   vitamin D2, Ergocalciferol, (DRISDOL) 1.25 MG (58894 UT) Cap capsule Not Taking Active                    SOCIAL HISTORY  Social History  "    Tobacco Use    Smoking status: Never    Smokeless tobacco: Never   Vaping Use    Vaping status: Never Used   Substance and Sexual Activity    Alcohol use: Not Currently     Comment: 4 per year    Drug use: Never    Sexual activity: Yes     Partners: Male     Birth control/protection: Surgical       FAMILY HISTORY  Family History   Problem Relation Age of Onset    Heart Disease Mother         67 yo stents, pacemaker x2    Hypertension Mother     Hyperlipidemia Mother     Arrythmia Mother     Diabetes Brother     Hyperlipidemia Brother     Diabetes Maternal Grandmother     Cancer Neg Hx           Objective:     VITAL SIGNS: /68   Pulse 85   Temp 36.4 °C (97.5 °F) (Temporal)   Resp 14   Ht 1.575 m (5' 2\")   Wt 61.7 kg (135 lb 14.6 oz)   SpO2 98%   BMI 24.86 kg/m²     PHYSICAL EXAM  Physical Exam  Constitutional:       General: She is not in acute distress.     Appearance: Normal appearance. She is not ill-appearing, toxic-appearing or diaphoretic.   HENT:      Head: Normocephalic and atraumatic.      Right Ear: Ear canal and external ear normal.      Left Ear: Ear canal and external ear normal.      Ears:      Comments: Bilateral serous middle ear effusion     Nose: Congestion and rhinorrhea present.      Mouth/Throat:      Mouth: Mucous membranes are moist.      Pharynx: No oropharyngeal exudate or posterior oropharyngeal erythema.   Eyes:      Conjunctiva/sclera: Conjunctivae normal.   Cardiovascular:      Rate and Rhythm: Normal rate and regular rhythm.      Pulses: Normal pulses.      Heart sounds: Normal heart sounds.   Pulmonary:      Effort: Pulmonary effort is normal.      Breath sounds: Normal breath sounds. No wheezing, rhonchi or rales.   Musculoskeletal:      Cervical back: Normal range of motion. No muscular tenderness.   Lymphadenopathy:      Cervical: No cervical adenopathy.   Skin:     General: Skin is warm and dry.      Capillary Refill: Capillary refill takes less than 2 seconds. "   Neurological:      Mental Status: She is alert.   Psychiatric:         Mood and Affect: Mood normal.         Thought Content: Thought content normal.         Assessment/Plan:     1. Acute bacterial sinusitis  - amoxicillin-clavulanate (AUGMENTIN) 875-125 MG Tab; Take 1 Tablet by mouth 2 times a day for 7 days.  Dispense: 14 Tablet; Refill: 0      MDM/Comments:    -Nasal spray and allergy medications as directed (Zyrtec or Loratadine).  -You may try saline irrigation or neti pot.   -Drink plenty of fluids.  -Ibuprofen or Tylenol as directed for pain.   -Warm compress to sinuses.      Follow up with primary care provider. Urgently for worsening symptoms, persistent fevers, facial swelling, visual changes, weakness, elevated heart rate, stiff neck, symptoms last longer than 10 days, or any other concerns.    Differential diagnosis, natural history, supportive care, and indications for immediate follow-up discussed. All questions answered. Patient agrees with the plan of care.    Follow-up as needed if symptoms worsen or fail to improve to PCP, Urgent care or Emergency Room.    I have personally reviewed prior external notes and test results pertinent to today's visit.  I have independently reviewed and interpreted all diagnostics ordered during this urgent care acute visit.   Discussed management options (risks,benefits, and alternatives to treatment). Pt expresses understanding and the treatment plan was agreed upon. Questions were encouraged and answered to pt's satisfaction.    Please note that this dictation was created using voice recognition software. I have made a reasonable attempt to correct obvious errors, but I expect that there are errors of grammar and possibly content that I did not discover before finalizing the note.

## 2025-04-07 PROCEDURE — RXMED WILLOW AMBULATORY MEDICATION CHARGE: Performed by: ANESTHESIOLOGY

## 2025-04-14 ENCOUNTER — PHARMACY VISIT (OUTPATIENT)
Dept: PHARMACY | Facility: MEDICAL CENTER | Age: 50
End: 2025-04-14
Payer: MEDICARE

## 2025-04-14 PROCEDURE — RXMED WILLOW AMBULATORY MEDICATION CHARGE: Performed by: ANESTHESIOLOGY

## 2025-04-23 ENCOUNTER — TELEPHONE (OUTPATIENT)
Dept: ENDOCRINOLOGY | Facility: MEDICAL CENTER | Age: 50
End: 2025-04-23
Payer: COMMERCIAL

## 2025-04-23 NOTE — TELEPHONE ENCOUNTER
"Received Refill PA request via  Right[employer]ax for Ozempic 0.25mg/0.5mg/Dose (2mg/3mL) Sopn. (Quantity:3mL, Day Supply:28)  Insurance: [employer]  Member ID: [id number]  BIN: [id number]  PCN: [id number]  Group: [id number]   Ran Test claim via Reading & medication Rejects stating prior authorization is required.  Prior Authorization has been submitted via Cover My Meds: Key ([id number])    I received the following message through [facility]: \"Your PA request cannot be processed for the member plan submitted. For further inquiries please contact the number on the back of the member prescription card.\" I attempted to complete the PA request that was sent in by [facility] ([facility] Key: [id number]), which was under different insurance information:  Insurance: [place] [employer]  Member ID: [id number]  BIN: [id number]  PCN: [id number]  Group: [id number]     Prior Authorization has been submitted via Cover My Meds: Key ([id number])  Will follow up in 24-48 business hours.     Thank you,  Melissa Kraus, [facility]  Endocrinology Pharmacy Coordinator          " Renny Hopper MD

## 2025-04-24 NOTE — TELEPHONE ENCOUNTER
Prior Authorization for Ozempic 0.25mg/0.5mg/Dose (2mg/3mL) Sopn has been DENIED  Prior authorization was denied per the following: Did not meet criteria-  Documentation confirming a diagnosis of Type 2 Diabetes Mellitus by history of one of the following: Hemoglobin A1C greater than or equal to 6.5 percent; Fasting plasma glucose greater than or equal to 126mg/dL; two hour plasma glucose greater than or equal to 200mg/dL as part of an oral glucose tolerance test; or symptoms of hyperglycemia and a random plasma glucose greater than or equal to 200mg/dL.    Prior Authorization denial reference number: [id number]  Next Steps: I called patient @ [phone] to advise her of denial. There was no answer and I left a general voice message.     Thank you,  Melissa Kraus, [facility]  Endocrinology Pharmacy Coordinator

## 2025-05-02 ENCOUNTER — TELEPHONE (OUTPATIENT)
Dept: ENDOCRINOLOGY | Facility: MEDICAL CENTER | Age: 50
End: 2025-05-02
Payer: COMMERCIAL

## 2025-05-02 DIAGNOSIS — E78.5 DYSLIPIDEMIA: ICD-10-CM

## 2025-05-02 DIAGNOSIS — E11.9 CONTROLLED TYPE 2 DIABETES MELLITUS WITHOUT COMPLICATION, WITHOUT LONG-TERM CURRENT USE OF INSULIN (HCC): ICD-10-CM

## 2025-05-02 DIAGNOSIS — E55.9 VITAMIN D DEFICIENCY: ICD-10-CM

## 2025-05-02 DIAGNOSIS — Z79.84 LONG TERM (CURRENT) USE OF ORAL HYPOGLYCEMIC DRUGS: ICD-10-CM

## 2025-05-02 DIAGNOSIS — E04.2 MULTIPLE THYROID NODULES: ICD-10-CM

## 2025-05-05 ENCOUNTER — OFFICE VISIT (OUTPATIENT)
Dept: ENDOCRINOLOGY | Facility: MEDICAL CENTER | Age: 50
End: 2025-05-05
Attending: INTERNAL MEDICINE
Payer: COMMERCIAL

## 2025-05-05 ENCOUNTER — TELEPHONE (OUTPATIENT)
Dept: ENDOCRINOLOGY | Facility: MEDICAL CENTER | Age: 50
End: 2025-05-05

## 2025-05-05 VITALS
HEIGHT: 62 IN | WEIGHT: 135 LBS | OXYGEN SATURATION: 96 % | DIASTOLIC BLOOD PRESSURE: 70 MMHG | SYSTOLIC BLOOD PRESSURE: 110 MMHG | HEART RATE: 92 BPM | BODY MASS INDEX: 24.84 KG/M2

## 2025-05-05 DIAGNOSIS — Z79.84 LONG TERM (CURRENT) USE OF ORAL HYPOGLYCEMIC DRUGS: ICD-10-CM

## 2025-05-05 DIAGNOSIS — E78.5 DYSLIPIDEMIA: ICD-10-CM

## 2025-05-05 DIAGNOSIS — E55.9 VITAMIN D DEFICIENCY: ICD-10-CM

## 2025-05-05 DIAGNOSIS — E04.2 MULTIPLE THYROID NODULES: ICD-10-CM

## 2025-05-05 DIAGNOSIS — E11.9 CONTROLLED TYPE 2 DIABETES MELLITUS WITHOUT COMPLICATION, WITHOUT LONG-TERM CURRENT USE OF INSULIN (HCC): ICD-10-CM

## 2025-05-05 LAB
HBA1C MFR BLD: 5.5 % (ref ?–5.8)
POCT INT CON NEG: NEGATIVE
POCT INT CON POS: POSITIVE

## 2025-05-05 PROCEDURE — 83036 HEMOGLOBIN GLYCOSYLATED A1C: CPT | Performed by: INTERNAL MEDICINE

## 2025-05-05 PROCEDURE — 3078F DIAST BP <80 MM HG: CPT | Performed by: INTERNAL MEDICINE

## 2025-05-05 PROCEDURE — 3074F SYST BP LT 130 MM HG: CPT | Performed by: INTERNAL MEDICINE

## 2025-05-05 PROCEDURE — 99212 OFFICE O/P EST SF 10 MIN: CPT | Performed by: INTERNAL MEDICINE

## 2025-05-05 PROCEDURE — 99214 OFFICE O/P EST MOD 30 MIN: CPT | Performed by: INTERNAL MEDICINE

## 2025-05-05 ASSESSMENT — FIBROSIS 4 INDEX: FIB4 SCORE: .6375

## 2025-05-05 NOTE — Clinical Note
REFERRAL APPROVAL NOTICE         Sent on May 5, 2025                   Sybil Manuel  [address] [facility] [facility] [address]                   Dear Ms. Manuel,    After a careful review of the medical information and benefit coverage, [facility] has processed your referral. See below for additional details.    If applicable, you must be actively enrolled with your insurance for coverage of the authorized service. If you have any questions regarding your coverage, please contact your insurance directly.    REFERRAL INFORMATION   Referral #:  [id number]  Referred-To Department    Referred-By Provider:  Chelsey Ragsdale M.D.   [facility]      [address]  [address]  [facility] [id number] [phone] [address] [facility] [address] [phone]    Referral Start Date:  05/05/2025  Referral End Date:   05/05/2026             SCHEDULING  If you do not already have an appointment, please call [phone] to make an appointment.     MORE INFORMATION  If you do not already have a [facility] account, sign up at: [email or web address]  You can access your medical information, make appointments, see lab results, billing information, and more.  If you have questions regarding this referral, please contact  the [facility] Referrals department at:             [phone]. Monday - Friday 8:00AM - 5:00PM.     Sincerely,    [facility]

## 2025-05-05 NOTE — PROGRESS NOTES
CHIEF COMPLAINT: Patient is here for follow up of Type 2 Diabetes Mellitus    HPI:     Sybil Manuel is a 50 y.o. female with Type 2 Diabetes Mellitus here for follow up.      Labs from 5/5/2025 show A1c is 5.5%  Labs from 9/11/2024 show A1c is 6.3%  Labs from 8/22/2023 show a1c is 5.8%  Labs from 1/23/2023 show A1c is 5.9%  Labs from 3/25/2022 show A1c was 4.9%      Her baseline A1c was 10.9% on  4/2018   Latest Reference Range & Units 04/27/18 07:37 04/18/19 10:23 09/13/19 08:43 04/13/20 11:21 11/02/20 10:25 04/08/21 06:31   Glycohemoglobin 4.0 - 5.6 % 10.9 (H) 9.0 (H) 7.1 (H) 7.1 (H) 7.6 (H) 8.2 (H)     She started Ozempic in 2021 as she was still uncontrolled in 2021 with Metformin monotherapy.  Her baseline A1c was 10.9% on April 27, 2018 she was started on metformin and after more than 3 months of metformin therapy she was still uncontrolled her A1c was 8.2% on April 8, 2021 thus Ozempic was added to metformin as a combination therapy  She denies side effects with Ozempic          She is currently on   Metformin extended release 1000 mg twice a day   Ozempic 0.5 mg weekly  - out of med     She denies any side effects with her medications  Her insurance stopped covering Ozempic as it needs a PA  She took her last dose of Ozempic last week      She previously had albuminuria but she was acutely ill with flu   Albuminuria resolved on retesting    Latest Reference Range & Units 10/02/24 07:47   Micro Alb Creat Ratio 0 - 30 mg/g see below   Creatinine, Urine mg/dL 96.50   Microalbumin, Urine Random mg/dL <1.2           She has hyperlipidemia and is on Lipitor 20 mg daily  She denies muscle aches and muscle weakness or side effects  She denies a history of coronary artery disease and cerebrovascular disease   Latest Reference Range & Units 10/02/24 07:47   Cholesterol,Tot 100 - 199 mg/dL 222 (H)   Triglycerides 0 - 149 mg/dL 142   HDL >=40 mg/dL 39 !   LDL <100 mg/dL 155 (H)       She had an eye exam in this year  at [facility] but we do not have records      She has thyroid nodules and last US was on 3/15/2023   This now showed a mixed or complex hypoechoic 1.1 cm nodule on the left mid lobe  She denies a family hx of thyroid cancer and radiation exposure  She is euthyroid  Her TSH was normal      Latest Reference Range & Units 10/02/24 07:47   TSH 0.350 - 5.500 uIU/mL 2.160   Free T-4 0.93 - 1.70 ng/dL 1.08       Ultrasound-guided biopsy of the thyroid nodule on November 2023 came back as benign            BG Diary:  Patient forgot her meter    Weight is stable      Diabetes Complications   Retinopathy: No known retinopathy.  Last eye exam: 2024 at [facility] no records  Neuropathy: Denies paresthesias or numbness in hands or feet. Denies any foot wounds.  Exercise: Minimal.  Diet: Fair.  Patient's medications, allergies, and social histories were reviewed and updated as appropriate.    ROS:     CONS:     No fever, no chills   EYES:     No diplopia, no blurry vision   CV:           No chest pain, no palpitations   PULM:     No SOB, no cough, no hemoptysis.   GI:            No nausea, no vomiting, no diarrhea, no constipation   ENDO:     No polyuria, no polydipsia, no heat intolerance, no cold intolerance       Past Medical History:  Problem List:  2024-12: Menorrhagia  2024-12: Iron deficiency anemia  2024-12: GI bleed  2024-12: H pylori ulcer  2024-12: Acute blood loss anemia  2024-12: Nausea  2024-01: Acute pharyngitis due to other specified organisms  2024-01: Upper respiratory disease  2023-08: Pain from implanted hardware  2023-03: Dyslipidemia  2023-03: Multiple thyroid nodules  2023-03: Thyroid nodule  2023-01: Closed fracture of right distal radius  2022-09: Controlled type 2 diabetes mellitus without complication,   without long-term current use of insulin (HCC)  2022-06: COVID-19  2022-03: Microalbuminuria  2021-12: Bilateral calf pain  2021-11: Morning headache  2021-09: Long term (current) use of oral  "hypoglycemic drugs  2021-04: Palpitations  2018-07: Hyperlipidemia associated with type 2 diabetes mellitus (HCC)  2018-07: Elevated liver enzymes  2016-07: Seasonal allergies  2011-07: Acne  2010-09: Diabetes mellitus, type II (HCC)  2010-09: Vitamin D deficiency  2010-09: ASTHMA  2010-07: Hyperglycemia      Past Surgical History:  Past Surgical History:   Procedure Laterality Date    [place] UPPER GI ENDOSCOPY,DIAGNOSIS N/A 12/11/2024    Procedure: GASTROSCOPY;  Surgeon: Robert Bloom M.D.;  Location: SURGERY SAME DAY [facility];  Service: Gastroenterology    NY UPPER GI ENDOSCOPY,BIOPSY N/A 12/11/2024    Procedure: GASTROSCOPY, WITH BIOPSY;  Surgeon: Robert Bloom M.D.;  Location: SURGERY SAME DAY [facility];  Service: Gastroenterology    ORIF, WRIST Right 1/27/2023    Procedure: RIGHT DISTAL RADIUS OPEN REDUCTION INTERNAL FIXATION, REPAIRS AS INDICATED;  Surgeon: Perri Orozco M.D.;  Location: [facility];  Service: Orthopedics    PRIMARY C SECTION      TUBAL COAGULATION LAPAROSCOPIC BILATERAL          Allergies:  Patient has no known allergies.     Social History:  Social History     Tobacco Use    Smoking status: Never    Smokeless tobacco: Never   Vaping Use    Vaping status: Never Used   Substance Use Topics    Alcohol use: Not Currently     Comment: 4 per year    Drug use: Never        Family History:   family history includes Arrythmia in her mother; Diabetes in her brother and maternal grandmother; Heart Disease in her mother; Hyperlipidemia in her brother and mother; Hypertension in her mother.      PHYSICAL EXAM:   OBJECTIVE:  Vital signs: /70 (BP Location: Left arm, Patient Position: Sitting, BP Cuff Size: Adult long)   Pulse 92   Ht 1.575 m (5' 2\")   Wt 61.2 kg (135 lb)   SpO2 96%   BMI 24.69 kg/m²   GENERAL: Well-developed, well-nourished in no apparent distress.   EYE:  No ocular asymmetry, PERRLA  HENT: Pink, moist mucous membranes.    NECK: No thyromegaly. "   CARDIOVASCULAR:  No murmurs  LUNGS: Clear breath sounds  ABDOMEN: Soft, nontender   EXTREMITIES: No clubbing, cyanosis, or edema.   NEUROLOGICAL: No gross focal motor abnormalities   LYMPH: No cervical adenopathy palpated.   SKIN: No rashes, lesions.     Labs:  Lab Results   Component Value Date/Time    HBA1C 6.3 (A) 09/11/2024 04:09 PM        Lab Results   Component Value Date/Time    WBC 4.6 (L) 01/07/2025 12:39 PM    WBC 6.1 07/20/2010 09:00 AM    RBC 5.28 01/07/2025 12:39 PM    RBC 4.99 07/20/2010 09:00 AM    HEMOGLOBIN 12.2 01/07/2025 12:39 PM    MCV 78.8 (L) 01/07/2025 12:39 PM    MCV 90 07/20/2010 09:00 AM    MCH 23.1 (L) 01/07/2025 12:39 PM    MCH 28.5 07/20/2010 09:00 AM    MCHC 29.3 (L) 01/07/2025 12:39 PM    RDW 78.6 (H) 01/07/2025 12:39 PM    RDW 15.1 (H) 07/20/2010 09:00 AM    MPV 10.1 01/07/2025 12:39 PM       Lab Results   Component Value Date/Time    SODIUM 136 12/12/2024 01:44 AM    POTASSIUM 4.1 12/12/2024 01:44 AM    CHLORIDE 101 12/12/2024 01:44 AM    CO2 24 12/12/2024 01:44 AM    ANION 11.0 12/12/2024 01:44 AM    GLUCOSE 107 (H) 12/12/2024 01:44 AM    BUN 10 12/12/2024 01:44 AM    CREATININE 0.68 12/12/2024 01:44 AM    CREATININE 0.67 07/20/2010 09:00 AM    CALCIUM 8.8 12/12/2024 01:44 AM    ASTSGOT 25 12/11/2024 02:08 AM    ALTSGPT 25 12/11/2024 02:08 AM    TBILIRUBIN <0.2 12/11/2024 02:08 AM    ALBUMIN 4.4 12/11/2024 02:08 AM    TOTPROTEIN 7.3 12/11/2024 02:08 AM    GLOBULIN 2.9 12/11/2024 02:08 AM    AGRATIO 1.5 12/11/2024 02:08 AM       Lab Results   Component Value Date/Time    CHOLSTRLTOT 188 03/19/2022 1122    TRIGLYCERIDE 144 03/19/2022 1122    HDL 37 (A) 03/19/2022 1122     (H) 03/19/2022 1122       Lab Results   Component Value Date/Time    MALBCRT see below 10/02/2024 07:47 AM    MICROALBUR <1.2 10/02/2024 07:47 AM        Lab Results   Component Value Date/Time    TSHULTRASEN 1.150 03/19/2022 1122     Lab Results   Component Value Date/Time    FREEDIR 1.02 07/20/2010 0900      No results found for: FREET3  No results found for: THYSTIMIG    IMAGING:  3/15/2023 4:38 PM     HISTORY/REASON FOR EXAM:  Palpable lump, follow up prior ultrasound.     TECHNIQUE/EXAM DESCRIPTION:  Ultrasound of the soft tissues of the head and neck.     COMPARISON:  1/28/2022 ultrasound of the soft tissues of the head and neck.     FINDINGS:  The thyroid gland is heterogeneous.  Vascularity is normal.     The right lobe of the thyroid gland measures 1.65 cm x 5.13 cm x 1.45 cm.  The left lobe of the thyroid gland measures 1.30 cm x 4.45 cm x 1.67 cm.  The isthmus measures 0.22 cm.     Nodules >= 1cm:     Nodule #1  Location:  Left  mid  Size:  1.1 x 0.8 x 0.7 cm  Composition:  Mixed-1  Echogenicity:  Hypoechoic-2  Shape:  Wider than tall-0  Margins:  Smooth-0  Echogenic Foci:  None-0     ACR TIRADS points/category:  3 - TR3 - Mildly Suspicious     Several additional subcentimeter thyroid nodules do not meet criteria for biopsy or follow-up.     IMPRESSION:     1.  Left thyroid nodule is stable since prior study. No new thyroid nodules or masses.  2.  Several additional subcentimeter thyroid nodules do not meet criteria for biopsy or follow-up.     ACR TI-RADS Recommendations  TR3 - No FNA or follow up recommended.     Recommendations based on the American College of Radiology Thyroid imaging, reporting and Data System (TI-RADS) 2017.     INTERPRETING LOCATION: [address] [place] [address], [facility]      ASSESSMENT/PLAN:     1. Controlled type 2 diabetes mellitus without complication, without long-term current use of insulin (HCC)  Controlled  A1c is stable and controlled without lows  Continue metformin extended release 500 mg twice a day  Continue Ozempic 0.5 mg weekly  We will start appeal to the denial for the prior authorization of Ozempic  I will see her again in 3 months get labs in 3 months      2. Dyslipidemia  Controlled  Continue atorvastatin  Repeat fasting lipids in 3 months    3. Multiple thyroid  nodules  Stable reviewed thyroid ultrasound images with patient  She has a benign nodule of the left mid lobe  Recommend observation  I ordered an ultrasound to be done with radiology will discuss the findings when she comes back in 3 months    4. Vitamin D deficiency  Stable  Continue weekly vitamin D  Repeat calcium vitamin D levels in 3 months    5. Long term (current) use of oral hypoglycemic drugs  Patient is on oral agents and a GLP-1 for type 2 diabetes management      Return in about 3 months (around 8/5/2025).        Thank you kindly for allowing me to participate in the diabetes care plan for this patient.    José Luis Ragsdale MD, FACE, ECNU      CC:   Pcp Pt States None

## 2025-05-06 ENCOUNTER — HOSPITAL ENCOUNTER (OUTPATIENT)
Dept: LAB | Facility: MEDICAL CENTER | Age: 50
End: 2025-05-06
Attending: INTERNAL MEDICINE
Payer: COMMERCIAL

## 2025-05-06 DIAGNOSIS — D50.9 IRON DEFICIENCY ANEMIA, UNSPECIFIED IRON DEFICIENCY ANEMIA TYPE: ICD-10-CM

## 2025-05-06 DIAGNOSIS — E11.9 CONTROLLED TYPE 2 DIABETES MELLITUS WITHOUT COMPLICATION, WITHOUT LONG-TERM CURRENT USE OF INSULIN (HCC): ICD-10-CM

## 2025-05-06 DIAGNOSIS — E78.5 DYSLIPIDEMIA: ICD-10-CM

## 2025-05-06 DIAGNOSIS — E04.2 MULTIPLE THYROID NODULES: ICD-10-CM

## 2025-05-06 DIAGNOSIS — Z79.84 LONG TERM (CURRENT) USE OF ORAL HYPOGLYCEMIC DRUGS: ICD-10-CM

## 2025-05-06 DIAGNOSIS — E55.9 VITAMIN D DEFICIENCY: ICD-10-CM

## 2025-05-06 LAB
25(OH)D3 SERPL-MCNC: 18 NG/ML (ref 30–100)
ALBUMIN SERPL BCP-MCNC: 4.4 G/DL (ref 3.2–4.9)
ALBUMIN/GLOB SERPL: 1.2 G/DL
ALP SERPL-CCNC: 68 U/L (ref 30–99)
ALT SERPL-CCNC: 18 U/L (ref 2–50)
ANION GAP SERPL CALC-SCNC: 8 MMOL/L (ref 7–16)
AST SERPL-CCNC: 22 U/L (ref 12–45)
BASOPHILS # BLD AUTO: 0.8 % (ref 0–1.8)
BASOPHILS # BLD: 0.05 K/UL (ref 0–0.12)
BILIRUB SERPL-MCNC: 0.2 MG/DL (ref 0.1–1.5)
BUN SERPL-MCNC: 11 MG/DL (ref 8–22)
CALCIUM ALBUM COR SERPL-MCNC: 9.1 MG/DL (ref 8.5–10.5)
CALCIUM SERPL-MCNC: 9.4 MG/DL (ref 8.5–10.5)
CHLORIDE SERPL-SCNC: 103 MMOL/L (ref 96–112)
CHOLEST SERPL-MCNC: 206 MG/DL (ref 100–199)
CO2 SERPL-SCNC: 26 MMOL/L (ref 20–33)
CREAT SERPL-MCNC: 0.66 MG/DL (ref 0.5–1.4)
CREAT UR-MCNC: 135 MG/DL
EOSINOPHIL # BLD AUTO: 0.15 K/UL (ref 0–0.51)
EOSINOPHIL NFR BLD: 2.5 % (ref 0–6.9)
ERYTHROCYTE [DISTWIDTH] IN BLOOD BY AUTOMATED COUNT: 44.8 FL (ref 35.9–50)
EST. AVERAGE GLUCOSE BLD GHB EST-MCNC: 114 MG/DL
FERRITIN SERPL-MCNC: 13.5 NG/ML (ref 10–291)
GFR SERPLBLD CREATININE-BSD FMLA CKD-EPI: 106 ML/MIN/1.73 M 2
GLOBULIN SER CALC-MCNC: 3.6 G/DL (ref 1.9–3.5)
GLUCOSE SERPL-MCNC: 116 MG/DL (ref 65–99)
HBA1C MFR BLD: 5.6 % (ref 4–5.6)
HCT VFR BLD AUTO: 42.8 % (ref 37–47)
HDLC SERPL-MCNC: 40 MG/DL
HGB BLD-MCNC: 14.2 G/DL (ref 12–16)
IMM GRANULOCYTES # BLD AUTO: 0.01 K/UL (ref 0–0.11)
IMM GRANULOCYTES NFR BLD AUTO: 0.2 % (ref 0–0.9)
IRON SATN MFR SERPL: 8 % (ref 15–55)
IRON SERPL-MCNC: 30 UG/DL (ref 40–170)
LDLC SERPL CALC-MCNC: 143 MG/DL
LYMPHOCYTES # BLD AUTO: 1.49 K/UL (ref 1–4.8)
LYMPHOCYTES NFR BLD: 24.4 % (ref 22–41)
MCH RBC QN AUTO: 30.5 PG (ref 27–33)
MCHC RBC AUTO-ENTMCNC: 33.2 G/DL (ref 32.2–35.5)
MCV RBC AUTO: 91.8 FL (ref 81.4–97.8)
MICROALBUMIN UR-MCNC: <1.2 MG/DL
MICROALBUMIN/CREAT UR: NORMAL MG/G (ref 0–30)
MONOCYTES # BLD AUTO: 0.55 K/UL (ref 0–0.85)
MONOCYTES NFR BLD AUTO: 9 % (ref 0–13.4)
NEUTROPHILS # BLD AUTO: 3.86 K/UL (ref 1.82–7.42)
NEUTROPHILS NFR BLD: 63.1 % (ref 44–72)
NRBC # BLD AUTO: 0 K/UL
NRBC BLD-RTO: 0 /100 WBC (ref 0–0.2)
PLATELET # BLD AUTO: 373 K/UL (ref 164–446)
PMV BLD AUTO: 10.2 FL (ref 9–12.9)
POTASSIUM SERPL-SCNC: 5.2 MMOL/L (ref 3.6–5.5)
PROT SERPL-MCNC: 8 G/DL (ref 6–8.2)
RBC # BLD AUTO: 4.66 M/UL (ref 4.2–5.4)
SODIUM SERPL-SCNC: 137 MMOL/L (ref 135–145)
T4 FREE SERPL-MCNC: 1.13 NG/DL (ref 0.93–1.7)
TIBC SERPL-MCNC: 366 UG/DL (ref 250–450)
TRIGL SERPL-MCNC: 117 MG/DL (ref 0–149)
TSH SERPL-ACNC: 1.25 UIU/ML (ref 0.38–5.33)
UIBC SERPL-MCNC: 336 UG/DL (ref 110–370)
WBC # BLD AUTO: 6.1 K/UL (ref 4.8–10.8)

## 2025-05-06 PROCEDURE — 82570 ASSAY OF URINE CREATININE: CPT

## 2025-05-06 PROCEDURE — 83540 ASSAY OF IRON: CPT

## 2025-05-06 PROCEDURE — 82043 UR ALBUMIN QUANTITATIVE: CPT

## 2025-05-06 PROCEDURE — 85025 COMPLETE CBC W/AUTO DIFF WBC: CPT

## 2025-05-06 PROCEDURE — 84439 ASSAY OF FREE THYROXINE: CPT

## 2025-05-06 PROCEDURE — 80061 LIPID PANEL: CPT

## 2025-05-06 PROCEDURE — 83036 HEMOGLOBIN GLYCOSYLATED A1C: CPT

## 2025-05-06 PROCEDURE — 80053 COMPREHEN METABOLIC PANEL: CPT

## 2025-05-06 PROCEDURE — 84443 ASSAY THYROID STIM HORMONE: CPT

## 2025-05-06 PROCEDURE — 82306 VITAMIN D 25 HYDROXY: CPT

## 2025-05-06 PROCEDURE — 82728 ASSAY OF FERRITIN: CPT

## 2025-05-06 PROCEDURE — 83550 IRON BINDING TEST: CPT

## 2025-05-06 PROCEDURE — 36415 COLL VENOUS BLD VENIPUNCTURE: CPT

## 2025-05-07 ENCOUNTER — RESULTS FOLLOW-UP (OUTPATIENT)
Dept: MEDICAL GROUP | Age: 50
End: 2025-05-07
Payer: COMMERCIAL

## 2025-05-08 ENCOUNTER — OFFICE VISIT (OUTPATIENT)
Dept: MEDICAL GROUP | Age: 50
End: 2025-05-08
Payer: COMMERCIAL

## 2025-05-08 VITALS
SYSTOLIC BLOOD PRESSURE: 104 MMHG | TEMPERATURE: 98.6 F | OXYGEN SATURATION: 97 % | DIASTOLIC BLOOD PRESSURE: 66 MMHG | RESPIRATION RATE: 16 BRPM | HEIGHT: 62 IN | WEIGHT: 133 LBS | HEART RATE: 88 BPM | BODY MASS INDEX: 24.48 KG/M2

## 2025-05-08 DIAGNOSIS — E55.9 VITAMIN D DEFICIENCY: ICD-10-CM

## 2025-05-08 DIAGNOSIS — E78.5 DYSLIPIDEMIA: ICD-10-CM

## 2025-05-08 DIAGNOSIS — R06.2 WHEEZING: ICD-10-CM

## 2025-05-08 DIAGNOSIS — R20.0 FACIAL NUMBNESS: ICD-10-CM

## 2025-05-08 DIAGNOSIS — D50.9 IRON DEFICIENCY ANEMIA, UNSPECIFIED IRON DEFICIENCY ANEMIA TYPE: ICD-10-CM

## 2025-05-08 PROCEDURE — 99214 OFFICE O/P EST MOD 30 MIN: CPT | Performed by: INTERNAL MEDICINE

## 2025-05-08 PROCEDURE — 3078F DIAST BP <80 MM HG: CPT | Performed by: INTERNAL MEDICINE

## 2025-05-08 PROCEDURE — 3074F SYST BP LT 130 MM HG: CPT | Performed by: INTERNAL MEDICINE

## 2025-05-08 RX ORDER — ALBUTEROL SULFATE 90 UG/1
2 INHALANT RESPIRATORY (INHALATION) EVERY 4 HOURS PRN
Qty: 9 G | Refills: 4 | Status: SHIPPED | OUTPATIENT
Start: 2025-05-08

## 2025-05-08 RX ORDER — ERGOCALCIFEROL 1.25 MG/1
50000 CAPSULE, LIQUID FILLED ORAL
Qty: 12 CAPSULE | Refills: 2 | Status: SHIPPED | OUTPATIENT
Start: 2025-05-08

## 2025-05-08 RX ORDER — PRAVASTATIN SODIUM 40 MG
40 TABLET ORAL NIGHTLY
Qty: 90 TABLET | Refills: 2 | Status: SHIPPED | OUTPATIENT
Start: 2025-05-08

## 2025-05-08 ASSESSMENT — ENCOUNTER SYMPTOMS
DIARRHEA: 0
BLOOD IN STOOL: 0

## 2025-05-08 ASSESSMENT — FIBROSIS 4 INDEX: FIB4 SCORE: 0.71

## 2025-05-08 NOTE — PROGRESS NOTES
"CC:   Chief Complaint   Patient presents with    Lab Results    Numbness     X1 week, \"Facial numbness, started on the left side, then moved to the right. After taking two Asprin's the numbness subsided.\"    Medication Refill     Albuterol inhaler, pravastatin, vitamin D      Diagnoses of Iron deficiency anemia, unspecified iron deficiency anemia type, Facial numbness, Dyslipidemia, Wheezing, and Vitamin D deficiency were pertinent to this visit.  Verbal consent was acquired by the patient to use [employer] ambient listening note generation during this visit Yes     History of Present Illness  Sybil is a pleasant 50 y.o. female with a history of type 2 diabetes and iron deficiency anemia, presenting for evaluation of iron deficiency anemia.    She was admitted to the hospital on 12/10/2024 due to iron deficiency anemia and suspicion of GI bleeding. She has undergone an upper endoscopy which showed mild gastritis, negative for ulcer or active bleeding. She completed H. pylori treatment and was advised to follow up with GYN for her heavy menstrual bleeding.     She continues her iron supplementation and is considering increasing the dosage from one to two tablets. Her diet includes yogurt, red meat, green leafy vegetables, and salads.    For diabetes, she is taking Ozempic 0.5 mg weekly and metformin 1000 mg twice daily. Her most recent A1c is 5.6%, indicating excellent diabetes control.    She experienced an episode of facial numbness last Thursday while at home, which she attributes to fatigue or stress. The numbness originated from the bottom of her face and extended upwards to her cheekbone. This was a new symptom for her. She took two doses of aspirin 81 mg, which eventually alleviated the numbness.    She is currently menstruating and reports severe cramping this month, describing it as similar to labor pains. She experiences these cramps every 5 minutes. She has not yet consulted a gynecologist regarding " her heavy menstrual bleeding due to insurance issues but plans to schedule an appointment now that she has coverage.    She is seeking refills for her pravastatin and inhaler prescriptions. Additionally, she is inquiring about the continuation of her vitamin D supplementation.    Current Outpatient Medications Ordered in Epic   Medication Sig Dispense Refill    pravastatin (PRAVACHOL) 40 MG tablet Take 1 Tablet by mouth every evening. 90 Tablet 2    albuterol 108 (90 Base) MCG/ACT Aero Soln inhalation aerosol Inhale 2 Puffs every four hours as needed for Shortness of Breath. 9 g 4    vitamin D2, Ergocalciferol, (DRISDOL) 1.25 MG (56160 UT) Cap capsule Take 1 Capsule by mouth every 7 days. 12 Capsule 2    pregabalin (LYRICA) 150 MG Cap Take 1 capsule every day by oral route at bedtime for 30 days. 30 Capsule 1    oxyCODONE immediate-release (ROXICODONE) 5 MG Tab Take 1 tablet every day by oral route as needed for 30 days. 10 Tablet 0    oxyCODONE immediate-release (ROXICODONE) 5 MG Tab Take 1 tablet every day by oral route as needed for 30 days. 10 Tablet 0    ondansetron (ZOFRAN ODT) 4 MG TABLET DISPERSIBLE Place 1 tablet every day by translingual route as needed for 30 days. 20 Tablet 1    pregabalin (LYRICA) 25 MG Cap Take 1 capsule twice a day by oral route as needed for 30 days. 60 Capsule 1    pregabalin (LYRICA) 25 MG Cap Take 1 Capsule by mouth 2 times a day as needed (pain).      metFORMIN ER (GLUCOPHAGE XR) 500 MG TABLET SR 24 HR Take 2 Tablets by mouth 2 times a day. 360 Tablet 2    ondansetron (ZOFRAN ODT) 4 MG TABLET DISPERSIBLE Take 1 Tablet by mouth every 6 hours as needed for Nausea/Vomiting. 10 Tablet 0    triamcinolone acetonide (KENALOG) 0.1 % Cream Apply 1 Application topically 2 times a day. (Patient not taking: Reported on 5/5/2025) 45 g 0    pregabalin (LYRICA) 150 MG Cap Take 150 mg by mouth at bedtime as needed (pain).      Semaglutide,0.25 or 0.5MG/DOS, (OZEMPIC, 0.25 OR 0.5 MG/DOSE,) 2  "MG/3ML Solution Pen-injector Inject 0.5 mg under the skin every 7 days. 9 mL 2     No current Epic-ordered facility-administered medications on file.     Review of Systems   Gastrointestinal:  Negative for blood in stool and diarrhea.   All other systems reviewed and are negative.      Objective:     Exam:  /66 (BP Location: Right arm, Patient Position: Sitting, BP Cuff Size: Adult)   Pulse 88   Temp 37 °C (98.6 °F) (Temporal)   Resp 16   Ht 1.575 m (5' 2\")   Wt 60.3 kg (133 lb)   LMP 05/03/2025 (Exact Date)   SpO2 97%   Breastfeeding No   BMI 24.33 kg/m²  Body mass index is 24.33 kg/m².    Physical Exam  Constitutional:       Appearance: Normal appearance.   HENT:      Head: Normocephalic and atraumatic.   Pulmonary:      Effort: Pulmonary effort is normal. No respiratory distress.   Neurological:      Mental Status: She is alert and oriented to person, place, and time.   Psychiatric:         Mood and Affect: Mood normal.         Behavior: Behavior normal.         Thought Content: Thought content normal.         Judgment: Judgment normal.         Results:   Latest Reference Range & Units 05/06/25 09:48   WBC 4.8 - 10.8 K/uL 6.1   RBC 4.20 - 5.40 M/uL 4.66   Hemoglobin 12.0 - 16.0 g/dL 14.2   Hematocrit 37.0 - 47.0 % 42.8   MCV 81.4 - 97.8 fL 91.8   MCH 27.0 - 33.0 pg 30.5   MCHC 32.2 - 35.5 g/dL 33.2   RDW 35.9 - 50.0 fL 44.8   Platelet Count 164 - 446 K/uL 373   MPV 9.0 - 12.9 fL 10.2   Neutrophils-Polys 44.00 - 72.00 % 63.10   Neutrophils (Absolute) 1.82 - 7.42 K/uL 3.86   Lymphocytes 22.00 - 41.00 % 24.40   Lymphs (Absolute) 1.00 - 4.80 K/uL 1.49   Monocytes 0.00 - 13.40 % 9.00   Monos (Absolute) 0.00 - 0.85 K/uL 0.55   Eosinophils 0.00 - 6.90 % 2.50   Eos (Absolute) 0.00 - 0.51 K/uL 0.15   Basophils 0.00 - 1.80 % 0.80   Baso (Absolute) 0.00 - 0.12 K/uL 0.05   Immature Granulocytes 0.00 - 0.90 % 0.20   Immature Granulocytes (abs) 0.00 - 0.11 K/uL 0.01   Nucleated RBC 0.00 - 0.20 /100 WBC 0.00 "   NRBC (Absolute) K/uL 0.00   Sodium 135 - 145 mmol/L 137   Potassium 3.6 - 5.5 mmol/L 5.2   Chloride 96 - 112 mmol/L 103   Co2 20 - 33 mmol/L 26   Anion Gap 7.0 - 16.0  8.0   Glucose 65 - 99 mg/dL 116 (H)   Bun 8 - 22 mg/dL 11   Creatinine 0.50 - 1.40 mg/dL 0.66   GFR (CKD-EPI) >60 mL/min/1.73 m 2 106   Calcium 8.5 - 10.5 mg/dL 9.4   Correct Calcium 8.5 - 10.5 mg/dL 9.1   AST(SGOT) 12 - 45 U/L 22   ALT(SGPT) 2 - 50 U/L 18   Alkaline Phosphatase 30 - 99 U/L 68   Total Bilirubin 0.1 - 1.5 mg/dL 0.2   Albumin 3.2 - 4.9 g/dL 4.4   Total Protein 6.0 - 8.2 g/dL 8.0   Globulin 1.9 - 3.5 g/dL 3.6 (H)   A-G Ratio g/dL 1.2   Iron 40 - 170 ug/dL 30 (L)   Total Iron Binding 250 - 450 ug/dL 366   % Saturation 15 - 55 % 8 (L)   Unsat Iron Binding 110 - 370 ug/dL 336   Glycohemoglobin 4.0 - 5.6 % 5.6   Estim. Avg Glu mg/dL 114   Cholesterol,Tot 100 - 199 mg/dL 206 (H)   Triglycerides 0 - 149 mg/dL 117   HDL >=40 mg/dL 40   LDL <100 mg/dL 143 (H)   Micro Alb Creat Ratio 0 - 30 mg/g see below   Creatinine, Urine mg/dL 135.00   Microalbumin, Urine Random mg/dL <1.2   (H): Data is abnormally high  (L): Data is abnormally low  Results      Assessment & Plan:   Sybil  is a pleasant 50 y.o. female with the following -     Assessment & Plan  1. Iron Deficiency Anemia.  - Iron levels have decreased again despite taking iron supplements.  - Red blood cell count is good, but iron levels are low.  - Advised to continue current iron supplement and may consider increasing dosage to two tablets if it does not cause gastrointestinal issues.  - Repeat iron level test ordered in 3 months.    2. Dysmenorrhea.  - Reports heavy menstrual bleeding and severe cramping.  - Advised to consult a gynecologist for further evaluation and management of symptoms.   - Heavy bleeding and cramping persist, affecting quality of life.  - Emphasis on the need for gynecological intervention to manage bleeding and associated symptoms.    3. Type 2 Diabetes.  - Most  recent A1c is 5.6%, indicating excellent diabetes control.  - Currently taking Ozempic 0.5 mg weekly and metformin 1000 mg twice daily.  - Continued monitoring of blood glucose levels and adherence to medication regimen.  - No changes in diabetes management plan at this time.    4. Facial Numbness.  - Experienced an episode of facial numbness last week, which resolved after taking aspirin.  - Differential diagnoses include stress, fatigue, nerve irritation, or low blood sugar.   - She is not on daily aspirin due to iron deficiency  - If unilateral numbness, difficulty speaking, facial paralysis, or weakness in arm, hand, or leg occurs, immediate medical attention is advised as these could be signs of a stroke.      Problem List Items Addressed This Visit       Dyslipidemia    Relevant Medications    pravastatin (PRAVACHOL) 40 MG tablet    Facial numbness    Iron deficiency anemia    Relevant Orders    IRON/TOTAL IRON BIND    FERRITIN    CBC WITH DIFFERENTIAL    Vitamin D deficiency    Relevant Medications    vitamin D2, Ergocalciferol, (DRISDOL) 1.25 MG (72904 UT) Cap capsule     Other Visit Diagnoses         Wheezing        Relevant Medications    albuterol 108 (90 Base) MCG/ACT Aero Soln inhalation aerosol          Follow-up  - Follow-up appointment scheduled in 3 months.    Please note that this dictation was created using voice recognition software. I have made every reasonable attempt to correct obvious errors, but I expect that there are errors of grammar and possibly content that I did not discover before finalizing the note.

## 2025-05-09 PROCEDURE — RXMED WILLOW AMBULATORY MEDICATION CHARGE: Performed by: ANESTHESIOLOGY

## 2025-05-12 PROCEDURE — RXMED WILLOW AMBULATORY MEDICATION CHARGE: Performed by: ANESTHESIOLOGY

## 2025-05-14 ENCOUNTER — PHARMACY VISIT (OUTPATIENT)
Dept: PHARMACY | Facility: MEDICAL CENTER | Age: 50
End: 2025-05-14
Payer: MEDICARE

## 2025-05-20 ENCOUNTER — TELEPHONE (OUTPATIENT)
Dept: ENDOCRINOLOGY | Facility: MEDICAL CENTER | Age: 50
End: 2025-05-20
Payer: COMMERCIAL

## 2025-05-20 NOTE — TELEPHONE ENCOUNTER
Received Refill PA request via [employer]ax  for Ozempic (0.25 or 0.5 MG/DOSE) 2MG/3ML pen-injectors. (Quantity:9ml, Day Supply:84)     Insurance: [employer] [place] [employer]  Member ID: [id number]  BIN: [id number]  PCN: [id number]  Group: [id number]     Ran Test claim via [employer] & medication Rejects stating prior authorization is required.    Prior Authorization has been submitted via CM key: [id number]    Prior Authorization has been approved    Prior Authorization reference number:[id number]    Effective dates: 05/19/2025-05/19/2026  Copay: $74.98     Is patient eligible to fill with [facility]? Yes    Next Steps: Called and advised patient on PA Approval. Patient is currently filling RX at [facility] in [facility].        Thank you,  Minal Petersen, [facility]  Endo RX Coordinator  [phone]

## 2025-06-10 PROCEDURE — RXMED WILLOW AMBULATORY MEDICATION CHARGE: Performed by: ANESTHESIOLOGY

## 2025-06-18 PROCEDURE — RXMED WILLOW AMBULATORY MEDICATION CHARGE: Performed by: ANESTHESIOLOGY

## 2025-06-21 ENCOUNTER — PHARMACY VISIT (OUTPATIENT)
Dept: PHARMACY | Facility: MEDICAL CENTER | Age: 50
End: 2025-06-21
Payer: MEDICARE

## 2025-06-21 PROCEDURE — RXMED WILLOW AMBULATORY MEDICATION CHARGE: Performed by: ANESTHESIOLOGY

## 2025-06-23 ENCOUNTER — PHARMACY VISIT (OUTPATIENT)
Dept: PHARMACY | Facility: MEDICAL CENTER | Age: 50
End: 2025-06-23
Payer: MEDICARE

## 2025-06-26 ENCOUNTER — OFFICE VISIT (OUTPATIENT)
Dept: MEDICAL GROUP | Age: 50
End: 2025-06-26
Payer: COMMERCIAL

## 2025-06-26 ENCOUNTER — PHARMACY VISIT (OUTPATIENT)
Dept: PHARMACY | Facility: MEDICAL CENTER | Age: 50
End: 2025-06-26
Payer: COMMERCIAL

## 2025-06-26 ENCOUNTER — RESULTS FOLLOW-UP (OUTPATIENT)
Dept: MEDICAL GROUP | Age: 50
End: 2025-06-26

## 2025-06-26 ENCOUNTER — HOSPITAL ENCOUNTER (OUTPATIENT)
Facility: MEDICAL CENTER | Age: 50
End: 2025-06-26
Attending: INTERNAL MEDICINE
Payer: COMMERCIAL

## 2025-06-26 VITALS
OXYGEN SATURATION: 99 % | WEIGHT: 136 LBS | RESPIRATION RATE: 16 BRPM | HEART RATE: 70 BPM | DIASTOLIC BLOOD PRESSURE: 78 MMHG | BODY MASS INDEX: 25.03 KG/M2 | TEMPERATURE: 97.6 F | SYSTOLIC BLOOD PRESSURE: 108 MMHG | HEIGHT: 62 IN

## 2025-06-26 DIAGNOSIS — N39.0 URINARY TRACT INFECTION WITHOUT HEMATURIA, SITE UNSPECIFIED: ICD-10-CM

## 2025-06-26 DIAGNOSIS — R30.0 DYSURIA: Primary | ICD-10-CM

## 2025-06-26 DIAGNOSIS — G90.511 COMPLEX REGIONAL PAIN SYNDROME TYPE 1 OF RIGHT UPPER EXTREMITY: Chronic | ICD-10-CM

## 2025-06-26 DIAGNOSIS — R41.3 MEMORY CHANGE: Chronic | ICD-10-CM

## 2025-06-26 DIAGNOSIS — E53.8 VITAMIN B12 DEFICIENCY: ICD-10-CM

## 2025-06-26 LAB
APPEARANCE UR: NORMAL
BILIRUB UR STRIP-MCNC: NEGATIVE MG/DL
COLOR UR AUTO: NORMAL
GLUCOSE UR STRIP.AUTO-MCNC: NEGATIVE MG/DL
KETONES UR STRIP.AUTO-MCNC: NEGATIVE MG/DL
LEUKOCYTE ESTERASE UR QL STRIP.AUTO: NORMAL
NITRITE UR QL STRIP.AUTO: NEGATIVE
PH UR STRIP.AUTO: 6 [PH] (ref 5–8)
PROT UR QL STRIP: NEGATIVE MG/DL
RBC UR QL AUTO: NEGATIVE
SP GR UR STRIP.AUTO: 1.02
UROBILINOGEN UR STRIP-MCNC: 0.2 MG/DL

## 2025-06-26 PROCEDURE — 3078F DIAST BP <80 MM HG: CPT | Performed by: INTERNAL MEDICINE

## 2025-06-26 PROCEDURE — 99214 OFFICE O/P EST MOD 30 MIN: CPT | Performed by: INTERNAL MEDICINE

## 2025-06-26 PROCEDURE — 3074F SYST BP LT 130 MM HG: CPT | Performed by: INTERNAL MEDICINE

## 2025-06-26 PROCEDURE — 87086 URINE CULTURE/COLONY COUNT: CPT

## 2025-06-26 PROCEDURE — 81002 URINALYSIS NONAUTO W/O SCOPE: CPT | Performed by: INTERNAL MEDICINE

## 2025-06-26 PROCEDURE — RXMED WILLOW AMBULATORY MEDICATION CHARGE: Performed by: INTERNAL MEDICINE

## 2025-06-26 RX ORDER — NITROFURANTOIN 25; 75 MG/1; MG/1
100 CAPSULE ORAL 2 TIMES DAILY
Qty: 10 CAPSULE | Refills: 0 | Status: SHIPPED | OUTPATIENT
Start: 2025-06-26 | End: 2025-07-01

## 2025-06-26 ASSESSMENT — FIBROSIS 4 INDEX: FIB4 SCORE: 0.71

## 2025-06-26 NOTE — PROGRESS NOTES
CC:   Chief Complaint   Patient presents with    Painful Urination     X1 day, Frequent urination. I started taking AZO.      The primary encounter diagnosis was Dysuria. Diagnoses of Urinary tract infection without hematuria, site unspecified, Vitamin B12 deficiency, Complex regional pain syndrome type 1 of right upper extremity, and Memory change were also pertinent to this visit.  Verbal consent was acquired by the patient to use [employer] ambient listening note generation during this visit Yes     History of Present Illness  Sybil is a pleasant 50 y.o. female with a history of type 2 diabetes, presenting for evaluation of burning and frequent urination, memory issues, and complex regional pain syndrome.    She has been experiencing dysuria and increased urinary frequency since the previous day. She attempted self-treatment with Azo, which provided some relief. Her last episode of a urinary tract infection was approximately 3 to 4 years ago. She reports inadequate water intake and has no known allergies to antibiotics. She has not experienced any systemic symptoms such as fever or chills.    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 She describes instances of short-term memory loss, such as forgetting recent events or conversations. She also reports difficulty in recalling information during lectures. She experiences fatigue but does not engage in any recreational activities or physical exercise. Her sleep pattern is irregular, often limited to 5 to 6 hours per night, and she struggles with insomnia when not on medication. She expresses concern about potential early-onset Alzheimer's disease.    She experiences chronic pain in her right wrist, extending to her shoulder, which disrupts her sleep. She has discontinued physical therapy for her arm due to perceived ineffectiveness. She has been diagnosed with complex regional  "pain syndrome.    Past Medical History[1]    Current Medications and Prescriptions Ordered in Epic[2]    Review of Systems   Genitourinary:  Positive for dysuria and frequency.   All other systems reviewed and are negative.    Objective:     Exam:  /78 (BP Location: Left arm, Patient Position: Sitting, BP Cuff Size: Adult)   Pulse 70   Temp 36.4 °C (97.6 °F) (Temporal)   Resp 16   Ht 1.575 m (5' 2\")   Wt 61.7 kg (136 lb)   SpO2 99%   BMI 24.87 kg/m²  Body mass index is 24.87 kg/m².    Physical Exam  Constitutional:       Appearance: Normal appearance.   HENT:      Head: Normocephalic and atraumatic.   Pulmonary:      Effort: Pulmonary effort is normal. No respiratory distress.   Neurological:      Mental Status: She is alert and oriented to person, place, and time.   Psychiatric:         Mood and Affect: Mood normal.         Behavior: Behavior normal.         Thought Content: Thought content normal.         Judgment: Judgment normal.         Assessment & Plan:   Sybil  is a pleasant 50 y.o. female with the following -     Assessment & Plan  1. Urinary Tract Infection.  - Symptoms of burning and frequent urination suggest a mild urinary tract infection.  - Increase water intake to at least 64 ounces daily to prevent dehydration and concentrated urine, which can lead to UTIs.  - Prescription for nitrofurantoin (Macrobid) for 5 days has been sent electronically. A urine culture will be conducted to ensure the correct bacteria are being treated and to check for antibiotic resistance.  - Continue taking Azo and the prescribed antibiotic for 5 days. If the urine culture results are abnormal, she will be notified.    2. Memory Issues.  - Memory issues may be related to stress, medications (oxycodone and Lyrica), or low vitamin B12 levels.  - Manage stress, ensure adequate rest, and aim for 7 to 8 hours of sleep per night. Engaging in relaxing activities such as walking or hiking is recommended.  - A recheck " of her vitamin B12 levels will be conducted during her upcoming labs in 08/2025.  - Consider reducing pain medications to see if it helps with memory issues.    3. Complex Regional Pain Syndrome.  - Nerve damage in the right wrist, diagnosed as complex regional pain syndrome.   - Referral to a physiatrist for further management of pain and potential physical therapy will be considered.   - Check with workmen's compensation provider regarding coverage for this referral.    Problem List Items Addressed This Visit       Complex regional pain syndrome type 1 of right upper extremity    Memory change     Other Visit Diagnoses         Dysuria    -  Primary    Relevant Medications    nitrofurantoin (MACROBID) 100 MG Cap    Other Relevant Orders    POCT Urinalysis (Completed)      Urinary tract infection without hematuria, site unspecified        Relevant Orders    URINE CULTURE(NEW)      Vitamin B12 deficiency        Relevant Orders    VITAMIN B12          Return for as previously scheduled 08/14/2025.    Please note that this dictation was created using voice recognition software. I have made every reasonable attempt to correct obvious errors, but I expect that there are errors of grammar and possibly content that I did not discover before finalizing the note.       [1]   Past Medical History:  Diagnosis Date    Allergy     Anemia     Anxiety     Lorazepam rarely    ASTHMA 09/03/2010    COVID-19 06/27/2022    Date of symptoms onset: 6/15/2022  Symptoms: Headache, fatigue, runny nose, sinusitis, cough productive with small amount of clear sputum. Fever: no fever  Date of COVID positive test: 06/15/2022, 6/24/2022  Medications tried: Dayquil, cold and flue OTC medicine, Tylenol  Home max temperature: normal  Home O2 saturation: has been able to tolerate PO but with reduced appetite. Normal urination.  CO    Depression 2008    no antidepressant    Diabetes mellitus type II 09/03/2010    Elevated liver enzymes 2009    GERD  (gastroesophageal reflux disease)     occasional    Hyperlipidemia     Multiple thyroid nodules 03/24/2023    Thyroid nodule 03/08/2023    Urinary tract infection, site not specified     Vitamin D deficiency 2009   [2]   Current Outpatient Medications Ordered in Epic   Medication Sig Dispense Refill    nitrofurantoin (MACROBID) 100 MG Cap Take 1 Capsule by mouth 2 times a day for 5 days. 10 Capsule 0    pregabalin (LYRICA) 150 MG Cap Take 1 capsule every day by oral route at bedtime for 30 days. 30 Capsule 2    pregabalin (LYRICA) 25 MG Cap Take 1 capsule twice a day by oral route as needed for 30 days. 60 Capsule 2    [START ON 8/8/2025] oxyCODONE immediate-release (ROXICODONE) 5 MG Tab Take 1 tablet every day by oral route as needed for 30 days.  Do not fill before: 8/8/2025 10 Tablet 0    oxyCODONE immediate-release (ROXICODONE) 5 MG Tab Take 1 tablet every day by oral route as needed for 30 days. Do not fill before: 6/12/2025 10 Tablet 0    [START ON 7/10/2025] oxyCODONE immediate-release (ROXICODONE) 5 MG Tab Take 1 tablet every day by oral route as needed for 30 days.  Do not fill before: 7/10/2025 10 Tablet 0    ondansetron (ZOFRAN ODT) 4 MG TABLET DISPERSIBLE Place 1 tablet every day by translingual route as needed for 30 days. 20 Tablet 2    pravastatin (PRAVACHOL) 40 MG tablet Take 1 Tablet by mouth every evening. 90 Tablet 2    albuterol 108 (90 Base) MCG/ACT Aero Soln inhalation aerosol Inhale 2 Puffs every four hours as needed for Shortness of Breath. 9 g 4    vitamin D2, Ergocalciferol, (DRISDOL) 1.25 MG (78453 UT) Cap capsule Take 1 Capsule by mouth every 7 days. 12 Capsule 2    oxyCODONE immediate-release (ROXICODONE) 5 MG Tab Take 1 tablet every day by oral route as needed for 30 days. 10 Tablet 0    oxyCODONE immediate-release (ROXICODONE) 5 MG Tab Take 1 tablet every day by oral route as needed for 30 days. 10 Tablet 0    pregabalin (LYRICA) 25 MG Cap Take 1 Capsule by mouth 2 times a day as  needed (pain).      pregabalin (LYRICA) 150 MG Cap Take 150 mg by mouth at bedtime as needed (pain).      Semaglutide,0.25 or 0.5MG/DOS, (OZEMPIC, 0.25 OR 0.5 MG/DOSE,) 2 MG/3ML Solution Pen-injector Inject 0.5 mg under the skin every 7 days. 9 mL 2    metFORMIN ER (GLUCOPHAGE XR) 500 MG TABLET SR 24 HR Take 2 Tablets by mouth 2 times a day. 360 Tablet 2    ondansetron (ZOFRAN ODT) 4 MG TABLET DISPERSIBLE Take 1 Tablet by mouth every 6 hours as needed for Nausea/Vomiting. 10 Tablet 0    triamcinolone acetonide (KENALOG) 0.1 % Cream Apply 1 Application topically 2 times a day. (Patient not taking: Reported on 6/26/2025) 45 g 0     No current Epic-ordered facility-administered medications on file.

## 2025-06-29 LAB
BACTERIA UR CULT: NORMAL
SIGNIFICANT IND 70042: NORMAL
SITE SITE: NORMAL
SOURCE SOURCE: NORMAL

## 2025-07-14 PROCEDURE — RXMED WILLOW AMBULATORY MEDICATION CHARGE: Performed by: ANESTHESIOLOGY

## 2025-07-20 PROCEDURE — RXMED WILLOW AMBULATORY MEDICATION CHARGE: Performed by: ANESTHESIOLOGY

## 2025-07-22 PROCEDURE — RXMED WILLOW AMBULATORY MEDICATION CHARGE: Performed by: ANESTHESIOLOGY

## 2025-07-24 ENCOUNTER — PHARMACY VISIT (OUTPATIENT)
Dept: PHARMACY | Facility: MEDICAL CENTER | Age: 50
End: 2025-07-24
Payer: MEDICARE

## 2025-08-12 DIAGNOSIS — E11.9 CONTROLLED TYPE 2 DIABETES MELLITUS WITHOUT COMPLICATION, WITHOUT LONG-TERM CURRENT USE OF INSULIN (HCC): ICD-10-CM

## 2025-08-12 RX ORDER — SEMAGLUTIDE 0.68 MG/ML
INJECTION, SOLUTION SUBCUTANEOUS
Qty: 9 ML | Refills: 2 | Status: SHIPPED | OUTPATIENT
Start: 2025-08-12

## 2025-08-14 ENCOUNTER — APPOINTMENT (OUTPATIENT)
Dept: MEDICAL GROUP | Age: 50
End: 2025-08-14
Payer: COMMERCIAL

## 2025-08-19 ENCOUNTER — APPOINTMENT (OUTPATIENT)
Dept: ENDOCRINOLOGY | Facility: MEDICAL CENTER | Age: 50
End: 2025-08-19
Attending: INTERNAL MEDICINE
Payer: COMMERCIAL

## 2025-08-22 PROCEDURE — RXMED WILLOW AMBULATORY MEDICATION CHARGE: Performed by: ANESTHESIOLOGY

## 2025-08-25 ENCOUNTER — PHARMACY VISIT (OUTPATIENT)
Dept: PHARMACY | Facility: MEDICAL CENTER | Age: 50
End: 2025-08-25
Payer: COMMERCIAL

## 2025-08-25 PROCEDURE — RXOTC WILLOW AMBULATORY OTC CHARGE

## (undated) DEVICE — MASK PANORAMIC OXYGEN PRO2 (30EA/CA)

## (undated) DEVICE — SODIUM CHL IRRIGATION 0.9% 1000ML (12EA/CA)

## (undated) DEVICE — BUTTON ENDOSCOPY DISPOSABLE

## (undated) DEVICE — CANISTER SUCTION RIGID RED 1500CC (40EA/CA)

## (undated) DEVICE — TOWEL STOP TIMEOUT SAFETY FLAG (40EA/CA)

## (undated) DEVICE — ELECTRODE 850 FOAM ADHESIVE - HYDROGEL RADIOTRNSPRNT (50/PK)

## (undated) DEVICE — TUBE CONNECTING SUCTION - CLEAR PLASTIC STERILE 72 IN (50EA/CA)

## (undated) DEVICE — WATER IRRIGATION STERILE 1000ML (12EA/CA)

## (undated) DEVICE — FILM CASSETTE ENDO

## (undated) DEVICE — BLOCK BITE MAXI DENTAL RETENTION RIM (100EA/BX)

## (undated) DEVICE — KIT CUSTOM PROCEDURE SINGLE FOR ENDO (15/CA)

## (undated) DEVICE — NEPTUNE 4 PORT MANIFOLD - (20/PK)

## (undated) DEVICE — PORT AUXILLARY WATER (50EA/BX)

## (undated) DEVICE — TUBING CLEARLINK DUO-VENT - C-FLO (48EA/CA)

## (undated) DEVICE — FORCEP RADIAL JAW 4 STANDARD CAPACITY W/NEEDLE 240CM (40EA/BX)

## (undated) DEVICE — SENSOR OXIMETER ADULT SPO2 RD SET (20EA/BX)

## (undated) DEVICE — CONTAINER, SPECIMEN, STERILE

## (undated) DEVICE — LACTATED RINGERS INJ 1000 ML - (14EA/CA 60CA/PF)